# Patient Record
Sex: MALE | Race: WHITE | NOT HISPANIC OR LATINO | Employment: OTHER | URBAN - METROPOLITAN AREA
[De-identification: names, ages, dates, MRNs, and addresses within clinical notes are randomized per-mention and may not be internally consistent; named-entity substitution may affect disease eponyms.]

---

## 2017-02-02 ENCOUNTER — ALLSCRIPTS OFFICE VISIT (OUTPATIENT)
Dept: OTHER | Facility: OTHER | Age: 77
End: 2017-02-02

## 2018-01-11 NOTE — RESULT NOTES
Verified Results  (1) TISSUE EXAM [id number] 03:52PM Latricia Mendez     Test Name Result Flag Reference   LAB AP CASE REPORT (Report)     Surgical Pathology Report             Case: [id number]                   Authorizing Provider: Deepti Farah MD      Collected:      05/23/2016 1552        Ordering Location:   [facility]    Received:      05/23/2016 7 [facility]                              Pathologist:      Phoebe Frias MD                             Specimen:  Stomach, gastric bx r/o h pylori   LAB AP FINAL DIAGNOSIS (Report)     A  Gastric biopsy:    - Chronic inactive gastritis and mild reactive epithelial changes       - Negative for Helicobacter pylori organisms (immunohistochemical stain   with appropriate positive control)  - Negative for intestinal metaplasia, dysplasia and malignancy  LAB AP NOTE      Interpretation performed at [facility] [address]  [facility] [address],   [address] [facility]   LAB AP SURGICAL ADDITIONAL INFORMATION (Report)     These tests were developed and their performance characteristics   determined by [facility] or [address] [place] [address]  They may not be cleared or approved by the [place]  Food and   Drug Administration  The FDA has determined that such clearance or   approval is not necessary  These tests are used for clinical purposes  They should not be regarded as investigational or for research  This   laboratory has been approved by CLIA 88, designated as a high-complexity   laboratory and is qualified to perform these tests  LAB AP GROSS DESCRIPTION (Report)     A  The specimen is received in formalin, labeled with the patient's name   and hospital number, and is designated Gastric biopsy, rule out H    Pylori  The specimen consists of multiple tan to white to clear soft   tissue fragments measuring in aggregate 0 6 x 0 5 x 0 2 cm in greatest   dimension  Entirely submitted  One cassette      Note: The estimated total formalin fixation time based upon information   provided by the submitting clinician and the standard processing schedule   is 22 25 hours        ACL

## 2018-01-12 NOTE — MISCELLANEOUS
Message  Return to work or school: This patient was admitted to the [facility] 2/2-2/11/16  He required surgery 2/10/16  He was discharged 2/11/16  Daughter was present during hospitalization          Signatures   Electronically signed by : Mary Alfaro; Feb 17 2016  9:26AM EST                       (Author)

## 2018-01-14 VITALS
TEMPERATURE: 97.9 F | WEIGHT: 223 LBS | DIASTOLIC BLOOD PRESSURE: 64 MMHG | BODY MASS INDEX: 32 KG/M2 | OXYGEN SATURATION: 99 % | HEART RATE: 61 BPM | SYSTOLIC BLOOD PRESSURE: 138 MMHG

## 2019-01-01 ENCOUNTER — OFFICE VISIT (OUTPATIENT)
Dept: CARDIOLOGY CLINIC | Facility: CLINIC | Age: 79
End: 2019-01-01
Payer: COMMERCIAL

## 2019-01-01 ENCOUNTER — ANESTHESIA EVENT (INPATIENT)
Dept: PERIOP | Facility: HOSPITAL | Age: 79
DRG: 445 | End: 2019-01-01
Payer: COMMERCIAL

## 2019-01-01 ENCOUNTER — APPOINTMENT (EMERGENCY)
Dept: CT IMAGING | Facility: HOSPITAL | Age: 79
DRG: 445 | End: 2019-01-01
Payer: COMMERCIAL

## 2019-01-01 ENCOUNTER — TELEPHONE (OUTPATIENT)
Dept: GASTROENTEROLOGY | Facility: AMBULARY SURGERY CENTER | Age: 79
End: 2019-01-01

## 2019-01-01 ENCOUNTER — APPOINTMENT (INPATIENT)
Dept: PERIOP | Facility: HOSPITAL | Age: 79
DRG: 445 | End: 2019-01-01
Payer: COMMERCIAL

## 2019-01-01 ENCOUNTER — TRANSCRIBE ORDERS (OUTPATIENT)
Dept: GASTROENTEROLOGY | Facility: CLINIC | Age: 79
End: 2019-01-01

## 2019-01-01 ENCOUNTER — APPOINTMENT (INPATIENT)
Dept: RADIOLOGY | Facility: HOSPITAL | Age: 79
DRG: 445 | End: 2019-01-01
Payer: COMMERCIAL

## 2019-01-01 ENCOUNTER — HOSPITAL ENCOUNTER (INPATIENT)
Facility: HOSPITAL | Age: 79
LOS: 3 days | Discharge: HOME | DRG: 445 | End: 2019-12-08
Attending: EMERGENCY MEDICINE | Admitting: INTERNAL MEDICINE
Payer: COMMERCIAL

## 2019-01-01 ENCOUNTER — ANESTHESIA (INPATIENT)
Dept: PERIOP | Facility: HOSPITAL | Age: 79
DRG: 445 | End: 2019-01-01
Payer: COMMERCIAL

## 2019-01-01 ENCOUNTER — APPOINTMENT (INPATIENT)
Dept: NON INVASIVE DIAGNOSTICS | Facility: HOSPITAL | Age: 79
DRG: 445 | End: 2019-01-01
Payer: COMMERCIAL

## 2019-01-01 ENCOUNTER — TELEPHONE (OUTPATIENT)
Dept: CARDIOLOGY CLINIC | Facility: CLINIC | Age: 79
End: 2019-01-01

## 2019-01-01 VITALS
TEMPERATURE: 98.3 F | RESPIRATION RATE: 18 BRPM | DIASTOLIC BLOOD PRESSURE: 71 MMHG | HEART RATE: 90 BPM | OXYGEN SATURATION: 93 % | SYSTOLIC BLOOD PRESSURE: 141 MMHG | WEIGHT: 258 LBS | HEIGHT: 71 IN | BODY MASS INDEX: 36.12 KG/M2

## 2019-01-01 VITALS
OXYGEN SATURATION: 97 % | WEIGHT: 244.9 LBS | SYSTOLIC BLOOD PRESSURE: 138 MMHG | HEIGHT: 71 IN | HEART RATE: 72 BPM | BODY MASS INDEX: 34.28 KG/M2 | DIASTOLIC BLOOD PRESSURE: 62 MMHG

## 2019-01-01 DIAGNOSIS — K81.0 ACUTE CHOLECYSTITIS: ICD-10-CM

## 2019-01-01 DIAGNOSIS — E78.2 MIXED HYPERLIPIDEMIA: ICD-10-CM

## 2019-01-01 DIAGNOSIS — I31.3 PERICARDIAL EFFUSION: ICD-10-CM

## 2019-01-01 DIAGNOSIS — I10 ESSENTIAL HYPERTENSION: ICD-10-CM

## 2019-01-01 DIAGNOSIS — Z01.810 PREOPERATIVE CARDIOVASCULAR EXAMINATION: Primary | ICD-10-CM

## 2019-01-01 DIAGNOSIS — R74.01 TRANSAMINITIS: ICD-10-CM

## 2019-01-01 DIAGNOSIS — Z86.73 HISTORY OF CVA (CEREBROVASCULAR ACCIDENT): ICD-10-CM

## 2019-01-01 DIAGNOSIS — I48.0 PAF (PAROXYSMAL ATRIAL FIBRILLATION) (HCC): ICD-10-CM

## 2019-01-01 DIAGNOSIS — K80.42 CHOLEDOCHOLITHIASIS WITH ACUTE CHOLECYSTITIS: Primary | ICD-10-CM

## 2019-01-01 DIAGNOSIS — G45.1 TIA INVOLVING CAROTID ARTERY: ICD-10-CM

## 2019-01-01 LAB
ALBUMIN SERPL BCP-MCNC: 2.5 G/DL (ref 3.5–5)
ALBUMIN SERPL BCP-MCNC: 2.7 G/DL (ref 3.5–5)
ALBUMIN SERPL BCP-MCNC: 3.1 G/DL (ref 3.5–5)
ALBUMIN SERPL BCP-MCNC: 3.8 G/DL (ref 3.5–5)
ALP SERPL-CCNC: 204 U/L (ref 46–116)
ALP SERPL-CCNC: 208 U/L (ref 46–116)
ALP SERPL-CCNC: 223 U/L (ref 46–116)
ALP SERPL-CCNC: 252 U/L (ref 46–116)
ALT SERPL W P-5'-P-CCNC: 171 U/L (ref 12–78)
ALT SERPL W P-5'-P-CCNC: 218 U/L (ref 12–78)
ALT SERPL W P-5'-P-CCNC: 50 U/L (ref 12–78)
ALT SERPL W P-5'-P-CCNC: 92 U/L (ref 12–78)
ANION GAP SERPL CALCULATED.3IONS-SCNC: 10 MMOL/L (ref 4–13)
ANION GAP SERPL CALCULATED.3IONS-SCNC: 11 MMOL/L (ref 4–13)
ANION GAP SERPL CALCULATED.3IONS-SCNC: 8 MMOL/L (ref 4–13)
ANION GAP SERPL CALCULATED.3IONS-SCNC: 9 MMOL/L (ref 4–13)
APTT PPP: 31 SECONDS (ref 23–37)
AST SERPL W P-5'-P-CCNC: 108 U/L (ref 5–45)
AST SERPL W P-5'-P-CCNC: 54 U/L (ref 5–45)
AST SERPL W P-5'-P-CCNC: 66 U/L (ref 5–45)
AST SERPL W P-5'-P-CCNC: 69 U/L (ref 5–45)
ATRIAL RATE: 92 BPM
BACTERIA UR QL AUTO: ABNORMAL /HPF
BASOPHILS # BLD AUTO: 0.02 THOUSANDS/ΜL (ref 0–0.1)
BASOPHILS NFR BLD AUTO: 0 % (ref 0–1)
BILIRUB SERPL-MCNC: 2.21 MG/DL (ref 0.2–1)
BILIRUB SERPL-MCNC: 2.79 MG/DL (ref 0.2–1)
BILIRUB SERPL-MCNC: 3.7 MG/DL (ref 0.2–1)
BILIRUB SERPL-MCNC: 6.23 MG/DL (ref 0.2–1)
BILIRUB UR QL STRIP: NEGATIVE
BUN SERPL-MCNC: 18 MG/DL (ref 5–25)
BUN SERPL-MCNC: 21 MG/DL (ref 5–25)
BUN SERPL-MCNC: 24 MG/DL (ref 5–25)
BUN SERPL-MCNC: 28 MG/DL (ref 5–25)
CALCIUM SERPL-MCNC: 8 MG/DL (ref 8.3–10.1)
CALCIUM SERPL-MCNC: 8 MG/DL (ref 8.3–10.1)
CALCIUM SERPL-MCNC: 8.4 MG/DL (ref 8.3–10.1)
CALCIUM SERPL-MCNC: 9.2 MG/DL (ref 8.3–10.1)
CHLORIDE SERPL-SCNC: 102 MMOL/L (ref 100–108)
CHLORIDE SERPL-SCNC: 104 MMOL/L (ref 100–108)
CHLORIDE SERPL-SCNC: 104 MMOL/L (ref 100–108)
CHLORIDE SERPL-SCNC: 97 MMOL/L (ref 100–108)
CLARITY UR: CLEAR
CO2 SERPL-SCNC: 25 MMOL/L (ref 21–32)
CO2 SERPL-SCNC: 25 MMOL/L (ref 21–32)
CO2 SERPL-SCNC: 26 MMOL/L (ref 21–32)
CO2 SERPL-SCNC: 26 MMOL/L (ref 21–32)
COLOR UR: YELLOW
CREAT SERPL-MCNC: 0.9 MG/DL (ref 0.6–1.3)
CREAT SERPL-MCNC: 0.92 MG/DL (ref 0.6–1.3)
CREAT SERPL-MCNC: 1.02 MG/DL (ref 0.6–1.3)
CREAT SERPL-MCNC: 1.13 MG/DL (ref 0.6–1.3)
EOSINOPHIL # BLD AUTO: 0 THOUSAND/ΜL (ref 0–0.61)
EOSINOPHIL NFR BLD AUTO: 0 % (ref 0–6)
ERYTHROCYTE [DISTWIDTH] IN BLOOD BY AUTOMATED COUNT: 13.2 % (ref 11.6–15.1)
ERYTHROCYTE [DISTWIDTH] IN BLOOD BY AUTOMATED COUNT: 13.3 % (ref 11.6–15.1)
ERYTHROCYTE [DISTWIDTH] IN BLOOD BY AUTOMATED COUNT: 13.3 % (ref 11.6–15.1)
GFR SERPL CREATININE-BSD FRML MDRD: 61 ML/MIN/1.73SQ M
GFR SERPL CREATININE-BSD FRML MDRD: 69 ML/MIN/1.73SQ M
GFR SERPL CREATININE-BSD FRML MDRD: 78 ML/MIN/1.73SQ M
GFR SERPL CREATININE-BSD FRML MDRD: 80 ML/MIN/1.73SQ M
GLUCOSE SERPL-MCNC: 117 MG/DL (ref 65–140)
GLUCOSE SERPL-MCNC: 176 MG/DL (ref 65–140)
GLUCOSE SERPL-MCNC: 80 MG/DL (ref 65–140)
GLUCOSE SERPL-MCNC: 89 MG/DL (ref 65–140)
GLUCOSE UR STRIP-MCNC: NEGATIVE MG/DL
HCT VFR BLD AUTO: 37.9 % (ref 36.5–49.3)
HCT VFR BLD AUTO: 40.2 % (ref 36.5–49.3)
HCT VFR BLD AUTO: 43 % (ref 36.5–49.3)
HGB BLD-MCNC: 12.5 G/DL (ref 12–17)
HGB BLD-MCNC: 13.2 G/DL (ref 12–17)
HGB BLD-MCNC: 14.3 G/DL (ref 12–17)
HGB UR QL STRIP.AUTO: ABNORMAL
IMM GRANULOCYTES # BLD AUTO: 0.04 THOUSAND/UL (ref 0–0.2)
IMM GRANULOCYTES NFR BLD AUTO: 0 % (ref 0–2)
INR PPP: 1.06 (ref 0.84–1.19)
KETONES UR STRIP-MCNC: ABNORMAL MG/DL
LACTATE SERPL-SCNC: 1.8 MMOL/L (ref 0.5–2)
LEUKOCYTE ESTERASE UR QL STRIP: NEGATIVE
LIPASE SERPL-CCNC: 236 U/L (ref 73–393)
LYMPHOCYTES # BLD AUTO: 0.46 THOUSANDS/ΜL (ref 0.6–4.47)
LYMPHOCYTES NFR BLD AUTO: 4 % (ref 14–44)
MAGNESIUM SERPL-MCNC: 1.6 MG/DL (ref 1.6–2.6)
MCH RBC QN AUTO: 30.2 PG (ref 26.8–34.3)
MCH RBC QN AUTO: 30.4 PG (ref 26.8–34.3)
MCH RBC QN AUTO: 30.7 PG (ref 26.8–34.3)
MCHC RBC AUTO-ENTMCNC: 32.8 G/DL (ref 31.4–37.4)
MCHC RBC AUTO-ENTMCNC: 33 G/DL (ref 31.4–37.4)
MCHC RBC AUTO-ENTMCNC: 33.3 G/DL (ref 31.4–37.4)
MCV RBC AUTO: 91 FL (ref 82–98)
MCV RBC AUTO: 93 FL (ref 82–98)
MCV RBC AUTO: 93 FL (ref 82–98)
MONOCYTES # BLD AUTO: 0.59 THOUSAND/ΜL (ref 0.17–1.22)
MONOCYTES NFR BLD AUTO: 5 % (ref 4–12)
NEUTROPHILS # BLD AUTO: 10.07 THOUSANDS/ΜL (ref 1.85–7.62)
NEUTS SEG NFR BLD AUTO: 91 % (ref 43–75)
NITRITE UR QL STRIP: NEGATIVE
NON-SQ EPI CELLS URNS QL MICRO: ABNORMAL /HPF
NRBC BLD AUTO-RTO: 0 /100 WBCS
P AXIS: 84 DEGREES
PH UR STRIP.AUTO: 6 [PH] (ref 4.5–8)
PLATELET # BLD AUTO: 113 THOUSANDS/UL (ref 149–390)
PLATELET # BLD AUTO: 95 THOUSANDS/UL (ref 149–390)
PLATELET # BLD AUTO: 98 THOUSANDS/UL (ref 149–390)
PMV BLD AUTO: 10.2 FL (ref 8.9–12.7)
PMV BLD AUTO: 10.3 FL (ref 8.9–12.7)
PMV BLD AUTO: 10.4 FL (ref 8.9–12.7)
POTASSIUM SERPL-SCNC: 3.2 MMOL/L (ref 3.5–5.3)
POTASSIUM SERPL-SCNC: 3.6 MMOL/L (ref 3.5–5.3)
POTASSIUM SERPL-SCNC: 3.9 MMOL/L (ref 3.5–5.3)
POTASSIUM SERPL-SCNC: 4.2 MMOL/L (ref 3.5–5.3)
PR INTERVAL: 220 MS
PROT SERPL-MCNC: 6 G/DL (ref 6.4–8.2)
PROT SERPL-MCNC: 6 G/DL (ref 6.4–8.2)
PROT SERPL-MCNC: 6.6 G/DL (ref 6.4–8.2)
PROT SERPL-MCNC: 7.7 G/DL (ref 6.4–8.2)
PROT UR STRIP-MCNC: ABNORMAL MG/DL
PROTHROMBIN TIME: 13.2 SECONDS (ref 11.6–14.5)
QRS AXIS: 41 DEGREES
QRSD INTERVAL: 114 MS
QT INTERVAL: 412 MS
QTC INTERVAL: 504 MS
RBC # BLD AUTO: 4.07 MILLION/UL (ref 3.88–5.62)
RBC # BLD AUTO: 4.34 MILLION/UL (ref 3.88–5.62)
RBC # BLD AUTO: 4.73 MILLION/UL (ref 3.88–5.62)
RBC #/AREA URNS AUTO: ABNORMAL /HPF
SODIUM SERPL-SCNC: 133 MMOL/L (ref 136–145)
SODIUM SERPL-SCNC: 136 MMOL/L (ref 136–145)
SODIUM SERPL-SCNC: 138 MMOL/L (ref 136–145)
SODIUM SERPL-SCNC: 140 MMOL/L (ref 136–145)
SP GR UR STRIP.AUTO: 1.01 (ref 1–1.03)
T WAVE AXIS: 29 DEGREES
TROPONIN I SERPL-MCNC: 0.05 NG/ML
UROBILINOGEN UR QL STRIP.AUTO: 1 E.U./DL
VENTRICULAR RATE: 90 BPM
WBC # BLD AUTO: 11.05 THOUSAND/UL (ref 4.31–10.16)
WBC # BLD AUTO: 11.18 THOUSAND/UL (ref 4.31–10.16)
WBC # BLD AUTO: 8.87 THOUSAND/UL (ref 4.31–10.16)
WBC #/AREA URNS AUTO: ABNORMAL /HPF

## 2019-01-01 PROCEDURE — 99232 SBSQ HOSP IP/OBS MODERATE 35: CPT | Performed by: INTERNAL MEDICINE

## 2019-01-01 PROCEDURE — 80053 COMPREHEN METABOLIC PANEL: CPT | Performed by: PHYSICIAN ASSISTANT

## 2019-01-01 PROCEDURE — 93005 ELECTROCARDIOGRAM TRACING: CPT

## 2019-01-01 PROCEDURE — 96375 TX/PRO/DX INJ NEW DRUG ADDON: CPT

## 2019-01-01 PROCEDURE — C1769 GUIDE WIRE: HCPCS

## 2019-01-01 PROCEDURE — C2617 STENT, NON-COR, TEM W/O DEL: HCPCS

## 2019-01-01 PROCEDURE — 0F798DZ DILATION OF COMMON BILE DUCT WITH INTRALUMINAL DEVICE, VIA NATURAL OR ARTIFICIAL OPENING ENDOSCOPIC: ICD-10-PCS | Performed by: INTERNAL MEDICINE

## 2019-01-01 PROCEDURE — 85027 COMPLETE CBC AUTOMATED: CPT | Performed by: PHYSICIAN ASSISTANT

## 2019-01-01 PROCEDURE — 80053 COMPREHEN METABOLIC PANEL: CPT | Performed by: SURGERY

## 2019-01-01 PROCEDURE — 99239 HOSP IP/OBS DSCHRG MGMT >30: CPT | Performed by: PHYSICIAN ASSISTANT

## 2019-01-01 PROCEDURE — 93325 DOPPLER ECHO COLOR FLOW MAPG: CPT | Performed by: INTERNAL MEDICINE

## 2019-01-01 PROCEDURE — 36415 COLL VENOUS BLD VENIPUNCTURE: CPT | Performed by: PHYSICIAN ASSISTANT

## 2019-01-01 PROCEDURE — 85025 COMPLETE CBC W/AUTO DIFF WBC: CPT | Performed by: PHYSICIAN ASSISTANT

## 2019-01-01 PROCEDURE — 81001 URINALYSIS AUTO W/SCOPE: CPT

## 2019-01-01 PROCEDURE — 93010 ELECTROCARDIOGRAM REPORT: CPT | Performed by: INTERNAL MEDICINE

## 2019-01-01 PROCEDURE — 99223 1ST HOSP IP/OBS HIGH 75: CPT | Performed by: INTERNAL MEDICINE

## 2019-01-01 PROCEDURE — 83690 ASSAY OF LIPASE: CPT | Performed by: PHYSICIAN ASSISTANT

## 2019-01-01 PROCEDURE — 99285 EMERGENCY DEPT VISIT HI MDM: CPT | Performed by: PHYSICIAN ASSISTANT

## 2019-01-01 PROCEDURE — 96361 HYDRATE IV INFUSION ADD-ON: CPT

## 2019-01-01 PROCEDURE — 99231 SBSQ HOSP IP/OBS SF/LOW 25: CPT | Performed by: SURGERY

## 2019-01-01 PROCEDURE — 85730 THROMBOPLASTIN TIME PARTIAL: CPT | Performed by: PHYSICIAN ASSISTANT

## 2019-01-01 PROCEDURE — 99232 SBSQ HOSP IP/OBS MODERATE 35: CPT | Performed by: PHYSICIAN ASSISTANT

## 2019-01-01 PROCEDURE — 93321 DOPPLER ECHO F-UP/LMTD STD: CPT | Performed by: INTERNAL MEDICINE

## 2019-01-01 PROCEDURE — 99214 OFFICE O/P EST MOD 30 MIN: CPT | Performed by: NURSE PRACTITIONER

## 2019-01-01 PROCEDURE — BF101ZZ FLUOROSCOPY OF BILE DUCTS USING LOW OSMOLAR CONTRAST: ICD-10-PCS | Performed by: RADIOLOGY

## 2019-01-01 PROCEDURE — 99285 EMERGENCY DEPT VISIT HI MDM: CPT

## 2019-01-01 PROCEDURE — 74177 CT ABD & PELVIS W/CONTRAST: CPT

## 2019-01-01 PROCEDURE — 3075F SYST BP GE 130 - 139MM HG: CPT | Performed by: NURSE PRACTITIONER

## 2019-01-01 PROCEDURE — 83735 ASSAY OF MAGNESIUM: CPT | Performed by: PHYSICIAN ASSISTANT

## 2019-01-01 PROCEDURE — 96365 THER/PROPH/DIAG IV INF INIT: CPT

## 2019-01-01 PROCEDURE — 83605 ASSAY OF LACTIC ACID: CPT | Performed by: PHYSICIAN ASSISTANT

## 2019-01-01 PROCEDURE — 93000 ELECTROCARDIOGRAM COMPLETE: CPT | Performed by: NURSE PRACTITIONER

## 2019-01-01 PROCEDURE — 93308 TTE F-UP OR LMTD: CPT

## 2019-01-01 PROCEDURE — 43273 ENDOSCOPIC PANCREATOSCOPY: CPT | Performed by: INTERNAL MEDICINE

## 2019-01-01 PROCEDURE — 93308 TTE F-UP OR LMTD: CPT | Performed by: INTERNAL MEDICINE

## 2019-01-01 PROCEDURE — 99221 1ST HOSP IP/OBS SF/LOW 40: CPT | Performed by: SURGERY

## 2019-01-01 PROCEDURE — 85610 PROTHROMBIN TIME: CPT | Performed by: PHYSICIAN ASSISTANT

## 2019-01-01 PROCEDURE — 74328 X-RAY BILE DUCT ENDOSCOPY: CPT

## 2019-01-01 PROCEDURE — 43274 ERCP DUCT STENT PLACEMENT: CPT | Performed by: INTERNAL MEDICINE

## 2019-01-01 PROCEDURE — 3078F DIAST BP <80 MM HG: CPT | Performed by: NURSE PRACTITIONER

## 2019-01-01 PROCEDURE — 84484 ASSAY OF TROPONIN QUANT: CPT | Performed by: PHYSICIAN ASSISTANT

## 2019-01-01 RX ORDER — CEPHALEXIN 500 MG/1
500 CAPSULE ORAL EVERY 6 HOURS SCHEDULED
Qty: 16 CAPSULE | Refills: 0 | Status: SHIPPED | OUTPATIENT
Start: 2019-01-01 | End: 2019-01-01

## 2019-01-01 RX ORDER — PROPOFOL 10 MG/ML
INJECTION, EMULSION INTRAVENOUS AS NEEDED
Status: DISCONTINUED | OUTPATIENT
Start: 2019-01-01 | End: 2019-01-01 | Stop reason: SURG

## 2019-01-01 RX ORDER — ATORVASTATIN CALCIUM 40 MG/1
40 TABLET, FILM COATED ORAL
Status: DISCONTINUED | OUTPATIENT
Start: 2019-01-01 | End: 2019-01-01 | Stop reason: HOSPADM

## 2019-01-01 RX ORDER — CEFAZOLIN SODIUM 2 G/50ML
2000 SOLUTION INTRAVENOUS ONCE
Status: COMPLETED | OUTPATIENT
Start: 2019-01-01 | End: 2019-01-01

## 2019-01-01 RX ORDER — THIAMINE MONONITRATE (VIT B1) 100 MG
100 TABLET ORAL 2 TIMES DAILY
Status: DISCONTINUED | OUTPATIENT
Start: 2019-01-01 | End: 2019-01-01 | Stop reason: HOSPADM

## 2019-01-01 RX ORDER — TRAMADOL HYDROCHLORIDE 50 MG/1
50 TABLET ORAL EVERY 6 HOURS PRN
Qty: 20 TABLET | Refills: 0 | Status: SHIPPED | OUTPATIENT
Start: 2019-01-01 | End: 2019-01-01

## 2019-01-01 RX ORDER — LIDOCAINE HYDROCHLORIDE 10 MG/ML
INJECTION, SOLUTION INFILTRATION; PERINEURAL
Status: COMPLETED | OUTPATIENT
Start: 2019-01-01 | End: 2019-01-01

## 2019-01-01 RX ORDER — MORPHINE SULFATE 4 MG/ML
4 INJECTION, SOLUTION INTRAMUSCULAR; INTRAVENOUS ONCE
Status: COMPLETED | OUTPATIENT
Start: 2019-01-01 | End: 2019-01-01

## 2019-01-01 RX ORDER — POLYETHYLENE GLYCOL 3350 17 G/17G
17 POWDER, FOR SOLUTION ORAL DAILY
Status: DISCONTINUED | OUTPATIENT
Start: 2019-01-01 | End: 2019-01-01 | Stop reason: HOSPADM

## 2019-01-01 RX ORDER — FERROUS SULFATE 325(65) MG
325 TABLET ORAL
Status: DISCONTINUED | OUTPATIENT
Start: 2019-01-01 | End: 2019-01-01 | Stop reason: HOSPADM

## 2019-01-01 RX ORDER — SODIUM CHLORIDE 9 MG/ML
100 INJECTION, SOLUTION INTRAVENOUS CONTINUOUS
Status: DISCONTINUED | OUTPATIENT
Start: 2019-01-01 | End: 2019-01-01

## 2019-01-01 RX ORDER — ASPIRIN 81 MG/1
81 TABLET, CHEWABLE ORAL DAILY
Status: DISCONTINUED | OUTPATIENT
Start: 2019-01-01 | End: 2019-01-01 | Stop reason: HOSPADM

## 2019-01-01 RX ORDER — METRONIDAZOLE 500 MG/1
500 TABLET ORAL EVERY 8 HOURS SCHEDULED
Qty: 12 TABLET | Refills: 0 | Status: SHIPPED | OUTPATIENT
Start: 2019-01-01 | End: 2019-01-01

## 2019-01-01 RX ORDER — ONDANSETRON 2 MG/ML
4 INJECTION INTRAMUSCULAR; INTRAVENOUS EVERY 4 HOURS PRN
Status: DISCONTINUED | OUTPATIENT
Start: 2019-01-01 | End: 2019-01-01 | Stop reason: HOSPADM

## 2019-01-01 RX ORDER — SODIUM CHLORIDE 9 MG/ML
75 INJECTION, SOLUTION INTRAVENOUS CONTINUOUS
Status: DISCONTINUED | OUTPATIENT
Start: 2019-01-01 | End: 2019-01-01 | Stop reason: HOSPADM

## 2019-01-01 RX ORDER — DOCUSATE SODIUM 100 MG/1
100 CAPSULE, LIQUID FILLED ORAL 2 TIMES DAILY
Status: DISCONTINUED | OUTPATIENT
Start: 2019-01-01 | End: 2019-01-01 | Stop reason: HOSPADM

## 2019-01-01 RX ORDER — ONDANSETRON 2 MG/ML
4 INJECTION INTRAMUSCULAR; INTRAVENOUS ONCE
Status: COMPLETED | OUTPATIENT
Start: 2019-01-01 | End: 2019-01-01

## 2019-01-01 RX ORDER — CHLORAL HYDRATE 500 MG
1000 CAPSULE ORAL DAILY
Status: DISCONTINUED | OUTPATIENT
Start: 2019-01-01 | End: 2019-01-01 | Stop reason: HOSPADM

## 2019-01-01 RX ORDER — LIDOCAINE HYDROCHLORIDE 10 MG/ML
INJECTION, SOLUTION INFILTRATION; PERINEURAL AS NEEDED
Status: DISCONTINUED | OUTPATIENT
Start: 2019-01-01 | End: 2019-01-01 | Stop reason: SURG

## 2019-01-01 RX ORDER — LANOLIN ALCOHOL/MO/W.PET/CERES
400 CREAM (GRAM) TOPICAL DAILY
Status: DISCONTINUED | OUTPATIENT
Start: 2019-01-01 | End: 2019-01-01 | Stop reason: HOSPADM

## 2019-01-01 RX ORDER — FENTANYL CITRATE 50 UG/ML
INJECTION, SOLUTION INTRAMUSCULAR; INTRAVENOUS AS NEEDED
Status: DISCONTINUED | OUTPATIENT
Start: 2019-01-01 | End: 2019-01-01 | Stop reason: SURG

## 2019-01-01 RX ORDER — ONDANSETRON 2 MG/ML
INJECTION INTRAMUSCULAR; INTRAVENOUS AS NEEDED
Status: DISCONTINUED | OUTPATIENT
Start: 2019-01-01 | End: 2019-01-01 | Stop reason: SURG

## 2019-01-01 RX ORDER — HYDRALAZINE HYDROCHLORIDE 20 MG/ML
5 INJECTION INTRAMUSCULAR; INTRAVENOUS EVERY 6 HOURS PRN
Status: DISCONTINUED | OUTPATIENT
Start: 2019-01-01 | End: 2019-01-01 | Stop reason: HOSPADM

## 2019-01-01 RX ORDER — LISINOPRIL 5 MG/1
5 TABLET ORAL DAILY
Status: DISCONTINUED | OUTPATIENT
Start: 2019-01-01 | End: 2019-01-01 | Stop reason: HOSPADM

## 2019-01-01 RX ORDER — CEFAZOLIN SODIUM 2 G/50ML
2000 SOLUTION INTRAVENOUS EVERY 8 HOURS
Status: DISCONTINUED | OUTPATIENT
Start: 2019-01-01 | End: 2019-01-01 | Stop reason: HOSPADM

## 2019-01-01 RX ORDER — TRAMADOL HYDROCHLORIDE 50 MG/1
50 TABLET ORAL EVERY 6 HOURS PRN
Qty: 20 TABLET | Refills: 0 | Status: SHIPPED | OUTPATIENT
Start: 2019-01-01 | End: 2019-01-01 | Stop reason: SDUPTHER

## 2019-01-01 RX ORDER — CHOLECALCIFEROL (VITAMIN D3) 125 MCG
100 CAPSULE ORAL DAILY
Status: DISCONTINUED | OUTPATIENT
Start: 2019-01-01 | End: 2019-01-01 | Stop reason: HOSPADM

## 2019-01-01 RX ORDER — SUCCINYLCHOLINE/SOD CL,ISO/PF 100 MG/5ML
SYRINGE (ML) INTRAVENOUS AS NEEDED
Status: DISCONTINUED | OUTPATIENT
Start: 2019-01-01 | End: 2019-01-01 | Stop reason: SURG

## 2019-01-01 RX ORDER — ONDANSETRON 2 MG/ML
4 INJECTION INTRAMUSCULAR; INTRAVENOUS EVERY 6 HOURS PRN
Status: DISCONTINUED | OUTPATIENT
Start: 2019-01-01 | End: 2019-01-01 | Stop reason: HOSPADM

## 2019-01-01 RX ORDER — CALCIUM CARBONATE 200(500)MG
1000 TABLET,CHEWABLE ORAL DAILY PRN
Status: DISCONTINUED | OUTPATIENT
Start: 2019-01-01 | End: 2019-01-01 | Stop reason: HOSPADM

## 2019-01-01 RX ORDER — POTASSIUM CHLORIDE 20 MEQ/1
40 TABLET, EXTENDED RELEASE ORAL ONCE
Status: COMPLETED | OUTPATIENT
Start: 2019-01-01 | End: 2019-01-01

## 2019-01-01 RX ORDER — RIBOFLAVIN (VITAMIN B2) 100 MG
100 TABLET ORAL DAILY
Status: DISCONTINUED | OUTPATIENT
Start: 2019-01-01 | End: 2019-01-01 | Stop reason: RX

## 2019-01-01 RX ORDER — SENNOSIDES 8.6 MG
1 TABLET ORAL DAILY
Status: DISCONTINUED | OUTPATIENT
Start: 2019-01-01 | End: 2019-01-01 | Stop reason: HOSPADM

## 2019-01-01 RX ADMIN — Medication 1000 MG: at 08:38

## 2019-01-01 RX ADMIN — VITAM B12 50 MCG: 100 TAB at 08:38

## 2019-01-01 RX ADMIN — DOCUSATE SODIUM 100 MG: 100 CAPSULE, LIQUID FILLED ORAL at 08:38

## 2019-01-01 RX ADMIN — METRONIDAZOLE 500 MG: 500 INJECTION, SOLUTION INTRAVENOUS at 23:49

## 2019-01-01 RX ADMIN — Medication 100 MG: at 08:11

## 2019-01-01 RX ADMIN — LIDOCAINE HYDROCHLORIDE 0.5 ML: 10 INJECTION, SOLUTION INFILTRATION; PERINEURAL at 11:10

## 2019-01-01 RX ADMIN — MAGNESIUM GLUCONATE 500 MG ORAL TABLET 400 MG: 500 TABLET ORAL at 17:04

## 2019-01-01 RX ADMIN — CEFAZOLIN SODIUM 2000 MG: 2 SOLUTION INTRAVENOUS at 06:19

## 2019-01-01 RX ADMIN — ENOXAPARIN SODIUM 40 MG: 40 INJECTION SUBCUTANEOUS at 08:41

## 2019-01-01 RX ADMIN — IOHEXOL 100 ML: 350 INJECTION, SOLUTION INTRAVENOUS at 13:34

## 2019-01-01 RX ADMIN — SODIUM CHLORIDE 125 ML/HR: 0.9 INJECTION, SOLUTION INTRAVENOUS at 18:56

## 2019-01-01 RX ADMIN — LIDOCAINE HYDROCHLORIDE 50 MG: 10 INJECTION, SOLUTION INFILTRATION; PERINEURAL at 11:35

## 2019-01-01 RX ADMIN — FENTANYL CITRATE 50 MCG: 50 INJECTION INTRAMUSCULAR; INTRAVENOUS at 11:35

## 2019-01-01 RX ADMIN — Medication 400 MCG: at 08:40

## 2019-01-01 RX ADMIN — Medication 1 TABLET: at 08:38

## 2019-01-01 RX ADMIN — FERROUS SULFATE TAB 325 MG (65 MG ELEMENTAL FE) 325 MG: 325 (65 FE) TAB at 08:10

## 2019-01-01 RX ADMIN — ASPIRIN 81 MG 81 MG: 81 TABLET ORAL at 08:38

## 2019-01-01 RX ADMIN — IOHEXOL 16 ML: 240 INJECTION, SOLUTION INTRATHECAL; INTRAVASCULAR; INTRAVENOUS; ORAL at 12:07

## 2019-01-01 RX ADMIN — METRONIDAZOLE 500 MG: 500 INJECTION, SOLUTION INTRAVENOUS at 01:14

## 2019-01-01 RX ADMIN — METRONIDAZOLE 500 MG: 500 INJECTION, SOLUTION INTRAVENOUS at 23:12

## 2019-01-01 RX ADMIN — ASPIRIN 81 MG 81 MG: 81 TABLET ORAL at 14:35

## 2019-01-01 RX ADMIN — MORPHINE SULFATE 2 MG: 2 INJECTION, SOLUTION INTRAMUSCULAR; INTRAVENOUS at 06:43

## 2019-01-01 RX ADMIN — METRONIDAZOLE 500 MG: 500 INJECTION, SOLUTION INTRAVENOUS at 09:18

## 2019-01-01 RX ADMIN — SODIUM CHLORIDE 1000 ML: 0.9 INJECTION, SOLUTION INTRAVENOUS at 12:46

## 2019-01-01 RX ADMIN — Medication 1000 MG: at 14:34

## 2019-01-01 RX ADMIN — MORPHINE SULFATE 1 MG: 2 INJECTION, SOLUTION INTRAMUSCULAR; INTRAVENOUS at 20:10

## 2019-01-01 RX ADMIN — METRONIDAZOLE 500 MG: 500 INJECTION, SOLUTION INTRAVENOUS at 16:30

## 2019-01-01 RX ADMIN — FERROUS SULFATE TAB 325 MG (65 MG ELEMENTAL FE) 325 MG: 325 (65 FE) TAB at 14:34

## 2019-01-01 RX ADMIN — SODIUM CHLORIDE 100 ML/HR: 0.9 INJECTION, SOLUTION INTRAVENOUS at 17:02

## 2019-01-01 RX ADMIN — MAGNESIUM GLUCONATE 500 MG ORAL TABLET 400 MG: 500 TABLET ORAL at 08:38

## 2019-01-01 RX ADMIN — CEFAZOLIN SODIUM 2000 MG: 2 SOLUTION INTRAVENOUS at 06:42

## 2019-01-01 RX ADMIN — LISINOPRIL 5 MG: 5 TABLET ORAL at 08:11

## 2019-01-01 RX ADMIN — POTASSIUM CHLORIDE 40 MEQ: 1500 TABLET, EXTENDED RELEASE ORAL at 11:24

## 2019-01-01 RX ADMIN — FERROUS SULFATE TAB 325 MG (65 MG ELEMENTAL FE) 325 MG: 325 (65 FE) TAB at 08:38

## 2019-01-01 RX ADMIN — MAGNESIUM GLUCONATE 500 MG ORAL TABLET 400 MG: 500 TABLET ORAL at 17:11

## 2019-01-01 RX ADMIN — CEFAZOLIN SODIUM 2000 MG: 2 SOLUTION INTRAVENOUS at 22:21

## 2019-01-01 RX ADMIN — Medication 100 MG: at 14:34

## 2019-01-01 RX ADMIN — ONDANSETRON 4 MG: 2 INJECTION INTRAMUSCULAR; INTRAVENOUS at 11:42

## 2019-01-01 RX ADMIN — ATORVASTATIN CALCIUM 40 MG: 40 TABLET, FILM COATED ORAL at 15:39

## 2019-01-01 RX ADMIN — METRONIDAZOLE 500 MG: 500 INJECTION, SOLUTION INTRAVENOUS at 08:12

## 2019-01-01 RX ADMIN — MAGNESIUM GLUCONATE 500 MG ORAL TABLET 400 MG: 500 TABLET ORAL at 08:10

## 2019-01-01 RX ADMIN — CEFAZOLIN SODIUM 2000 MG: 2 SOLUTION INTRAVENOUS at 00:43

## 2019-01-01 RX ADMIN — FENTANYL CITRATE 50 MCG: 50 INJECTION INTRAMUSCULAR; INTRAVENOUS at 11:51

## 2019-01-01 RX ADMIN — Medication 1 TABLET: at 14:34

## 2019-01-01 RX ADMIN — ENOXAPARIN SODIUM 40 MG: 40 INJECTION SUBCUTANEOUS at 08:12

## 2019-01-01 RX ADMIN — CEFAZOLIN SODIUM 2000 MG: 2 SOLUTION INTRAVENOUS at 14:29

## 2019-01-01 RX ADMIN — MAGNESIUM GLUCONATE 500 MG ORAL TABLET 400 MG: 500 TABLET ORAL at 14:33

## 2019-01-01 RX ADMIN — SENNOSIDES 8.6 MG: 8.6 TABLET, FILM COATED ORAL at 14:34

## 2019-01-01 RX ADMIN — SODIUM CHLORIDE 75 ML/HR: 0.9 INJECTION, SOLUTION INTRAVENOUS at 16:25

## 2019-01-01 RX ADMIN — CEFAZOLIN SODIUM 2000 MG: 2 SOLUTION INTRAVENOUS at 16:26

## 2019-01-01 RX ADMIN — PROPOFOL 150 MG: 10 INJECTION, EMULSION INTRAVENOUS at 11:35

## 2019-01-01 RX ADMIN — METRONIDAZOLE 500 MG: 500 INJECTION, SOLUTION INTRAVENOUS at 17:04

## 2019-01-01 RX ADMIN — CEFAZOLIN SODIUM 2000 MG: 2 SOLUTION INTRAVENOUS at 14:47

## 2019-01-01 RX ADMIN — METRONIDAZOLE 500 MG: 500 INJECTION, SOLUTION INTRAVENOUS at 08:39

## 2019-01-01 RX ADMIN — MORPHINE SULFATE 2 MG: 2 INJECTION, SOLUTION INTRAMUSCULAR; INTRAVENOUS at 17:50

## 2019-01-01 RX ADMIN — Medication 100 MG: at 08:38

## 2019-01-01 RX ADMIN — Medication 1000 MG: at 08:10

## 2019-01-01 RX ADMIN — SODIUM CHLORIDE 75 ML/HR: 0.9 INJECTION, SOLUTION INTRAVENOUS at 06:51

## 2019-01-01 RX ADMIN — METRONIDAZOLE 500 MG: 500 INJECTION, SOLUTION INTRAVENOUS at 15:39

## 2019-01-01 RX ADMIN — SODIUM CHLORIDE 125 ML/HR: 0.9 INJECTION, SOLUTION INTRAVENOUS at 01:20

## 2019-01-01 RX ADMIN — MORPHINE SULFATE 2 MG: 2 INJECTION, SOLUTION INTRAMUSCULAR; INTRAVENOUS at 08:24

## 2019-01-01 RX ADMIN — CEFAZOLIN SODIUM 2000 MG: 2 SOLUTION INTRAVENOUS at 22:14

## 2019-01-01 RX ADMIN — ONDANSETRON 4 MG: 2 INJECTION INTRAMUSCULAR; INTRAVENOUS at 12:46

## 2019-01-01 RX ADMIN — SENNOSIDES 8.6 MG: 8.6 TABLET, FILM COATED ORAL at 08:41

## 2019-01-01 RX ADMIN — SODIUM CHLORIDE 125 ML/HR: 0.9 INJECTION, SOLUTION INTRAVENOUS at 09:13

## 2019-01-01 RX ADMIN — VITAM B12 50 MCG: 100 TAB at 14:33

## 2019-01-01 RX ADMIN — MORPHINE SULFATE 2 MG: 2 INJECTION, SOLUTION INTRAMUSCULAR; INTRAVENOUS at 01:15

## 2019-01-01 RX ADMIN — ASPIRIN 81 MG 81 MG: 81 TABLET ORAL at 08:10

## 2019-01-01 RX ADMIN — MORPHINE SULFATE 4 MG: 4 INJECTION INTRAVENOUS at 12:46

## 2019-01-01 RX ADMIN — Medication 1 TABLET: at 08:10

## 2019-01-01 RX ADMIN — SODIUM CHLORIDE 75 ML/HR: 0.9 INJECTION, SOLUTION INTRAVENOUS at 01:14

## 2019-01-01 RX ADMIN — Medication 100 MG: at 08:39

## 2019-01-01 RX ADMIN — VITAM B12 50 MCG: 100 TAB at 08:11

## 2019-01-01 RX ADMIN — Medication 100 MG: at 14:35

## 2019-01-01 RX ADMIN — Medication 100 MG: at 08:10

## 2019-01-01 RX ADMIN — POLYETHYLENE GLYCOL 3350 17 G: 17 POWDER, FOR SOLUTION ORAL at 11:23

## 2019-01-01 RX ADMIN — CEFAZOLIN SODIUM 2000 MG: 2 SOLUTION INTRAVENOUS at 06:13

## 2019-01-01 RX ADMIN — ATORVASTATIN CALCIUM 40 MG: 40 TABLET, FILM COATED ORAL at 17:03

## 2019-01-01 RX ADMIN — INDOMETHACIN 100 MG: 50 SUPPOSITORY RECTAL at 11:49

## 2019-01-01 RX ADMIN — Medication 100 MG: at 17:11

## 2019-01-01 RX ADMIN — Medication 400 MCG: at 08:12

## 2019-01-01 RX ADMIN — LISINOPRIL 5 MG: 5 TABLET ORAL at 08:38

## 2019-01-01 RX ADMIN — DOCUSATE SODIUM 100 MG: 100 CAPSULE, LIQUID FILLED ORAL at 17:56

## 2019-01-01 RX ADMIN — SENNOSIDES 8.6 MG: 8.6 TABLET, FILM COATED ORAL at 08:11

## 2019-01-01 RX ADMIN — Medication 100 MG: at 17:04

## 2019-01-01 RX ADMIN — Medication 100 MG: at 11:35

## 2019-06-25 ENCOUNTER — HOSPITAL ENCOUNTER (OUTPATIENT)
Dept: RADIOLOGY | Facility: IMAGING CENTER | Age: 79
Discharge: HOME | End: 2019-06-25
Payer: COMMERCIAL

## 2019-06-25 ENCOUNTER — TRANSCRIBE ORDERS (OUTPATIENT)
Dept: ADMINISTRATIVE | Facility: HOSPITAL | Age: 79
End: 2019-06-25

## 2019-06-25 DIAGNOSIS — E78.49 OTHER HYPERLIPIDEMIA: ICD-10-CM

## 2019-06-25 DIAGNOSIS — S22.32XS: Primary | ICD-10-CM

## 2019-06-25 DIAGNOSIS — D64.9 ANEMIA, UNSPECIFIED TYPE: ICD-10-CM

## 2019-06-25 DIAGNOSIS — I10 ESSENTIAL HYPERTENSION, MALIGNANT: ICD-10-CM

## 2019-06-25 DIAGNOSIS — S22.32XS: ICD-10-CM

## 2019-06-25 PROCEDURE — 71101 X-RAY EXAM UNILAT RIBS/CHEST: CPT

## 2019-06-26 ENCOUNTER — APPOINTMENT (OUTPATIENT)
Dept: LAB | Facility: IMAGING CENTER | Age: 79
End: 2019-06-26
Payer: COMMERCIAL

## 2019-06-26 DIAGNOSIS — E78.49 OTHER HYPERLIPIDEMIA: ICD-10-CM

## 2019-06-26 DIAGNOSIS — D64.9 ANEMIA, UNSPECIFIED TYPE: ICD-10-CM

## 2019-06-26 DIAGNOSIS — I10 ESSENTIAL HYPERTENSION, MALIGNANT: ICD-10-CM

## 2019-06-26 LAB
ALBUMIN SERPL BCP-MCNC: 3.9 G/DL (ref 3.5–5)
ALP SERPL-CCNC: 84 U/L (ref 46–116)
ALT SERPL W P-5'-P-CCNC: 27 U/L (ref 12–78)
ANION GAP SERPL CALCULATED.3IONS-SCNC: 5 MMOL/L (ref 4–13)
AST SERPL W P-5'-P-CCNC: 22 U/L (ref 5–45)
BASOPHILS # BLD AUTO: 0.05 THOUSANDS/ΜL (ref 0–0.1)
BASOPHILS NFR BLD AUTO: 1 % (ref 0–1)
BILIRUB SERPL-MCNC: 2.09 MG/DL (ref 0.2–1)
BUN SERPL-MCNC: 25 MG/DL (ref 5–25)
CALCIUM SERPL-MCNC: 8.6 MG/DL (ref 8.3–10.1)
CHLORIDE SERPL-SCNC: 105 MMOL/L (ref 100–108)
CHOLEST SERPL-MCNC: 212 MG/DL (ref 50–200)
CO2 SERPL-SCNC: 28 MMOL/L (ref 21–32)
CREAT SERPL-MCNC: 0.85 MG/DL (ref 0.6–1.3)
EOSINOPHIL # BLD AUTO: 0.12 THOUSAND/ΜL (ref 0–0.61)
EOSINOPHIL NFR BLD AUTO: 2 % (ref 0–6)
ERYTHROCYTE [DISTWIDTH] IN BLOOD BY AUTOMATED COUNT: 13.9 % (ref 11.6–15.1)
GFR SERPL CREATININE-BSD FRML MDRD: 82 ML/MIN/1.73SQ M
GLUCOSE P FAST SERPL-MCNC: 120 MG/DL (ref 65–99)
HCT VFR BLD AUTO: 46.3 % (ref 36.5–49.3)
HDLC SERPL-MCNC: 66 MG/DL (ref 40–60)
HGB BLD-MCNC: 14.7 G/DL (ref 12–17)
IMM GRANULOCYTES # BLD AUTO: 0.04 THOUSAND/UL (ref 0–0.2)
IMM GRANULOCYTES NFR BLD AUTO: 1 % (ref 0–2)
LDLC SERPL CALC-MCNC: 130 MG/DL (ref 0–100)
LYMPHOCYTES # BLD AUTO: 1.64 THOUSANDS/ΜL (ref 0.6–4.47)
LYMPHOCYTES NFR BLD AUTO: 21 % (ref 14–44)
MCH RBC QN AUTO: 30.9 PG (ref 26.8–34.3)
MCHC RBC AUTO-ENTMCNC: 31.7 G/DL (ref 31.4–37.4)
MCV RBC AUTO: 97 FL (ref 82–98)
MONOCYTES # BLD AUTO: 0.64 THOUSAND/ΜL (ref 0.17–1.22)
MONOCYTES NFR BLD AUTO: 8 % (ref 4–12)
NEUTROPHILS # BLD AUTO: 5.46 THOUSANDS/ΜL (ref 1.85–7.62)
NEUTS SEG NFR BLD AUTO: 67 % (ref 43–75)
NRBC BLD AUTO-RTO: 0 /100 WBCS
PMV BLD AUTO: 12 FL (ref 8.9–12.7)
POTASSIUM SERPL-SCNC: 4 MMOL/L (ref 3.5–5.3)
PROT SERPL-MCNC: 7.1 G/DL (ref 6.4–8.2)
RBC # BLD AUTO: 4.76 MILLION/UL (ref 3.88–5.62)
SODIUM SERPL-SCNC: 138 MMOL/L (ref 136–145)
TRIGL SERPL-MCNC: 79 MG/DL
WBC # BLD AUTO: 7.95 THOUSAND/UL (ref 4.31–10.16)

## 2019-06-26 PROCEDURE — 36415 COLL VENOUS BLD VENIPUNCTURE: CPT

## 2019-06-26 PROCEDURE — 80061 LIPID PANEL: CPT

## 2019-06-26 PROCEDURE — 80053 COMPREHEN METABOLIC PANEL: CPT

## 2019-06-26 PROCEDURE — 85025 COMPLETE CBC W/AUTO DIFF WBC: CPT

## 2019-07-11 ENCOUNTER — APPOINTMENT (EMERGENCY)
Dept: NON INVASIVE DIAGNOSTICS | Facility: HOSPITAL | Age: 79
DRG: 244 | End: 2019-07-11
Payer: COMMERCIAL

## 2019-07-11 ENCOUNTER — HOSPITAL ENCOUNTER (INPATIENT)
Facility: HOSPITAL | Age: 79
LOS: 2 days | Discharge: HOME | DRG: 244 | End: 2019-07-13
Attending: EMERGENCY MEDICINE | Admitting: ANESTHESIOLOGY
Payer: COMMERCIAL

## 2019-07-11 ENCOUNTER — APPOINTMENT (EMERGENCY)
Dept: RADIOLOGY | Facility: HOSPITAL | Age: 79
DRG: 244 | End: 2019-07-11
Payer: COMMERCIAL

## 2019-07-11 DIAGNOSIS — I10 HYPERTENSION: Chronic | ICD-10-CM

## 2019-07-11 DIAGNOSIS — E78.5 HYPERLIPIDEMIA: Primary | Chronic | ICD-10-CM

## 2019-07-11 DIAGNOSIS — G45.1 TIA INVOLVING CAROTID ARTERY: Chronic | ICD-10-CM

## 2019-07-11 PROBLEM — I45.9 HEART BLOCK: Status: ACTIVE | Noted: 2019-07-11

## 2019-07-11 PROBLEM — Z91.81 HISTORY OF RECENT FALL: Status: ACTIVE | Noted: 2019-07-11

## 2019-07-11 PROBLEM — R42 DIZZINESS: Status: ACTIVE | Noted: 2019-07-11

## 2019-07-11 PROBLEM — S22.42XA FRACTURE OF MULTIPLE RIBS OF LEFT SIDE: Status: ACTIVE | Noted: 2019-07-11

## 2019-07-11 LAB
ALBUMIN SERPL BCP-MCNC: 3.5 G/DL (ref 3.5–5)
ALP SERPL-CCNC: 181 U/L (ref 46–116)
ALT SERPL W P-5'-P-CCNC: 29 U/L (ref 12–78)
ANION GAP SERPL CALCULATED.3IONS-SCNC: 6 MMOL/L (ref 4–13)
APTT PPP: 24 SECONDS (ref 23–37)
AST SERPL W P-5'-P-CCNC: 24 U/L (ref 5–45)
ATRIAL RATE: 136 BPM
ATRIAL RATE: 61 BPM
BASOPHILS # BLD AUTO: 0.04 THOUSANDS/ΜL (ref 0–0.1)
BASOPHILS NFR BLD AUTO: 1 % (ref 0–1)
BILIRUB SERPL-MCNC: 0.7 MG/DL (ref 0.2–1)
BUN SERPL-MCNC: 14 MG/DL (ref 5–25)
CALCIUM SERPL-MCNC: 8.5 MG/DL (ref 8.3–10.1)
CHLORIDE SERPL-SCNC: 105 MMOL/L (ref 100–108)
CO2 SERPL-SCNC: 26 MMOL/L (ref 21–32)
CREAT SERPL-MCNC: 0.79 MG/DL (ref 0.6–1.3)
EOSINOPHIL # BLD AUTO: 0.14 THOUSAND/ΜL (ref 0–0.61)
EOSINOPHIL NFR BLD AUTO: 2 % (ref 0–6)
ERYTHROCYTE [DISTWIDTH] IN BLOOD BY AUTOMATED COUNT: 13.4 % (ref 11.6–15.1)
GFR SERPL CREATININE-BSD FRML MDRD: 85 ML/MIN/1.73SQ M
GLUCOSE SERPL-MCNC: 111 MG/DL (ref 65–140)
HCT VFR BLD AUTO: 43.8 % (ref 36.5–49.3)
HGB BLD-MCNC: 14.3 G/DL (ref 12–17)
IMM GRANULOCYTES # BLD AUTO: 0.02 THOUSAND/UL (ref 0–0.2)
IMM GRANULOCYTES NFR BLD AUTO: 0 % (ref 0–2)
INR PPP: 1.05 (ref 0.84–1.19)
LYMPHOCYTES # BLD AUTO: 1.23 THOUSANDS/ΜL (ref 0.6–4.47)
LYMPHOCYTES NFR BLD AUTO: 21 % (ref 14–44)
MAGNESIUM SERPL-MCNC: 2 MG/DL (ref 1.6–2.6)
MCH RBC QN AUTO: 30.6 PG (ref 26.8–34.3)
MCHC RBC AUTO-ENTMCNC: 32.6 G/DL (ref 31.4–37.4)
MCV RBC AUTO: 94 FL (ref 82–98)
MONOCYTES # BLD AUTO: 0.32 THOUSAND/ΜL (ref 0.17–1.22)
MONOCYTES NFR BLD AUTO: 6 % (ref 4–12)
NEUTROPHILS # BLD AUTO: 4.03 THOUSANDS/ΜL (ref 1.85–7.62)
NEUTS SEG NFR BLD AUTO: 70 % (ref 43–75)
NRBC BLD AUTO-RTO: 0 /100 WBCS
NT-PROBNP SERPL-MCNC: 816 PG/ML
P AXIS: 140 DEGREES
P AXIS: 85 DEGREES
PMV BLD AUTO: 10.3 FL (ref 8.9–12.7)
POTASSIUM SERPL-SCNC: 3.8 MMOL/L (ref 3.5–5.3)
PR INTERVAL: 232 MS
PROT SERPL-MCNC: 6.8 G/DL (ref 6.4–8.2)
PROTHROMBIN TIME: 13.3 SECONDS (ref 11.6–14.5)
QRS AXIS: 44 DEGREES
QRS AXIS: 46 DEGREES
QRSD INTERVAL: 90 MS
QRSD INTERVAL: 94 MS
QT INTERVAL: 406 MS
QT INTERVAL: 428 MS
QTC INTERVAL: 395 MS
QTC INTERVAL: 430 MS
RBC # BLD AUTO: 4.67 MILLION/UL (ref 3.88–5.62)
SODIUM SERPL-SCNC: 137 MMOL/L (ref 136–145)
T WAVE AXIS: 75 DEGREES
T WAVE AXIS: 78 DEGREES
TROPONIN I SERPL-MCNC: <0.02 NG/ML
TSH SERPL DL<=0.05 MIU/L-ACNC: 2.21 UIU/ML (ref 0.36–3.74)
VENTRICULAR RATE: 57 BPM
VENTRICULAR RATE: 61 BPM
WBC # BLD AUTO: 5.78 THOUSAND/UL (ref 4.31–10.16)

## 2019-07-11 PROCEDURE — 84484 ASSAY OF TROPONIN QUANT: CPT | Performed by: EMERGENCY MEDICINE

## 2019-07-11 PROCEDURE — 85025 COMPLETE CBC W/AUTO DIFF WBC: CPT | Performed by: EMERGENCY MEDICINE

## 2019-07-11 PROCEDURE — 36415 COLL VENOUS BLD VENIPUNCTURE: CPT | Performed by: EMERGENCY MEDICINE

## 2019-07-11 PROCEDURE — 99223 1ST HOSP IP/OBS HIGH 75: CPT | Performed by: ANESTHESIOLOGY

## 2019-07-11 PROCEDURE — 85610 PROTHROMBIN TIME: CPT | Performed by: EMERGENCY MEDICINE

## 2019-07-11 PROCEDURE — 99291 CRITICAL CARE FIRST HOUR: CPT

## 2019-07-11 PROCEDURE — 99284 EMERGENCY DEPT VISIT MOD MDM: CPT | Performed by: EMERGENCY MEDICINE

## 2019-07-11 PROCEDURE — 99223 1ST HOSP IP/OBS HIGH 75: CPT | Performed by: INTERNAL MEDICINE

## 2019-07-11 PROCEDURE — 83735 ASSAY OF MAGNESIUM: CPT | Performed by: EMERGENCY MEDICINE

## 2019-07-11 PROCEDURE — 96361 HYDRATE IV INFUSION ADD-ON: CPT

## 2019-07-11 PROCEDURE — 83880 ASSAY OF NATRIURETIC PEPTIDE: CPT | Performed by: EMERGENCY MEDICINE

## 2019-07-11 PROCEDURE — 71045 X-RAY EXAM CHEST 1 VIEW: CPT

## 2019-07-11 PROCEDURE — 96366 THER/PROPH/DIAG IV INF ADDON: CPT

## 2019-07-11 PROCEDURE — 93308 TTE F-UP OR LMTD: CPT

## 2019-07-11 PROCEDURE — 80053 COMPREHEN METABOLIC PANEL: CPT | Performed by: EMERGENCY MEDICINE

## 2019-07-11 PROCEDURE — 84443 ASSAY THYROID STIM HORMONE: CPT | Performed by: EMERGENCY MEDICINE

## 2019-07-11 PROCEDURE — 93010 ELECTROCARDIOGRAM REPORT: CPT | Performed by: INTERNAL MEDICINE

## 2019-07-11 PROCEDURE — 96365 THER/PROPH/DIAG IV INF INIT: CPT

## 2019-07-11 PROCEDURE — 85730 THROMBOPLASTIN TIME PARTIAL: CPT | Performed by: EMERGENCY MEDICINE

## 2019-07-11 PROCEDURE — 93005 ELECTROCARDIOGRAM TRACING: CPT

## 2019-07-11 RX ORDER — CLOPIDOGREL BISULFATE 75 MG/1
75 TABLET ORAL DAILY
Status: DISCONTINUED | OUTPATIENT
Start: 2019-07-11 | End: 2019-07-11

## 2019-07-11 RX ORDER — DOPAMINE HYDROCHLORIDE 160 MG/100ML
1-20 INJECTION, SOLUTION INTRAVENOUS
Status: DISCONTINUED | OUTPATIENT
Start: 2019-07-11 | End: 2019-07-11

## 2019-07-11 RX ORDER — ATORVASTATIN CALCIUM 40 MG/1
40 TABLET, FILM COATED ORAL DAILY
Status: DISCONTINUED | OUTPATIENT
Start: 2019-07-11 | End: 2019-07-11

## 2019-07-11 RX ORDER — CEFAZOLIN SODIUM 2 G/50ML
2000 SOLUTION INTRAVENOUS ONCE
Status: DISCONTINUED | OUTPATIENT
Start: 2019-07-12 | End: 2019-07-13 | Stop reason: HOSPADM

## 2019-07-11 RX ORDER — DOPAMINE HYDROCHLORIDE 160 MG/100ML
1-20 INJECTION, SOLUTION INTRAVENOUS
Status: DISCONTINUED | OUTPATIENT
Start: 2019-07-11 | End: 2019-07-12

## 2019-07-11 RX ORDER — PANTOPRAZOLE SODIUM 40 MG/1
40 TABLET, DELAYED RELEASE ORAL
Status: DISCONTINUED | OUTPATIENT
Start: 2019-07-11 | End: 2019-07-11

## 2019-07-11 RX ORDER — ACETAMINOPHEN 325 MG/1
650 TABLET ORAL EVERY 6 HOURS PRN
Status: DISCONTINUED | OUTPATIENT
Start: 2019-07-11 | End: 2019-07-13 | Stop reason: HOSPADM

## 2019-07-11 RX ORDER — THIAMINE MONONITRATE (VIT B1) 100 MG
100 TABLET ORAL 2 TIMES DAILY
Status: DISCONTINUED | OUTPATIENT
Start: 2019-07-11 | End: 2019-07-13 | Stop reason: HOSPADM

## 2019-07-11 RX ORDER — ATROPINE SULFATE 0.1 MG/ML
INJECTION INTRAVENOUS
Status: DISPENSED
Start: 2019-07-11 | End: 2019-07-11

## 2019-07-11 RX ORDER — CHLORAL HYDRATE 500 MG
1000 CAPSULE ORAL DAILY
Status: DISCONTINUED | OUTPATIENT
Start: 2019-07-11 | End: 2019-07-13 | Stop reason: HOSPADM

## 2019-07-11 RX ORDER — ASPIRIN 81 MG/1
81 TABLET, CHEWABLE ORAL DAILY
Status: DISCONTINUED | OUTPATIENT
Start: 2019-07-11 | End: 2019-07-13 | Stop reason: HOSPADM

## 2019-07-11 RX ORDER — LANOLIN ALCOHOL/MO/W.PET/CERES
400 CREAM (GRAM) TOPICAL DAILY
Status: DISCONTINUED | OUTPATIENT
Start: 2019-07-11 | End: 2019-07-13 | Stop reason: HOSPADM

## 2019-07-11 RX ADMIN — FOLIC ACID TAB 400 MCG 400 MCG: 400 TAB at 17:41

## 2019-07-11 RX ADMIN — DOPAMINE HYDROCHLORIDE IN DEXTROSE 10 MCG/KG/MIN: 1.6 INJECTION, SOLUTION INTRAVENOUS at 09:23

## 2019-07-11 RX ADMIN — ENOXAPARIN SODIUM 40 MG: 40 INJECTION SUBCUTANEOUS at 12:54

## 2019-07-11 RX ADMIN — THIAMINE HCL TAB 100 MG 100 MG: 100 TAB at 12:54

## 2019-07-11 RX ADMIN — Medication 1000 MG: at 12:54

## 2019-07-11 RX ADMIN — ASPIRIN 81 MG 81 MG: 81 TABLET ORAL at 12:54

## 2019-07-11 RX ADMIN — DOPAMINE HYDROCHLORIDE IN DEXTROSE 5 MCG/KG/MIN: 1.6 INJECTION, SOLUTION INTRAVENOUS at 19:06

## 2019-07-11 RX ADMIN — SODIUM CHLORIDE 1000 ML: 0.9 INJECTION, SOLUTION INTRAVENOUS at 08:03

## 2019-07-11 RX ADMIN — THIAMINE HCL TAB 100 MG 100 MG: 100 TAB at 17:41

## 2019-07-11 NOTE — ED PROVIDER NOTES
History  Chief Complaint   Patient presents with    Dizziness     pt recent diagnosis of vertigo approx 10 days ago  pt states that "he gets these bad head rushes" during an episode his HR drops to 30 per EMS  pt had a fall 10 days ago with fx ribs d/t dizziness       Emergency Department Note- Loida Moore [de-identified] y o  male MRN: [id number]    Unit/Bed#: ED 01 Encounter: [id number]        History of Present Illness  HPI:  Loida Moore is a [de-identified] y o  male who presents with intermittent episodes of dizziness x 2 weeks  Patient states that the 1st episode occurred approximately 2-3 weeks ago while walking steps at his daughter's home  He states that he did not pass out but he did lose balance causing him to fall into the railing on steps and ultimately fractured 2 ribs that were diagnosed at urgent care  Patient states that since that time he has had continued intermittent episodes where he gets a "whoosh"sensation in his head  He denies any associated chest pain shortness of breath or dyspnea  He denies any visual changes  He denies any focal numbness weakness or tingling  He denies any headache  He does not actually pass out and the episodes last only seconds  He was going to his PCP today to review recent routine blood work and his current symptoms, when he noted his symptoms had increased and decided instead to call 911  En route, the patient was noted to have a heart rate in the 30s  This continued upon arrival here in the emergency department  He was immediately placed on pacing pads  Patient has not loss consciousness during these episodes and they last for approximately 1-2 seconds  Rhythm strips reveal PVCs followed by a pause  Patient denies any similar experiences  He does have a history of CAD status post CVA and carotid endarterectomy approximately 3 years ago  He states that he does not currently take any medicines besides natural supplements      REVIEW OF SYSTEMS  Constitutional:  Denies fever or chills   Eyes:  Denies change in visual acuity   HENT:  Denies nasal congestion or sore throat   Respiratory:  Denies cough or shortness of breath   Cardiovascular:  Denies chest pain or edema, + lightheadedness/dizziness  GI:  Denies abdominal pain, nausea, vomiting, bloody stools or diarrhea   :  Denies dysuria   Musculoskeletal:  Denies back pain or joint pain   Integument:  Denies rash   Neurologic:  Denies headache, focal weakness or sensory changes   Endocrine:  Denies polyuria or polydipsia   Lymphatic:  Denies swollen glands   Psychiatric:  Denies depression or anxiety     Historical Information  Past Medical History:  2016: Acute ischemic left MCA stroke (HCC)  No date: Hyperlipidemia  No date: TIA (transient ischemic attack)  Past Surgical History:  2/10/2016: CAROTID ENDARTARECTOMY; Left      Comment:  Procedure: ENDARTERECTOMY ARTERY CAROTID  with patch                graft  introp duplex scan;  Surgeon: Asaf Marroquin MD;                 Location: [facility];  Service:   2016: ESOPHAGOGASTRODUODENOSCOPY; N/A      Comment:  Procedure: ESOPHAGOGASTRODUODENOSCOPY (EGD); Surgeon:                Otilia Atkins MD;  Location: [facility]; Service:   2016: ESOPHAGOGASTRODUODENOSCOPY; N/A      Comment:  Procedure: ESOPHAGOGASTRODUODENOSCOPY (EGD); Surgeon:                Otilia Atkins MD;  Location: [facility]; Service:   No date: NO PAST SURGERIES  Social History  Social History    Substance and Sexual Activity      Alcohol use:  Yes        Alcohol/week: 6 0 standard drinks        Types: 6 Cans of beer per week        Comment: reduced ~1 week ago from 6-10/day (42+ /wk)    Social History    Substance and Sexual Activity      Drug use: No    Social History    Tobacco Use      Smoking status: Former Smoker        Packs/day: 1 00        Years: 15 00        Pack years: 15        Types: Cigarettes        Start date:         Quit date:         Years since quittin 9    Family History: Review of patient's family history indicates:  Problem: Leukemia      Relation: Brother          Age of Onset: (Not Specified)  Problem: Leukemia      Relation: Mother          Age of Onset: (Not Specified)      Meds/Allergies  (Not in a hospital admission)    No Known Allergies    Objective  Vitals: Blood pressure (!) 172/85, pulse 71, temperature 97 7 °F (36 5 °C), temperature source Oral, resp  rate 18, weight 109 kg (240 lb), SpO2 96 %  PHYSICAL EXAM  Constitutional:  Well developed, well nourished, no acute distress, non-toxic appearance, anxious  Eyes:  PERRL, conjunctiva normal, MMM  HENT:  Atraumatic, external ears normal, nose normal, oropharynx moist, no pharyngeal exudates  Neck- normal range of motion, no tenderness, supple   Respiratory:  No respiratory distress, normal breath sounds, no rales, no wheezing   Cardiovascular:  Normal rate however with episodes of  Bradycardia and pause, + AS murmur, no gallops, no rubs   GI:  Soft, nondistended, normal bowel sounds, nontender, no organomegaly, no mass, no rebound, no guarding   :  No costovertebral angle tenderness   Musculoskeletal:  Trace b/l LE edema, no tenderness, no deformities  Back- no tenderness  Integument:  Well hydrated, no rash   Lymphatic:  No lymphadenopathy noted   Neurologic:  Alert & oriented x 3, CN 2-12 normal, normal motor function, normal sensory function, no focal deficits noted   Psychiatric:  Speech and behavior appropriate     Lab Results: Lab Results: I have personally reviewed pertinent lab results  Imaging: I have personally reviewed pertinent reports  EKG, Pathology, and Other Studies: I have personally reviewed pertinent films in PACS      Assessment/Plan    ED Medical Decision Makin-year-old male with significant cardiovascular history who presents with dizziness and found to be bradycardic with prolonged pauses on his EKG and on monitor    Pacing pads immediately placed on patient with atropine at bedside  Supplemental O2 support applied as well as IV fluid hydration up at bedside  Cardiology stat paged who ordered an echocardiogram and will come to see patient in the emergency department  Lab sent  Will monitor patient closely  Portions of the record may have been created with voice recognition software  Occasional wrong word or "sound-a-like" substitutions may have occurred due to the inherent limitations of voice recognition software  Review chart carefully and recognize, using context, where substitutions have occurred  Prior to Admission Medications   Prescriptions Last Dose Informant Patient Reported? Taking? CO-ENZYME Q-10 PO   Yes No   Sig: Take 1 tablet by mouth daily  Omega-3 Fatty Acids (FISH OIL) 1,000 mg   Yes No   Sig: Take 1,000 mg by mouth daily  aspirin 81 MG tablet   Yes No   Sig: Take 81 mg by mouth daily  atorvastatin (LIPITOR) 40 mg tablet   Yes No   Sig: Take 40 mg by mouth daily  bisoprolol (ZEBETA) 5 mg tablet   Yes No   Sig: Take 2 5 mg by mouth daily  clopidogrel (PLAVIX) 75 mg tablet   Yes No   Sig: Take 75 mg by mouth daily  folic acid (FOLVITE) 618 MCG tablet   Yes No   Sig: Take 400 mcg by mouth daily  pantoprazole (PROTONIX) 40 mg tablet   Yes No   Sig: Take 40 mg by mouth 2 (two) times a day  thiamine 100 MG tablet   Yes No   Sig: Take 100 mg by mouth 2 (two) times a day  Facility-Administered Medications: None       Past Medical History:   Diagnosis Date    Acute ischemic left MCA stroke ([facility] [address] ) 2/4/2016    Hyperlipidemia     TIA (transient ischemic attack)        Past Surgical History:   Procedure Laterality Date    CAROTID ENDARTARECTOMY Left 2/10/2016    Procedure: ENDARTERECTOMY ARTERY CAROTID  with patch graft  introp duplex scan;  Surgeon: Jhoana Fay MD;  Location: [facility];  Service:     ESOPHAGOGASTRODUODENOSCOPY N/A 5/23/2016    Procedure: ESOPHAGOGASTRODUODENOSCOPY (EGD);   Surgeon: Arlene Red MD;  Location: [facility];  Service:     ESOPHAGOGASTRODUODENOSCOPY N/A 2016    Procedure: ESOPHAGOGASTRODUODENOSCOPY (EGD); Surgeon: Eric Marquez MD;  Location: [facility]; Service:     NO PAST SURGERIES         Family History   Problem Relation Age of Onset    Leukemia Brother     Leukemia Mother      I have reviewed and agree with the history as documented  Social History     Tobacco Use    Smoking status: Former Smoker     Packs/day: 1 00     Years: 15 00     Pack years: 15 00     Types: Cigarettes     Start date:      Last attempt to quit:      Years since quittin 5   Substance Use Topics    Alcohol use:  Yes     Alcohol/week: 6 0 standard drinks     Types: 6 Cans of beer per week     Comment: reduced ~1 week ago from 6-10/day (42+ /wk)    Drug use: No        Review of Systems    Physical Exam  Physical Exam    Vital Signs  ED Triage Vitals   Temperature Pulse Respirations Blood Pressure SpO2   19 0759 19 0732 19 0732 19 0732 19 0732   97 7 °F (36 5 °C) (!) 37 18 (!) 172/85 96 %      Temp Source Heart Rate Source Patient Position - Orthostatic VS BP Location FiO2 (%)   19 0759 19 0732 19 0732 19 0732 --   Oral Monitor Lying Left arm       Pain Score       --                  Vitals:    19 0732 19 0736   BP: (!) 172/85    Pulse: (!) 37 71   Patient Position - Orthostatic VS: Lying          Visual Acuity      ED Medications  Medications   sodium chloride 0 9 % bolus 1,000 mL (1,000 mL Intravenous New Bag 19 0803)   atropine 1 mg/10 mL injection **ADS Override Pull** (0 mg  Hold 19 0804)       Diagnostic Studies  Results Reviewed     Procedure Component Value Units Date/Time    Comprehensive metabolic panel [[id number]]  (Abnormal) Collected:  19 0748    Lab Status:  Final result Specimen:  Blood from Arm, Left Updated:  19 4495     Sodium 137 mmol/L      Potassium 3 8 mmol/L      Chloride 105 mmol/L      CO2 26 mmol/L ANION GAP 6 mmol/L      BUN 14 mg/dL      Creatinine 0 79 mg/dL      Glucose 111 mg/dL      Calcium 8 5 mg/dL      AST 24 U/L      ALT 29 U/L      Alkaline Phosphatase 181 U/L      Total Protein 6 8 g/dL      Albumin 3 5 g/dL      Total Bilirubin 0 70 mg/dL      eGFR 85 ml/min/1 73sq m     Narrative:       [place] guidelines for Chronic Kidney Disease (CKD):     Stage 1 with normal or high GFR (GFR > 90 mL/min/1 73 square meters)    Stage 2 Mild CKD (GFR = 60-89 mL/min/1 73 square meters)    Stage 3A Moderate CKD (GFR = 45-59 mL/min/1 73 square meters)    Stage 3B Moderate CKD (GFR = 30-44 mL/min/1 73 square meters)    Stage 4 Severe CKD (GFR = 15-29 mL/min/1 73 square meters)    Stage 5 End Stage CKD (GFR <15 mL/min/1 73 square meters)  Note: GFR calculation is accurate only with a steady state creatinine    Magnesium [[id number]]  (Normal) Collected:  07/11/19 0748    Lab Status:  Final result Specimen:  Blood from Arm, Left Updated:  07/11/19 0822     Magnesium 2 0 mg/dL     Protime-INR [[id number]]  (Normal) Collected:  07/11/19 0748    Lab Status:  Final result Specimen:  Blood from Arm, Left Updated:  07/11/19 0817     Protime 13 3 seconds      INR 1 05    APTT [[id number]]  (Normal) Collected:  07/11/19 0748    Lab Status:  Final result Specimen:  Blood from Arm, Left Updated:  07/11/19 0817     PTT 24 seconds     Troponin I [[id number]] Collected:  07/11/19 0748    Lab Status: In process Specimen:  Blood from Arm, Left Updated:  07/11/19 0759    B-type natriuretic peptide [[id number]] Collected:  07/11/19 0748    Lab Status: In process Specimen:  Blood from Arm, Left Updated:  07/11/19 0758    TSH, 3rd generation with Free T4 reflex [[id number]] Collected:  07/11/19 0748    Lab Status: In process Specimen:  Blood from Arm, Left Updated:  07/11/19 0758    CBC and differential [[id number]] Collected:  07/11/19 0748    Lab Status:   In process Specimen:  Blood from Arm, Left Updated: 07/11/19 0758                 XR chest 1 view portable    (Results Pending)              Procedures  ECG 12 Lead Documentation Only  Date/Time: 7/13/2019 5:30 PM  Performed by: Isai Rudd MD  Authorized by: Isai Rudd MD     Patient location:  ED  Rhythm:     Rhythm: sinus bradycardia and A-V block    Ectopy:     Ectopy: PVCs             ED Course  ED Course as of Jul 13 1727   Thu Jul 11, 2019   0751 SPoke with Dr Amanda Anand from cardiology  She will send someone to see the patient  7625 ECHO at bedside  Pt is in NAD, VSS  however still with intermittent episodes of pauses and bradycardia      0904 EP Dr Aiden Bonilla at bedside, will start dopamine drip, continue to monitor and admit      1044 Pt improved on 5 mcg of dopamine  1044 Discussed with CC who will come to eval pt              Identification of Seniors at Risk      Most Recent Value   (ISAR) Identification of Seniors at Risk   Before the illness or injury that brought you to the Emergency, did you need someone to help you on a regular basis? 0 Filed at: 07/11/2019 0736   In the last 24 hours, have you needed more help than usual?  0 Filed at: 07/11/2019 4743   Have you been hospitalized for one or more nights during the past 6 months? 0 Filed at: 07/11/2019 0736   In general, do you see well?  0 Filed at: 07/11/2019 0736   In general, do you have serious problems with your memory? 0 Filed at: 07/11/2019 0736   Do you take more than three different medications every day? 1 Filed at: 07/11/2019 0736   ISAR Score  1 Filed at: 07/11/2019 7196                          MDM    Disposition  Final diagnoses:   None     ED Disposition     None      Follow-up Information    None         Patient's Medications   Discharge Prescriptions    No medications on file     No discharge procedures on file      ED Provider  Electronically Signed by           Isai Rudd MD  07/13/19 0792

## 2019-07-11 NOTE — CONSULTS
Consultation - Electrophysiology-Cardiology (EP)   Esther Maurer [de-identified] y o  male MRN: [id number]  Unit/Bed#: ED 01 Encounter: [id number]      Consults    Assessment/Plan   1  Paroxysmal high-grade AV block   -New diagnosis with patient having presyncopal episodes    2  Essential hypertension    -Stopped taking Bystolic over a year ago    3  Preserved LVEF   -TTE obtained, pending read    4  Hyperlipidemia   -currently on niacin which was exchanged from Lipitor a year ago    5  Previous CVA, left MCA stroke 2/2016   -Carotid artery stenosis status post left endarterectomy 2/2016   -no residual sequelae    6  Obesity   -witnessed snoring and apneic episodes by wife, day time tiredness   -high suspicion for SABIHA    7  Alcohol use disorder    -as per wife and daughter, patient has at least 1 case of "tall boys" beer a week   -last drink a few days ago    Assessment and Plan:  1  Placed on dopamine drip at a rate of 5, plan for permanent pacemaker tomorrow    2  Procedure likely to be performed by Dr Carmen Becker    3  Consider placing on CIWA protocol to avoid withdrawal symptoms    4  Continue aspirin, fish oil, folic acid and thiamine; consider resuming daily multivitamin    5  Consider outpatient polysomnography    6  If paroxysmal AV block recurs contact Cardiology for temporary pacemaker placement      Case discussed and reviewed with Dr Miles Valles  Plain is not final, pending attending addendum  Thank you for involving us in the care of your patient        History of Present Illness   Physician Requesting Consult: Louis Conteh MD  Reason for Consult / Principal Problem:  Paroxysmal AV block versus pauses    HPI: Esther Maurer is a [de-identified]y o  year old male with a history of left-sided MCA stroke the setting of left carotid artery stenosis 02/2016, requiring left carotid endarterectomy 02/2016, no residual weakness/aphasia, hypertension, hyperlipidemia and alcohol use disorder who presented to [facility] with a three-week history of lightheadedness  Patient reports repeated episodes of lightheadedness with associated prodromal symptoms such as head rush/warmth but no headache, visual disturbances, chest pain, dizziness, tinnitus, and Downs,      Review of Systems  ROS as noted above, otherwise 12 point review of systems was performed and is negative  Historical Information   Past Medical History:   Diagnosis Date    Acute ischemic left MCA stroke ([facility] [address] ) 2016    Hyperlipidemia     TIA (transient ischemic attack)      Past Surgical History:   Procedure Laterality Date    CAROTID ENDARTARECTOMY Left 2/10/2016    Procedure: ENDARTERECTOMY ARTERY CAROTID  with patch graft  introp duplex scan;  Surgeon: Grace Ganser, MD;  Location: [facility];  Service:     ESOPHAGOGASTRODUODENOSCOPY N/A 2016    Procedure: ESOPHAGOGASTRODUODENOSCOPY (EGD); Surgeon: Gerardo Welsh MD;  Location: [facility]; Service:     ESOPHAGOGASTRODUODENOSCOPY N/A 2016    Procedure: ESOPHAGOGASTRODUODENOSCOPY (EGD); Surgeon: Gerardo Welsh MD;  Location: [facility];   Service:     NO PAST SURGERIES       Social History     Substance and Sexual Activity   Alcohol Use Yes    Alcohol/week: 6 0 standard drinks    Types: 6 Cans of beer per week    Comment: reduced ~1 week ago from 6-10/day (42+ /wk)     Social History     Substance and Sexual Activity   Drug Use No     Social History     Tobacco Use   Smoking Status Former Smoker    Packs/day: 1 00    Years: 15 00    Pack years: 15 00    Types: Cigarettes    Start date:     Last attempt to quit: Ilsa Olson Years since quittin 9     Family History:   Family History   Problem Relation Age of Onset    Leukemia Brother     Leukemia Mother        Meds/Allergies   Hospital Medications: Current Facility-Administered Medications   Medication Dose Route Frequency    atropine 1 mg/10 mL injection **ADS Override Pull**        DOPamine (INTROPIN) 400 mg in 250 mL infusion (premix)  1-20 mcg/kg/min Intravenous Titrated     Home Medications:   (Not in a hospital admission)    No Known Allergies    Objective   Vitals: Blood pressure (!) 196/88, pulse 64, temperature 97 7 °F (36 5 °C), temperature source Oral, resp  rate 20, weight 117 kg (257 lb 8 oz), SpO2 97 %  Orthostatic Blood Pressures      Most Recent Value   Blood Pressure  (!) 196/88 filed at 2019 0900   Patient Position - Orthostatic VS  Lying filed at 2019 0732          No intake or output data in the 24 hours ending 19 0948    Invasive Devices     Peripheral Intravenous Line            Peripheral IV 19 Left Wrist less than 1 day                Physical Exam    Lab Results: I have personally reviewed pertinent lab results  Results from last 7 days   Lab Units 19  0748   POTASSIUM mmol/L 3 8   CHLORIDE mmol/L 105   CO2 mmol/L 26   BUN mg/dL 14   CREATININE mg/dL 0 79   CALCIUM mg/dL 8 5     Results from last 7 days   Lab Units 19  0748   INR  1 05   PTT seconds 24     Results from last 7 days   Lab Units 19  0748   MAGNESIUM mg/dL 2 0       Imaging: I have personally reviewed pertinent reports  ECHO:   Results for orders placed during the hospital encounter of 16   Echo complete with contrast if indicated    Narrative Jose [address] [place] [address] 35  [place], [address]  [phone]    Transthoracic Echocardiogram  2D, M-mode, Doppler, and Color Doppler    Study date:  2016    Patient: Abad Velazquez  MR number: [id number]  Account number: [de-identified]  : 1940  Age: 66 years  Gender: Male  Status: Inpatient  Location: Bedside  Height: 70 in  Weight: 218 5 lb  BP: 122/ 59 mmHg    Indications: Evaluate for suspected cardiac source of embolism      Diagnoses: G45 9 - Transient cerebral ischemic attack, unspecified    Sonographer:  TAL Harper  Primary Physician:  Irving Neil MD  Referring Physician:  Kaykay Briceno Garima Palacios MD  Group:  [id number]  Interpreting Physician:  Elkin Mccloud MD    IMPRESSIONS:  There was no diagnostic echocardiographic evidence for a cardiac source of  embolism  SUMMARY    PROCEDURE INFORMATION:  Echocardiographic views were limited due to restricted patient mobility and  poor patient compliance  LEFT VENTRICLE:  Systolic function was normal by visual assessment  Ejection fraction was  estimated to be 60 %  There were no regional wall motion abnormalities  Wall thickness was moderately to markedly increased  There was moderate concentric hypertrophy  Doppler parameters were consistent with abnormal left ventricular relaxation  (grade 1 diastolic dysfunction)  MITRAL VALVE:  There was moderate annular calcification  There was mild regurgitation  AORTIC VALVE:  Transaortic velocity was increased due to valvular stenosis  There was mild stenosis  There was mild to moderate regurgitation  Valve mean gradient was 18 95 mmHg  TRICUSPID VALVE:  There was mild to moderate regurgitation  Pulmonary artery systolic pressure was within the normal range  Estimated peak PA pressure was 35 mmHg  PULMONIC VALVE:  Not well visualized  AORTA:  The root exhibited mild dilatation  HISTORY: Consistent with transient ischemic attack or stroke  PRIOR HISTORY:  Risk factors: alcohol abuse  PROCEDURE: The procedure was performed at the bedside  This was a routine  study  The transthoracic approach was used  The study included complete 2D  imaging, M-mode, complete spectral Doppler, and color Doppler  Echocardiographic views were limited due to restricted patient mobility and  poor patient compliance  This was a technically difficult study  LEFT VENTRICLE: Size was normal  Systolic function was normal by visual  assessment  Ejection fraction was estimated to be 60 %  There were no regional  wall motion abnormalities   Wall thickness was moderately to markedly increased  There was moderate concentric hypertrophy  No evidence of apical thrombus  DOPPLER: Doppler parameters were consistent with abnormal left ventricular  relaxation (grade 1 diastolic dysfunction)  RIGHT VENTRICLE: The size was normal  Systolic function was normal  Wall  thickness was normal     LEFT ATRIUM: Size was normal     RIGHT ATRIUM: Size was normal     MITRAL VALVE: There was moderate annular calcification  Valve structure was  normal  There was normal leaflet separation  DOPPLER: The transmitral velocity  was within the normal range  There was no evidence for stenosis  There was mild  regurgitation  AORTIC VALVE: DOPPLER: Transaortic velocity was increased due to valvular  stenosis  There was mild stenosis  There was mild to moderate regurgitation  TRICUSPID VALVE: The valve structure was normal  There was normal leaflet  separation  DOPPLER: The transtricuspid velocity was within the normal range  There was no evidence for stenosis  There was mild to moderate regurgitation  Pulmonary artery systolic pressure was within the normal range  Estimated peak  PA pressure was 35 mmHg  PULMONIC VALVE: Not well visualized  DOPPLER: The transpulmonic velocity was  within the normal range  There was no significant regurgitation  PERICARDIUM: There was no pericardial effusion  The pericardium was normal in  appearance  AORTA: The root exhibited mild dilatation  SYSTEMIC VEINS: IVC: The inferior vena cava was normal in size      MEASUREMENT TABLES    DOPPLER MEASUREMENTS  Aortic valve   (Reference normals)  Peak itzel   2 93 cm/s   (--)  Mean itzel   2 05 cm/s   (--)  Peak gradient   34 27 mmHg   (--)  Mean gradient   18 95 mmHg   (--)  Valve area   1 5 cm squared   (--)  Valve area, cont   1 5 cm squared   (--)    SYSTEM MEASUREMENT TABLES    2D  %FS: 32 1 %  AV Diam: 3 9 cm  EDV(Teich): 84 9 ml  EF(Teich): 60 6 %  ESV(Teich): 33 5 ml  IVSd: 1 8 cm  LA Area: 22 4 cm2  LA Diam: 3 6 cm  LVEDV MOD A4C: 79 2 ml  LVEF MOD A4C: 66 8 %  LVESV MOD A4C: 26 3 ml  LVIDd: 4 3 cm  LVIDs: 2 9 cm  LVLd A4C: 7 2 cm  LVLs A4C: 6 2 cm  LVOT Diam: 2 2 cm  LVPWd: 1 6 cm  RA Area: 23 7 cm2  RV Diam : 2 9 cm  SI(Teich): 23 7 ml/m2  SV MOD A4C: 52 9 ml  SV(Cube): 56 2 ml  SV(Teich): 51 4 ml    CW  AV Env  Ti: 310 5 ms  AV VTI: 63 6 cm  AV Vmax: 2 9 m/s  AV Vmean: 2 m/s  AV maxP 3 mmHg  AV meanP mmHg  TR Vmax: 2 5 m/s  TR maxP 1 mmHg    PW  HELEN (VTI): 1 7 cm2  HELEN Vmax: 1 7 cm2  E': 0 1 m/s  E/E': 16 4  LVOT Env  Ti: 354 9 ms  LVOT VTI: 27 9 cm  LVOT Vmax: 1 2 m/s  LVOT Vmean: 0 8 m/s  LVOT maxP 2 mmHg  LVOT meanPG: 3 mmHg  MV A Jules: 1 1 m/s  MV Dec [place]: 3 8 m/s2  MV DecT: 259 1 ms  MV E Jules: 1 m/s  MV E/A Ratio: 0 9    [employer] Accredited Echocardiography Laboratory    Prepared and electronically signed by    Corinna Fernandes MD  Signed [id number] 15:58:12         CATH/STRESS TEST: None    EK/11 @ 7:45AM       @ 9:47AM        VTE Prophylaxis: Enoxaparin (Lovenox)      Counseling / Coordination of Care  Total floor / unit time spent today 20 minutes minutes  Greater than 50% of total time was spent with the patient and / or family counseling and / or coordination of care  A description of the counseling / coordination of care: education

## 2019-07-11 NOTE — SOCIAL WORK
Cm met with pt at [facility] accompanied with wife and daughter to complete assessment and discuss cm role and d/c needs  Pt live in a 1 SH with 1 SALVADOR to get in  Prior to admission, Pt was independent with all ADL's and used no DME's  Pt is retired and drive self, but daughter Jose Francisco Cao [phone] will transport at d/c  Pt denies MH or D&A treatments  Pt has no POA or living will  Wife Gladys Pineda [phone] is pt emergency contact  PCP is Jaden Reagan with [facility]  Pt has prescription coverage and uses [facility] pharmacy on  [address] in [place], but pt will use [facility] for meds at D/C  CM reviewed d/c planning process including the following: identifying help at home, patient preference for d/c planning needs, Discharge Lounge, [facility] Meds to Bed program, availability of treatment team to discuss questions or concerns patient and/or family may have regarding understanding medications and recognizing signs and symptoms once discharged  CM also encouraged patient to follow up with all recommended appointments after discharge  Patient advised of importance for patient and family to participate in managing patients medical well being

## 2019-07-11 NOTE — RESPIRATORY THERAPY NOTE
RT Protocol Note  Beverely Buerger [de-identified] y o  male MRN: [id number]  Unit/Bed#: [facility] 513-01 Encounter: [id number]    Assessment    Principal Problem:    Heart block  Active Problems:    Hypertension    CVA (cerebral vascular accident) ([facility] )    Carotid artery stenosis with cerebral infarction ([facility] )    Hyperlipidemia    Fracture of multiple ribs of left side    History of recent fall      Home Pulmonary Medications:  none       Past Medical History:   Diagnosis Date    Acute ischemic left MCA stroke ([facility] ) 2016    Hyperlipidemia     TIA (transient ischemic attack)      Social History     Socioeconomic History    Marital status: /Civil Union     Spouse name: None    Number of children: None    Years of education: None    Highest education level: None   Occupational History    None   Social Needs    Financial resource strain: None    Food insecurity:     Worry: None     Inability: None    Transportation needs:     Medical: None     Non-medical: None   Tobacco Use    Smoking status: Former Smoker     Packs/day: 1 00     Years: 15 00     Pack years: 15 00     Types: Cigarettes     Start date:      Last attempt to quit:      Years since quittin 5   Substance and Sexual Activity    Alcohol use:  Yes     Alcohol/week: 6 0 standard drinks     Types: 6 Cans of beer per week     Comment: reduced ~1 week ago from 6-10/day (42+ /wk)    Drug use: No    Sexual activity: None   Lifestyle    Physical activity:     Days per week: None     Minutes per session: None    Stress: None   Relationships    Social connections:     Talks on phone: None     Gets together: None     Attends [employer] service: None     Active member of club or organization: None     Attends meetings of clubs or organizations: None     Relationship status: None    Intimate partner violence:     Fear of current or ex partner: None     Emotionally abused: None     Physically abused: None     Forced sexual activity: None   Other Topics Concern    None   Social History Narrative    Ret   Lives in [place]  High school education  Wife manages finances  Subjective         Objective    Physical Exam:   Assessment Type: Assess only  General Appearance: Alert, Awake  Respiratory Pattern: Normal  Chest Assessment: Chest expansion symmetrical  Bilateral Breath Sounds: Clear, Diminished  Cough: None    Vitals:  Blood pressure 163/67, pulse 82, temperature 97 7 °F (36 5 °C), temperature source Oral, resp  rate (!) 27, height 5' 10" (1 778 m), weight 117 kg (257 lb 8 oz), SpO2 98 %  Imaging and other studies: I have personally reviewed pertinent reports  Plan    Respiratory Plan: No distress/Pulmonary history, Discontinue Protocol        Resp Comments: Pt  admitted for heart block  Pt  is a past smoker of almost 50 years ago  No pulm hx  No meds at home  Pt  did fall  a few days ago with 2 fx ribs  Pt  states he has no pain from this  CXR shows vascular congestion  No resp  distress noted  No resp  therapy indicated at this time  Will d/c resp  / protocol

## 2019-07-11 NOTE — H&P
History and Physical - Critical Care   Billy Thompson [de-identified] y o  male MRN: [id number]  Unit/Bed#: [facility] 853-85 Encounter: [id number]      Reason for Admission/Chief Complaint   Dizziness secondary to complete heart block      History of Present Illness   Billy Thompson is a [de-identified] y o  male who presents with complaints of dizziness and a rushing feeling to his head  The symptoms have occurred for approximately 10 days and caused him to fall twice  On 06/25/2019 a chest x-ray was completed after he fell and showed left-sided 5th and 6th rib fractures  He has no pain from his fractures at this time  Today, the patient felt that the symptoms were a bit worse and he could not mow his lawn yesterday  It prompted him to call EMS and he was transported to [facility] Emergency Department  While in route, the his heart rate was noted to be in the 30s  Upon arrival to emergency department an EKG was completed and showed paroxysmal atrioventricular block  The patient was placed on dopamine at that time  Electrophysiology examined the patient in the emergency department and determined that the patient should be admitted to intensive care unit and will need pacemaker placement  The patient's chart shows he has a significant past medical history of a left MCA stroke, 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, 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  He also has a history of hyperlipidemia, essential tremor, hypertension, healed gastric ulcer status post EGD, anemia and hypertension  A review of the patient's chart from a previous admission listed that the patient was taking Plavix, aspirin, Lipitor with Co Q10, Protonix, thiamine, folic acid, and fish oil  However, the patient states that he only takes aspirin, thiamine, folic acid, fish oil and Sol palmetto at this time  History obtained from his wife was obtained and she stated that he has seasonal allergies and takes Claritin    In a previous no from the patient's chart listed hypothyroidism in his past medical history  The patient and his daughter provides history that he has never been diagnosed with hypothyroidism  A TSH 3rd generation was drawn in the emergency department and resulted at 2 210  He does not take any blood pressure medications at this time and follows with his family physician  History obtained from the patient  Past Medical History     Past Medical History:   Diagnosis Date    Acute ischemic left MCA stroke ([address] ) 2016    Hyperlipidemia     TIA (transient ischemic attack)        Past Surgical History     Past Surgical History:   Procedure Laterality Date    CAROTID ENDARTARECTOMY Left 2/10/2016    Procedure: ENDARTERECTOMY ARTERY CAROTID  with patch graft  introp duplex scan;  Surgeon: Cinthya Steinberg MD;  Location: [facility];  Service:     ESOPHAGOGASTRODUODENOSCOPY N/A 2016    Procedure: ESOPHAGOGASTRODUODENOSCOPY (EGD); Surgeon: Luisa Bonilla MD;  Location: [facility]; Service:     ESOPHAGOGASTRODUODENOSCOPY N/A 2016    Procedure: ESOPHAGOGASTRODUODENOSCOPY (EGD); Surgeon: Luisa Bonilla MD;  Location: [facility]; Service:     NO PAST SURGERIES           Family History     Family History   Problem Relation Age of Onset    Leukemia Brother     Leukemia Mother        Social History     Social History     Tobacco Use   Smoking Status Former Smoker    Packs/day: 1 00    Years: 15 00    Pack years: 15 00    Types: Cigarettes    Start date:     Last attempt to quit: Jorge Abdi Years since quittin 5   ·  He does not use tobacco at this time  Social History     Substance and Sexual Activity   Alcohol Use Yes    Alcohol/week: 6 0 standard drinks    Types: 6 Cans of beer per week    Comment: reduced ~1 week ago from 6-10/day (42+ /wk)   ·  He has recently cut back on his alcohol use  He states that he has approximately 2-4 beers per day and mixes alcoholic beverages with nonalcoholic    Social History Substance and Sexual Activity   Drug Use No     Marital Status: /Civil Union    Medications:  Current Facility-Administered Medications   Medication Dose Route Frequency    atropine 1 mg/10 mL injection **ADS Override Pull**        DOPamine (INTROPIN) 400 mg in 250 mL infusion (premix)  1-20 mcg/kg/min Intravenous Titrated       Medications Prior to Admission     Prior to Admission medications    Medication Sig Start Date End Date Taking? Authorizing Provider   aspirin 81 MG tablet Take 81 mg by mouth daily  Historical Provider, MD   atorvastatin (LIPITOR) 40 mg tablet Take 40 mg by mouth daily  Historical Provider, MD   bisoprolol (ZEBETA) 5 mg tablet Take 2 5 mg by mouth daily  Historical Provider, MD   clopidogrel (PLAVIX) 75 mg tablet Take 75 mg by mouth daily  Historical Provider, MD   CO-ENZYME Q-10 PO Take 1 tablet by mouth daily  Historical Provider, MD   folic acid (FOLVITE) 466 MCG tablet Take 400 mcg by mouth daily  Historical Provider, MD   Omega-3 Fatty Acids (FISH OIL) 1,000 mg Take 1,000 mg by mouth daily  Historical Provider, MD   pantoprazole (PROTONIX) 40 mg tablet Take 40 mg by mouth 2 (two) times a day  Historical Provider, MD   thiamine 100 MG tablet Take 100 mg by mouth 2 (two) times a day  Historical Provider, MD       Allergies   No Known Allergies    Review of Systems   Review of Systems   Constitutional: Positive for activity change  Negative for appetite change, chills, diaphoresis, fatigue, fever and unexpected weight change  HENT: Negative  Eyes: Negative  Respiratory: Negative  Cardiovascular: Negative  Gastrointestinal: Negative  Endocrine: Negative  Genitourinary: Negative  Musculoskeletal: Positive for gait problem  Negative for back pain, joint swelling, myalgias and neck pain  Skin: Negative  Allergic/Immunologic: Positive for environmental allergies  Negative for food allergies and immunocompromised state  Neurological: Positive for dizziness  Hematological: Negative  Psychiatric/Behavioral: Negative  Temperature:   Temp (24hrs), Av 7 °F (36 5 °C), Min:97 7 °F (36 5 °C), Max:97 7 °F (36 5 °C)    Current Temperature: 97 7 °F (36 5 °C)  Vitals:  Vitals:    19 0930 19 1000 19 1030 19 1100   BP: 135/60 159/73 159/67 (!) 187/90   BP Location:       Pulse: 74 91 90 92   Resp: 20 (!) 29 (!) 29 20   Temp:       TempSrc:       SpO2: 97% 96% 95% 92%   Weight:             Weights:   IBW: -88 kg  Body mass index is 36 95 kg/m²  Physical Exam   Physical Exam   Constitutional: He is oriented to person, place, and time  He appears well-developed and well-nourished  No distress  HENT:   Head: Normocephalic and atraumatic  Right Ear: External ear normal    Left Ear: External ear normal    Nose: Nose normal    Mouth/Throat: Oropharynx is clear and moist    Eyes: Pupils are equal, round, and reactive to light  Conjunctivae and EOM are normal  Right eye exhibits no discharge  Left eye exhibits no discharge  No scleral icterus  Neck: Normal range of motion  Neck supple  No JVD present  No tracheal deviation present  No thyromegaly present  Cardiovascular: Normal rate, regular rhythm and intact distal pulses  Exam reveals no gallop and no friction rub  Pulmonary/Chest: Effort normal and breath sounds normal  No stridor  No respiratory distress  He has no wheezes  He has no rales  He exhibits no tenderness  Abdominal: Soft  Bowel sounds are normal  He exhibits no distension and no mass  There is no tenderness  There is no guarding  Musculoskeletal: Normal range of motion  He exhibits no edema, tenderness or deformity  Neurological: He is alert and oriented to person, place, and time  No cranial nerve deficit  Skin: Skin is warm and dry  Capillary refill takes less than 2 seconds  No rash noted  He is not diaphoretic  No erythema  No pallor     Psychiatric: He has a normal mood and affect  His behavior is normal  Judgment and thought content normal        Labs:  Results from last 7 days   Lab Units 07/11/19  0748   WBC Thousand/uL 5 78   HEMOGLOBIN g/dL 14 3   HEMATOCRIT % 43 8   NEUTROS PCT % 70   MONOS PCT % 6      Results from last 7 days   Lab Units 07/11/19  0748   SODIUM mmol/L 137   POTASSIUM mmol/L 3 8   CHLORIDE mmol/L 105   CO2 mmol/L 26   BUN mg/dL 14   CREATININE mg/dL 0 79   CALCIUM mg/dL 8 5   ALK PHOS U/L 181*   ALT U/L 29   AST U/L 24   ALBUMIN g/dL 3 5     Results from last 7 days   Lab Units 07/11/19  0748   MAGNESIUM mg/dL 2 0          Results from last 7 days   Lab Units 07/11/19  0748   INR  1 05   PTT seconds 24         0   Lab Value Date/Time    TROPONINI <0 02 07/11/2019 6637         Imaging:  Chest x-ray reviewed and shows pulmonary congestion  Previous chest x-ray reviewed  I have personally reviewed pertinent films in PACS  EKG:  Episodes of complete heart block noted  This was personally reviewed by myself      ______________________________________________________________________    Assessment and Plan     Principal Problem:    Heart block  Active Problems:    History of recent fall    Fracture of multiple ribs of left side    [address]A (cerebral vascular [address]) ([facility] [address] )    [facility]otid artery stenosis with [facility] )    Hypertension    Hyperlipidemia  Resolved Problems:    * No resolved hospital problems  *        Plan:  Neuro:   · Serial Neuro Exams  · Management of PAD  · Analgesia:  No pain at this time  · Delirium monitoring/management  · Regulate Sleep-wake cycle/CAM-ICU daily  · Daily SAT  CV:   · Cardiac infusions:  Dopamine 5 micrograms/kilogram per hour  · MAP goal > 65  · Rhythm: NSR at the time of examination  · Follow rhythm on telemetry  · Electrophysiology following  Lung:   · Chest x-ray completed emergency department shows pulmonary congestion    GI:   · Stress ulcer prophylaxis: Protonix IV and No prophylaxis needed  · Bowel regimen: None needed at this time  FEN:   · Nutrition/diet plan:  NPO  · Replete electrolytes with goals: K >4 0, Mag >2 0, and Phos >3 0  :   · Indwelling Isabel present: no   · Trend UOP and BUN/creat  · Strict I and O  ID:   · Trend temps and WBC count  Heme:   · Trend hgb and plts  · Transfuse as needed for goal hgb > 7  · On admission INR is 1 05, protime 13 3 and PTT 24  Endo:   · Glycemic control plan: Blood glucose controlled on current regimen  · Goal -180mg/dL  MSK/Skin:  · Early Mobility/Exercise  · PT consult: no  · OT consult: no  · Turn and position patient Q2 hours  · Off load pressure points  · Allevyn preventative per protocol  Family:  · Family updated within 24 hours: yes   VTE Prophylaxis:  · Pharmacologic Prophylaxis: Enoxaparin (Lovenox)  · Mechanical Prophylaxis: sequential compression device    Disposition: Continue ICU care      Counseling / Coordination of Care  Total Critical Care time spent 0 minutes excluding procedures, teaching and family updates  ______________________________________________________________________    Invasive lines and devices: Invasive Devices     Peripheral Intravenous Line            Peripheral IV 07/11/19 Left Wrist less than 1 day    Peripheral IV 07/11/19 Right Forearm less than 1 day                Code Status:  Level 1    Given critical illness, patient length of stay will require greater than two midnights  Portions of the record may have been created with voice recognition software  Occasional wrong word or "sound a like" substitutions may have occurred due to the inherent limitations of voice recognition software  Read the chart carefully and recognize, using context, where substitutions have occurred        Chon Mendenhall PA-C

## 2019-07-12 ENCOUNTER — ANESTHESIA (INPATIENT)
Dept: NON INVASIVE DIAGNOSTICS | Facility: HOSPITAL | Age: 79
DRG: 244 | End: 2019-07-12
Payer: COMMERCIAL

## 2019-07-12 ENCOUNTER — APPOINTMENT (INPATIENT)
Dept: RADIOLOGY | Facility: HOSPITAL | Age: 79
DRG: 244 | End: 2019-07-12
Payer: COMMERCIAL

## 2019-07-12 LAB
ANION GAP SERPL CALCULATED.3IONS-SCNC: 5 MMOL/L (ref 4–13)
ANION GAP SERPL CALCULATED.3IONS-SCNC: 5 MMOL/L (ref 4–13)
BASOPHILS # BLD AUTO: 0.04 THOUSANDS/ΜL (ref 0–0.1)
BASOPHILS NFR BLD AUTO: 1 % (ref 0–1)
BUN SERPL-MCNC: 12 MG/DL (ref 5–25)
BUN SERPL-MCNC: 16 MG/DL (ref 5–25)
CALCIUM SERPL-MCNC: 8.7 MG/DL (ref 8.3–10.1)
CALCIUM SERPL-MCNC: 9.1 MG/DL (ref 8.3–10.1)
CHLORIDE SERPL-SCNC: 101 MMOL/L (ref 100–108)
CHLORIDE SERPL-SCNC: 101 MMOL/L (ref 100–108)
CO2 SERPL-SCNC: 27 MMOL/L (ref 21–32)
CO2 SERPL-SCNC: 29 MMOL/L (ref 21–32)
CREAT SERPL-MCNC: 0.6 MG/DL (ref 0.6–1.3)
CREAT SERPL-MCNC: 0.92 MG/DL (ref 0.6–1.3)
EOSINOPHIL # BLD AUTO: 0.03 THOUSAND/ΜL (ref 0–0.61)
EOSINOPHIL NFR BLD AUTO: 0 % (ref 0–6)
ERYTHROCYTE [DISTWIDTH] IN BLOOD BY AUTOMATED COUNT: 13.2 % (ref 11.6–15.1)
ERYTHROCYTE [DISTWIDTH] IN BLOOD BY AUTOMATED COUNT: 13.3 % (ref 11.6–15.1)
GFR SERPL CREATININE-BSD FRML MDRD: 78 ML/MIN/1.73SQ M
GFR SERPL CREATININE-BSD FRML MDRD: 95 ML/MIN/1.73SQ M
GLUCOSE SERPL-MCNC: 119 MG/DL (ref 65–140)
GLUCOSE SERPL-MCNC: 125 MG/DL (ref 65–140)
HCT VFR BLD AUTO: 45.6 % (ref 36.5–49.3)
HCT VFR BLD AUTO: 46.1 % (ref 36.5–49.3)
HGB BLD-MCNC: 14.9 G/DL (ref 12–17)
HGB BLD-MCNC: 15.4 G/DL (ref 12–17)
IMM GRANULOCYTES # BLD AUTO: 0.02 THOUSAND/UL (ref 0–0.2)
IMM GRANULOCYTES NFR BLD AUTO: 0 % (ref 0–2)
LYMPHOCYTES # BLD AUTO: 1.17 THOUSANDS/ΜL (ref 0.6–4.47)
LYMPHOCYTES NFR BLD AUTO: 15 % (ref 14–44)
MAGNESIUM SERPL-MCNC: 2.1 MG/DL (ref 1.6–2.6)
MCH RBC QN AUTO: 30.6 PG (ref 26.8–34.3)
MCH RBC QN AUTO: 30.6 PG (ref 26.8–34.3)
MCHC RBC AUTO-ENTMCNC: 32.7 G/DL (ref 31.4–37.4)
MCHC RBC AUTO-ENTMCNC: 33.4 G/DL (ref 31.4–37.4)
MCV RBC AUTO: 92 FL (ref 82–98)
MCV RBC AUTO: 94 FL (ref 82–98)
MONOCYTES # BLD AUTO: 0.41 THOUSAND/ΜL (ref 0.17–1.22)
MONOCYTES NFR BLD AUTO: 5 % (ref 4–12)
NEUTROPHILS # BLD AUTO: 6.12 THOUSANDS/ΜL (ref 1.85–7.62)
NEUTS SEG NFR BLD AUTO: 79 % (ref 43–75)
NRBC BLD AUTO-RTO: 0 /100 WBCS
PLATELET # BLD AUTO: 143 THOUSANDS/UL (ref 149–390)
PLATELET # BLD AUTO: 148 THOUSANDS/UL (ref 149–390)
PMV BLD AUTO: 9.7 FL (ref 8.9–12.7)
PMV BLD AUTO: 9.8 FL (ref 8.9–12.7)
POTASSIUM SERPL-SCNC: 3.8 MMOL/L (ref 3.5–5.3)
POTASSIUM SERPL-SCNC: 4.1 MMOL/L (ref 3.5–5.3)
RBC # BLD AUTO: 4.87 MILLION/UL (ref 3.88–5.62)
RBC # BLD AUTO: 5.04 MILLION/UL (ref 3.88–5.62)
SODIUM SERPL-SCNC: 133 MMOL/L (ref 136–145)
SODIUM SERPL-SCNC: 135 MMOL/L (ref 136–145)
WBC # BLD AUTO: 6.85 THOUSAND/UL (ref 4.31–10.16)
WBC # BLD AUTO: 7.79 THOUSAND/UL (ref 4.31–10.16)

## 2019-07-12 PROCEDURE — 33208 INSRT HEART PM ATRIAL & VENT: CPT

## 2019-07-12 PROCEDURE — 85027 COMPLETE CBC AUTOMATED: CPT | Performed by: PHYSICIAN ASSISTANT

## 2019-07-12 PROCEDURE — NC001 PR NO CHARGE: Performed by: INTERNAL MEDICINE

## 2019-07-12 PROCEDURE — C1892 INTRO/SHEATH,FIXED,PEEL-AWAY: HCPCS

## 2019-07-12 PROCEDURE — 02H63JZ INSERTION OF PACEMAKER LEAD INTO RIGHT ATRIUM, PERCUTANEOUS APPROACH: ICD-10-PCS | Performed by: INTERNAL MEDICINE

## 2019-07-12 PROCEDURE — 02HK3JZ INSERTION OF PACEMAKER LEAD INTO RIGHT VENTRICLE, PERCUTANEOUS APPROACH: ICD-10-PCS | Performed by: INTERNAL MEDICINE

## 2019-07-12 PROCEDURE — 02K83ZZ MAP CONDUCTION MECHANISM, PERCUTANEOUS APPROACH: ICD-10-PCS | Performed by: INTERNAL MEDICINE

## 2019-07-12 PROCEDURE — 83735 ASSAY OF MAGNESIUM: CPT | Performed by: PHYSICIAN ASSISTANT

## 2019-07-12 PROCEDURE — C1785 PMKR, DUAL, RATE-RESP: HCPCS

## 2019-07-12 PROCEDURE — 93321 DOPPLER ECHO F-UP/LMTD STD: CPT | Performed by: INTERNAL MEDICINE

## 2019-07-12 PROCEDURE — C1769 GUIDE WIRE: HCPCS

## 2019-07-12 PROCEDURE — 93308 TTE F-UP OR LMTD: CPT | Performed by: INTERNAL MEDICINE

## 2019-07-12 PROCEDURE — 93325 DOPPLER ECHO COLOR FLOW MAPG: CPT | Performed by: INTERNAL MEDICINE

## 2019-07-12 PROCEDURE — 80048 BASIC METABOLIC PNL TOTAL CA: CPT | Performed by: PHYSICIAN ASSISTANT

## 2019-07-12 PROCEDURE — 99233 SBSQ HOSP IP/OBS HIGH 50: CPT | Performed by: ANESTHESIOLOGY

## 2019-07-12 PROCEDURE — NC001 PR NO CHARGE: Performed by: ANESTHESIOLOGY

## 2019-07-12 PROCEDURE — C1898 LEAD, PMKR, OTHER THAN TRANS: HCPCS

## 2019-07-12 PROCEDURE — 85025 COMPLETE CBC W/AUTO DIFF WBC: CPT | Performed by: PHYSICIAN ASSISTANT

## 2019-07-12 PROCEDURE — 0JH636Z INSERTION OF PACEMAKER, DUAL CHAMBER INTO CHEST SUBCUTANEOUS TISSUE AND FASCIA, PERCUTANEOUS APPROACH: ICD-10-PCS | Performed by: INTERNAL MEDICINE

## 2019-07-12 PROCEDURE — 71045 X-RAY EXAM CHEST 1 VIEW: CPT

## 2019-07-12 PROCEDURE — 33208 INSRT HEART PM ATRIAL & VENT: CPT | Performed by: INTERNAL MEDICINE

## 2019-07-12 RX ORDER — CLOPIDOGREL BISULFATE 75 MG/1
75 TABLET ORAL DAILY
Status: DISCONTINUED | OUTPATIENT
Start: 2019-07-13 | End: 2019-07-13 | Stop reason: HOSPADM

## 2019-07-12 RX ORDER — SODIUM CHLORIDE 9 MG/ML
INJECTION, SOLUTION INTRAVENOUS CONTINUOUS PRN
Status: DISCONTINUED | OUTPATIENT
Start: 2019-07-12 | End: 2019-07-12 | Stop reason: SURG

## 2019-07-12 RX ORDER — CEFAZOLIN SODIUM 2 G/50ML
SOLUTION INTRAVENOUS AS NEEDED
Status: DISCONTINUED | OUTPATIENT
Start: 2019-07-12 | End: 2019-07-12 | Stop reason: SURG

## 2019-07-12 RX ORDER — ATORVASTATIN CALCIUM 40 MG/1
40 TABLET, FILM COATED ORAL
Status: DISCONTINUED | OUTPATIENT
Start: 2019-07-12 | End: 2019-07-13 | Stop reason: HOSPADM

## 2019-07-12 RX ORDER — PROPOFOL 10 MG/ML
INJECTION, EMULSION INTRAVENOUS CONTINUOUS PRN
Status: DISCONTINUED | OUTPATIENT
Start: 2019-07-12 | End: 2019-07-12 | Stop reason: SURG

## 2019-07-12 RX ORDER — GENTAMICIN SULFATE 40 MG/ML
INJECTION, SOLUTION INTRAMUSCULAR; INTRAVENOUS CODE/TRAUMA/SEDATION MEDICATION
Status: COMPLETED | OUTPATIENT
Start: 2019-07-12 | End: 2019-07-12

## 2019-07-12 RX ORDER — LIDOCAINE HYDROCHLORIDE 10 MG/ML
INJECTION, SOLUTION INFILTRATION; PERINEURAL CODE/TRAUMA/SEDATION MEDICATION
Status: COMPLETED | OUTPATIENT
Start: 2019-07-12 | End: 2019-07-12

## 2019-07-12 RX ORDER — ATORVASTATIN CALCIUM 10 MG/1
10 TABLET, FILM COATED ORAL
Status: DISCONTINUED | OUTPATIENT
Start: 2019-07-12 | End: 2019-07-12

## 2019-07-12 RX ORDER — FENTANYL CITRATE 50 UG/ML
INJECTION, SOLUTION INTRAMUSCULAR; INTRAVENOUS AS NEEDED
Status: DISCONTINUED | OUTPATIENT
Start: 2019-07-12 | End: 2019-07-12 | Stop reason: SURG

## 2019-07-12 RX ADMIN — THIAMINE HCL TAB 100 MG 100 MG: 100 TAB at 08:02

## 2019-07-12 RX ADMIN — SODIUM CHLORIDE: 0.9 INJECTION, SOLUTION INTRAVENOUS at 09:37

## 2019-07-12 RX ADMIN — GENTAMICIN SULFATE 80 MG: 40 INJECTION, SOLUTION INTRAMUSCULAR; INTRAVENOUS at 11:17

## 2019-07-12 RX ADMIN — ACETAMINOPHEN 650 MG: 325 TABLET ORAL at 00:20

## 2019-07-12 RX ADMIN — DOPAMINE HYDROCHLORIDE IN DEXTROSE 5 MCG/KG/MIN: 1.6 INJECTION, SOLUTION INTRAVENOUS at 07:11

## 2019-07-12 RX ADMIN — IOHEXOL 20 ML: 350 INJECTION, SOLUTION INTRAVENOUS at 10:26

## 2019-07-12 RX ADMIN — CEFAZOLIN SODIUM 2000 MG: 2 SOLUTION INTRAVENOUS at 09:45

## 2019-07-12 RX ADMIN — THIAMINE HCL TAB 100 MG 100 MG: 100 TAB at 17:17

## 2019-07-12 RX ADMIN — FENTANYL CITRATE 25 MCG: 50 INJECTION, SOLUTION INTRAMUSCULAR; INTRAVENOUS at 09:55

## 2019-07-12 RX ADMIN — Medication 1000 MG: at 08:01

## 2019-07-12 RX ADMIN — ASPIRIN 81 MG 81 MG: 81 TABLET ORAL at 08:01

## 2019-07-12 RX ADMIN — LIDOCAINE HYDROCHLORIDE 20 ML: 10 INJECTION, SOLUTION INFILTRATION; PERINEURAL at 10:23

## 2019-07-12 RX ADMIN — FOLIC ACID TAB 400 MCG 400 MCG: 400 TAB at 08:02

## 2019-07-12 RX ADMIN — PROPOFOL 80 MCG/KG/MIN: 10 INJECTION, EMULSION INTRAVENOUS at 09:55

## 2019-07-12 RX ADMIN — ATORVASTATIN CALCIUM 40 MG: 40 TABLET, FILM COATED ORAL at 17:17

## 2019-07-12 NOTE — ASSESSMENT & PLAN NOTE
History of left MCA stroke 2016 secondary to left carotid thrombus status post endarterectomy in 2016  Continue aspirin

## 2019-07-12 NOTE — ANESTHESIA POSTPROCEDURE EVALUATION
Post-Op Assessment Note    CV Status:  Stable    Pain management: adequate     Mental Status:  Alert and awake   Hydration Status:  Euvolemic   PONV Controlled:  Controlled   Airway Patency:  Patent   Post Op Vitals Reviewed: Yes      Staff: CRNA           BP   120/78   Temp     Pulse  84   Resp   18   SpO2   99

## 2019-07-12 NOTE — ASSESSMENT & PLAN NOTE
Left lateral 5th and 6th rib fractures secondary to fall from recent dizziness episodes    Continue PRN tylenol

## 2019-07-12 NOTE — ASSESSMENT & PLAN NOTE
Paroxysmal high-grade AV block with syncopal episodes  Evaluated by EP with recommendation for dual-chamber pacemaker placed today  Post-ppm chest x-ray to be performed tomorrow a m    Will continue to monitor on telemetry

## 2019-07-12 NOTE — ANESTHESIA PREPROCEDURE EVALUATION
Review of Systems/Medical History  Patient summary reviewed  Chart reviewed      Cardiovascular  EKG reviewed, Exercise tolerance (METS): <4,  Hyperlipidemia, Hypertension controlled, Valvular heart disease , mitral regurgitation, No CAD , Dysrhythmias , 2nd degree block,   Comment: LEFT VENTRICLE:  Systolic function was normal  Ejection fraction was estimated to be 56 %  There were no regional wall motion abnormalities  Wall thickness was moderately increased  There was moderate concentric hypertrophy  Features were consistent with a pseudonormal left ventricular filling pattern, with concomitant abnormal relaxation and increased filling pressure (grade 2 diastolic dysfunction)  Sinus rhythm with 2nd degree A-V block (Mobitz II)  Septal infarct , age undetermined,  Pulmonary  Negative pulmonary ROS        GI/Hepatic  Negative GI/hepatic ROS          Negative  ROS        Endo/Other  Negative endo/other ROS      GYN  Negative gynecology ROS          Hematology  Negative hematology ROS Anemia chronic blood loss anemia,    Comment: Gastric ulcer resolved Musculoskeletal  Negative musculoskeletal ROS        Neurology    TIA, No CVA ,   Comment: Carotid endarterectomy 2017 Psychology   Negative psychology ROS              Physical Exam    Airway    Mallampati score: II  TM Distance: >3 FB  Neck ROM: full     Dental       Cardiovascular      Pulmonary  Breath sounds clear to auscultation,     Other Findings        Anesthesia Plan  ASA Score- 3     Anesthesia Type- IV sedation with anesthesia with ASA Monitors  Additional Monitors:   Airway Plan:         Plan Factors-    Induction- intravenous  Postoperative Plan- Plan for postoperative opioid use  Informed Consent- Anesthetic plan and risks discussed with patient

## 2019-07-12 NOTE — PROGRESS NOTES
Progress Note - Critical Care   Ben Santana [de-identified] y o  male MRN: [id number]  Unit/Bed#: [facility] 513-01 Encounter: [id number]    Assessment:   Principal Problem:    Heart block  Active Problems:    History of recent fall    Fracture of multiple ribs of left side    CVA (cerebral vascular accident) ([facility] [address] )    Carotid artery stenosis with cerebral infarction ([facility] )    Hypertension    Hyperlipidemia  Resolved Problems:    * No resolved hospital problems  *      Plan  Neuro:   · PAD  · Pain controlled with:  Tylenol as needed  · Delirium Precautions  · CAM ICU per protocol  · Regulate sleep/wake cycle+  · Trend neuro exam  · CIWA protocol  CV:   · Hemodynamic infusions:  Dopamine per Cardiology  · MAP goal > 65  · Rhythm: NSR  · Follow rhythm on telemetry  · Pacemaker placement today, 07/12/2019  Lung:   · Pulmonary hygiene    GI:   · Stress ulcer prophylaxis: Not Indicated  · Bowel regimen: None currently    FEN:   · Goal 24 hour fluid balance: Net negative  Currently -3 L net  Fluid/Diuretic plan: Hold diuretics, auto diuresing  · Nutrition/diet plan: May resume diet after pacemaker placement  · Replete electrolytes with goals: K >4 0, Mag >2 0, and Phos >3 0  :   · Indwelling Isabel present: no   · Trend UOP and BUN/creat  · Strict I and O    ID:   · Trend temps and WBC count    Heme:   · Trend hgb and plts  · Transfuse as needed for goal hgb >7 0      Endo:   · Glycemic control plan: Blood glucose controlled on current regimen    MSK/Skin:  · Mobility goal: No OOB until after PPM  Resume activity per cardiology after PPM  · PT consult: not applicable  · OT consult: not applicable  · Frequent turning and pressure off-loading     VTE Prophylaxis:  · Pharmacologic Prophylaxis:Enoxaparin (Lovenox)  · Mechanical Prophylaxis: sequential compression device    Disposition: Transfer to telemetry after pacemaker placement       ______________________________________________________________________  Chief Complaint:  William Hardy could not sleep      HPI/24hr events:  No significant events overnight  Patient stated that he has slight headache last night which Tylenol is effective in treating  He was having trouble sleeping because of being to cold and to hot  He is scheduled for pacemaker placement today  Review of Systems   Constitutional: Negative  HENT: Negative  Eyes: Negative  Respiratory: Negative  Cardiovascular: Negative for chest pain, palpitations and leg swelling  Musculoskeletal: Negative  Allergic/Immunologic: Positive for environmental allergies  Negative for food allergies and immunocompromised state  Neurological: Positive for dizziness and headaches  Negative for speech difficulty, weakness and numbness  Hematological: Negative  Psychiatric/Behavioral: Negative       ______________________________________________________________________  Temperature:   Temp (24hrs), Av 1 °F (36 7 °C), Min:97 7 °F (36 5 °C), Max:98 6 °F (37 °C)    Current Temperature: 98 °F (36 7 °C)    Vitals:    19 0400 19 0500 19 0531 19 0600   BP: 149/75 163/80  150/65   Pulse: 72 80  66   Resp: 21 (!) 23  19   Temp:       TempSrc:       SpO2: 95% 94%  92%   Weight:   110 kg (241 lb 13 5 oz)    Height:                  Weights:   IBW: 73 kg    Body mass index is 34 7 kg/m²  Weight (last 2 days)     Date/Time   Weight    19 0531   110 (241 84)    19 0914   117 (257 5)    19 0732   109 (240)            Height: 5' 10" (177 8 cm)  Hemodynamic Monitoring:  N/A                     No results found for: PHART, BGB4JIH, PO2ART, MQJ6VLI, S2WNQNYL, BEART, SOURCE  SpO2: SpO2: 92 %  ______________________________________________________________________  Physical Exam:     Physical Exam   Constitutional: He is oriented to person, place, and time  He appears well-developed and well-nourished  No distress  HENT:   Head: Normocephalic and atraumatic     Right Ear: External ear normal    Left Ear: External ear normal    Nose: Nose normal    Eyes: Pupils are equal, round, and reactive to light  Conjunctivae and EOM are normal  Right eye exhibits no discharge  Left eye exhibits no discharge  No scleral icterus  Neck: Normal range of motion  No JVD present  No tracheal deviation present  No thyromegaly present  Cardiovascular: Normal rate, regular rhythm and intact distal pulses  Exam reveals no gallop and no friction rub  Pulmonary/Chest: Effort normal and breath sounds normal  No stridor  No respiratory distress  He has no wheezes  He exhibits no tenderness  Abdominal: Soft  Bowel sounds are normal  He exhibits no distension and no mass  There is no tenderness  There is no guarding  Musculoskeletal: Normal range of motion  He exhibits no edema, tenderness or deformity  Neurological: He is alert and oriented to person, place, and time  No cranial nerve deficit or sensory deficit  Skin: Skin is warm and dry  Capillary refill takes less than 2 seconds  No rash noted  He is not diaphoretic  No erythema  No pallor  Psychiatric: He has a normal mood and affect  His behavior is normal  Judgment and thought content normal      ______________________________________________________________________  Intake and Outputs:  I/O       07/10 0701 - 07/11 0700 07/11 0701 - 07/12 0700 07/12 0701 - 07/13 0700    P  O   240     I V  (mL/kg)  420 6 (3 8)     Total Intake(mL/kg)  660 6 (6)     Urine (mL/kg/hr)  3500     Emesis/NG output  0     Stool  0     Total Output  3500     Net  -2839 4            Unmeasured Stool Occurrence  0 x         UOP: 146/hour   Nutrition:        Diet Orders   (From admission, onward)            Start     Ordered    07/12/19 0001  Diet NPO  Diet effective midnight     Question Answer Comment   Diet Type NPO    RD to adjust diet per protocol?  Yes        07/11/19 3885          Labs:   Results from last 7 days   Lab Units 07/12/19  0518 07/11/19  0748   WBC Thousand/uL 7 79 5 78 HEMOGLOBIN g/dL 15 4 14 3   HEMATOCRIT % 46 1 43 8   PLATELETS Thousands/uL 148*  --    NEUTROS PCT % 79* 70   MONOS PCT % 5 6    Results from last 7 days   Lab Units 07/11/19  0748   SODIUM mmol/L 137   POTASSIUM mmol/L 3 8   CHLORIDE mmol/L 105   CO2 mmol/L 26   BUN mg/dL 14   CREATININE mg/dL 0 79   CALCIUM mg/dL 8 5   ALK PHOS U/L 181*   ALT U/L 29   AST U/L 24   GLUCOSE RANDOM mg/dL 111     Results from last 7 days   Lab Units 07/11/19  0748   MAGNESIUM mg/dL 2 0          Results from last 7 days   Lab Units 07/11/19  0748   INR  1 05   PTT seconds 24         0   Lab Value Date/Time    TROPONINI <0 02 07/11/2019 0748     Imaging:  Pulmonary vascular congestion on chest x-ray completed 07/11/2019  I have personally reviewed pertinent films in PACS  EKG: I personally viewed the EKG completed on 07/11/2019  Allergies: No Known Allergies  Medications:   Scheduled Meds:  Current Facility-Administered Medications:  acetaminophen 650 mg Oral Q6H PRN Francisca Noe PA-C    aspirin 81 mg Oral Daily Annamarie Lr PA-C    cefazolin 2,000 mg Intravenous Once Kathleen Patterson PA-C    DOPamine in dextrose 5% 1-20 mcg/kg/min Intravenous Titrated Francisca Noe PA-C Last Rate: 5 mcg/kg/min (07/11/19 1906)   fish oil 1,000 mg Oral Daily Annamarie Lr PA-C    folic acid 076 mcg Oral Daily Annamarie Lr PA-C    thiamine 100 mg Oral BID Francisca Noe PA-C      Continuous Infusions:  DOPamine in dextrose 5% 1-20 mcg/kg/min Last Rate: 5 mcg/kg/min (07/11/19 1906)     PRN Meds:    acetaminophen 650 mg Q6H PRN       Invasive lines and devices: Invasive Devices     Peripheral Intravenous Line            Peripheral IV 07/11/19 Right Forearm less than 1 day    Peripheral IV 07/12/19 Dorsal (posterior); Right Hand less than 1 day                   Counseling / Coordination of Care  Total Critical Care time spent 0 minutes excluding procedures, teaching and family updates        Code Status: Level 1 - Full Code    Portions of the record may have been created with voice recognition software  Occasional wrong word or "sound a like" substitutions may have occurred due to the inherent limitations of voice recognition software  Read the chart carefully and recognize, using context, where substitutions have occurred      Kelly Leblanc PA-C

## 2019-07-12 NOTE — PROCEDURES
PPM - His lead in LPF region    History and physical were reviewed  Patient was examined and history was reviewed  No change in patient's condition Since history and physical has been completed        The pre- operative diagnosis:  Paroxysmal AV block  Intermittent complete heart block    Essential hypertension  Hyperlipidemia  History of stroke  Obesity  Alcohol use disorder            Postoperative diagnosis:  Paroxysmal AV block  Intermittent complete heart block    Essential  hypertension  Hyperlipidemia  History of stroke  Obesity  Alcohol use disorder          EKG              Procedure:  Dual chamber PPM  RA lead   RV lead - His lead - with LPF capture         Surgeon: Dangelo [facility]    Assistants -none    Specimens - none    Estimated blood loss- 10 ml    Findings-none    Complications none    Anesthesia-  Anesthesia by anaesthesiologist , local lidocaine by myself      Details of the device                    Description of procedure: The patient was seen before the procedure  The details of the procedure was explained and patient agreed to the same  Appropriate consent was signed  The patient was brought to the electrophysiologic laboratory  Proper time out was done  Sterile dressing and draping was done  Local lidocaine was infiltrated, In the infraclavicular region at the site of device implant  Initial incision was made by a sharp number 10 blade  Thereafter electrocautery and blunt dissection was used to form a prepectoral pocket  Appropriate hemostasis was taken care of      20 mL of radiocontrast, followed by 20 mL of saline, was injected in a peripheral vein on the side of the implant  Under direct visualization, the axillary vein was  Punctured,extra-thoracic  A single guidewire was inserted, and taking all the way to the inferior vena cava to confirm that it is on the right side  We also confirmed by the left anterior oblique view that the wire is in the right side   Thereafter a second access was obtained using the fluoroscopic image of the venogram as a roadmap  Wire was once again taken to the inferior vena cava, and position checked in [place] views To confirm the appropriate position  A 7F sheath passed over first wire  The atrial lead was placed in the ventricle for back up pacing if needed  A 7F sheath was passed over the second wire  A His sheath and lead was passed through the sheath  Mapping of His bundle done  Then went distally and inferiorly and left posterior fascicle (LPF) was mapped   Position of the lead was confirmed in both [place] and [place] views Appropriate sensing and thresholds were noted  Lead was screwed in LPF position with non selective capture  The sheath was slit and removed  Once again the lead was tested for appropriate sensing, impedance and threshold  The lead was tied down to the prepectoral fascia and muscle  The patient was paced from the LPF  for second part of procedure       The atrial lead in the ventricle was pulled back into the atrium  Wth the curved stylet in it, was maneuvered into the right atrial appendage  Appropriate sensing and thresholds were noted  The lead was screwed in  The sheath was removed  The lead was sutured to the pectoral muscle and fascia      The pocket was washed with large amounts of antibiotic saline  Appropriate hemostasis was taken care of  The lead was connected to the generator  The generator and leads were placed inside the pocket  The generator was tied down  Thereafter the device was interrogated and proper sensing and thresholds through the device were confirmed  The pocket was closed in 3 separate layers with intermittent sutures  Dressing was done with Steri-Strips, Telfa and Tegaderm  A modified pressure bandage was applied         Summary of the procedure: The patient came in to the laboratory for dual -chamber device placement   The device has been placed and patient tolerated the procedure well

## 2019-07-12 NOTE — DISCHARGE INSTRUCTIONS
Please refer to post pacemaker implantation discharge instructions and restrictions and your pacemaker booklet/temporary card  Keep incision dry for one week  Do not use lotions/powders/creams on incision  Remove outer bandage 48 hours after implantation  Leave underlying steri-strips in place, they will either fall off on their own or will be removed at 2 week follow up appointment  No overhead reaching/pushing/pulling/lifting greater than 5-10lbs with left arm for one month  Please call the office if you notice redness, swelling, bleeding, or drainage from incision or if you develop fevers  AFTER PACEMAKER CARE:    If you have any questions, please call [phone] to speak with a nurse (8:30am-4pm, or [phone] after hours)  For appointments, please call [phone]  WHAT YOU SHOULD KNOW:   A pacemaker is a small, battery-powered device that is placed under your skin in your upper chest area with wires placed through a vein that lead directly into the heart  It helps regulate your heart rate and prevent your heart from beating too slowly                  AFTER YOU LEAVE:     Medicines:     · Pain medicine: You may need medicine to take away or decrease pain  ¨ Learn how to take your medicine  Ask what medicine and how much you should take  Be sure you know how, when, and how often to take it  Usually Over the counter pain medicine is sufficient to control pain (Acetominophen or Ibuprofen) Ask your doctor if you may take these  If this does not control your pain, narcotic pain killers may be prescribed, please call if you need prescription  ¨ Do not wait until the pain is severe before you take your medicine  Tell caregivers if your pain does not decrease  ¨ Pain medicine can make you dizzy or sleepy  Prevent falls by calling someone when you get out of bed or if you need help  Take your medicine as directed    Call your healthcare provider if you think your medicine is not helping or if you have side effects  Tell him if you are allergic to any medicine  Follow up with your cardiologist after your procedure: You will need a follow-up visit approximately 2 weeks after you leave the hospital  Your cardiologist will check your wound and make sure that your pacemaker is working correctly  Follow the instructions to check your pacemaker: Your cardiologist or primary healthcare provider will check your pacemaker and the battery regularly  He will use a computer to check your pacemaker over the telephone or wireless device which will be given to you  Pacemaker batteries usually last 5 to 10 years  The pacemaker unit will be replaced when the battery gets low  This is a simpler procedure than the original one to implant your pacemaker  Wound care:  Keep your incision dry for one week  Sponge/tub baths are preferred, try to avoid a shower for 7 days  Do not use lotions/powders/creams on incision  Remove outer bandage 48 hours after implantation  Leave underlying steri-strips in place, they will either fall off on their own or will be removed at 2 week follow up appointment  Please call the office if you notice redness, swelling, bleeding, or drainage from incision or if you develop fevers  Activity:   · Arm movement and lifting:  Be careful using the arm on the side of your pacemaker  Do not move your arm for the first 24 hours after your procedure  Do not  lift your arm above your shoulder or lift more than 10 pounds for one month after your procedure  Avoid pushing, pulling, or repetitive arm movements for one month  This helps the leads stay in place and helps your wound heal  Ask your caregiver when you can drive after your procedure  You may move your arm side to side without lifting above your shoulder, and do not need to wear a sling at home     · Typically driving is allowed after 1 week post pacemaker if you are stable, however in some cases it may be longer and you should discuss with your doctor before proceeding  · Sports:  Ask your caregiver when it is okay to play tennis, golf, basketball, or any sport that requires you to lift your arms  Do not play full contact sports, such as football, that could damage your pacemaker  Ask your cardiologist or primary healthcare provider how much and what kinds of physical activity are safe for you  Living with a pacemaker:   · Tell all caregivers you have a pacemaker: This includes surgeons, radiologists, and medical technicians  You may want to wear a medical alert ID bracelet or necklace that states that you have a pacemaker  · Carry your pacemaker ID card: Make sure you receive a pacemaker ID card  Carry it with you at all times  It lists important information about your pacemaker  Show it to airport security if you travel  · Avoid electrical interference:  Avoid welding equipment and other equipment with large magnets or electric fields  These things could interfere with how your pacemaker works  Use your cell phone on the ear opposite from your pacemaker  Do not carry your cell phone in your shirt pocket over your chest      · Some Pacemakers are MRI safe  Ask you doctor if it is safe to proceed with MRI and let the radiologist and staff know you have a pacemaker  · Do not touch the skin around your pacemaker: This can cause damage to the lead wires or move the pacemaker unit from where it should be  Contact your cardiologist or primary healthcare provider if:   · The area around your pacemaker has increasing amount of pain after surgery  The pain should improve over first few days after implantation  · The skin around your stitches has increasing redness, swelling, or has drainage  This may mean that you have an infection  · You have a fever  · You have chills, a cough, and feel weak or achy  These are also signs of infection  · Your feet or ankles are more swollen than your baseline  · Your Heart rate is less than 50 beats per minute     Seek care immediately if:   · Your bandage becomes soaked with blood  · Your pacemaker is swelling rapidly    · Your stitches open up  · You feel your heart suddenly beating very slowly or quickly  · You become too weak or dizzy to stand, or you pass out  · Your arm or leg feels warm, tender, and painful  It may look swollen and red  · You have chest pain that does not go away with rest or medicine  · You feel lightheaded, short of breath, and have chest pain  · You cough up blood  © 2014 [address] is for End User's use only and may not be sold, redistributed or otherwise used for commercial purposes  All illustrations and images included in CareNotes® are the copyrighted property of [employer]  or [employer]  The above information is an  only  It is not intended as medical advice for individual conditions or treatments  Talk to your doctor, nurse or pharmacist before following any medical regimen to see if it is safe and effective for you

## 2019-07-13 ENCOUNTER — APPOINTMENT (INPATIENT)
Dept: RADIOLOGY | Facility: HOSPITAL | Age: 79
DRG: 244 | End: 2019-07-13
Payer: COMMERCIAL

## 2019-07-13 VITALS
RESPIRATION RATE: 20 BRPM | HEIGHT: 70 IN | TEMPERATURE: 98.3 F | HEART RATE: 67 BPM | OXYGEN SATURATION: 97 % | BODY MASS INDEX: 35.13 KG/M2 | SYSTOLIC BLOOD PRESSURE: 153 MMHG | WEIGHT: 245.37 LBS | DIASTOLIC BLOOD PRESSURE: 72 MMHG

## 2019-07-13 PROBLEM — Z95.0 PACEMAKER: Status: ACTIVE | Noted: 2019-07-13

## 2019-07-13 LAB
ANION GAP SERPL CALCULATED.3IONS-SCNC: 8 MMOL/L (ref 4–13)
BASOPHILS # BLD AUTO: 0.05 THOUSANDS/ΜL (ref 0–0.1)
BASOPHILS NFR BLD AUTO: 1 % (ref 0–1)
BUN SERPL-MCNC: 18 MG/DL (ref 5–25)
CALCIUM SERPL-MCNC: 8.5 MG/DL (ref 8.3–10.1)
CHLORIDE SERPL-SCNC: 104 MMOL/L (ref 100–108)
CO2 SERPL-SCNC: 26 MMOL/L (ref 21–32)
CREAT SERPL-MCNC: 0.77 MG/DL (ref 0.6–1.3)
EOSINOPHIL # BLD AUTO: 0.14 THOUSAND/ΜL (ref 0–0.61)
EOSINOPHIL NFR BLD AUTO: 2 % (ref 0–6)
ERYTHROCYTE [DISTWIDTH] IN BLOOD BY AUTOMATED COUNT: 13.6 % (ref 11.6–15.1)
GFR SERPL CREATININE-BSD FRML MDRD: 86 ML/MIN/1.73SQ M
GLUCOSE SERPL-MCNC: 94 MG/DL (ref 65–140)
HCT VFR BLD AUTO: 44.9 % (ref 36.5–49.3)
HGB BLD-MCNC: 14.7 G/DL (ref 12–17)
IMM GRANULOCYTES # BLD AUTO: 0.04 THOUSAND/UL (ref 0–0.2)
IMM GRANULOCYTES NFR BLD AUTO: 1 % (ref 0–2)
LYMPHOCYTES # BLD AUTO: 1.47 THOUSANDS/ΜL (ref 0.6–4.47)
LYMPHOCYTES NFR BLD AUTO: 19 % (ref 14–44)
MAGNESIUM SERPL-MCNC: 2.5 MG/DL (ref 1.6–2.6)
MCH RBC QN AUTO: 30.8 PG (ref 26.8–34.3)
MCHC RBC AUTO-ENTMCNC: 32.7 G/DL (ref 31.4–37.4)
MCV RBC AUTO: 94 FL (ref 82–98)
MONOCYTES # BLD AUTO: 0.64 THOUSAND/ΜL (ref 0.17–1.22)
MONOCYTES NFR BLD AUTO: 8 % (ref 4–12)
NEUTROPHILS # BLD AUTO: 5.37 THOUSANDS/ΜL (ref 1.85–7.62)
NEUTS SEG NFR BLD AUTO: 69 % (ref 43–75)
NRBC BLD AUTO-RTO: 0 /100 WBCS
PLATELET # BLD AUTO: 141 THOUSANDS/UL (ref 149–390)
PMV BLD AUTO: 10 FL (ref 8.9–12.7)
POTASSIUM SERPL-SCNC: 3.6 MMOL/L (ref 3.5–5.3)
RBC # BLD AUTO: 4.78 MILLION/UL (ref 3.88–5.62)
SODIUM SERPL-SCNC: 138 MMOL/L (ref 136–145)
WBC # BLD AUTO: 7.71 THOUSAND/UL (ref 4.31–10.16)

## 2019-07-13 PROCEDURE — 71046 X-RAY EXAM CHEST 2 VIEWS: CPT

## 2019-07-13 PROCEDURE — 85025 COMPLETE CBC W/AUTO DIFF WBC: CPT | Performed by: PHYSICIAN ASSISTANT

## 2019-07-13 PROCEDURE — 83735 ASSAY OF MAGNESIUM: CPT | Performed by: PHYSICIAN ASSISTANT

## 2019-07-13 PROCEDURE — 80048 BASIC METABOLIC PNL TOTAL CA: CPT | Performed by: PHYSICIAN ASSISTANT

## 2019-07-13 PROCEDURE — 99239 HOSP IP/OBS DSCHRG MGMT >30: CPT | Performed by: INTERNAL MEDICINE

## 2019-07-13 PROCEDURE — 99024 POSTOP FOLLOW-UP VISIT: CPT | Performed by: INTERNAL MEDICINE

## 2019-07-13 RX ORDER — LISINOPRIL 5 MG/1
5 TABLET ORAL DAILY
Status: DISCONTINUED | OUTPATIENT
Start: 2019-07-13 | End: 2019-07-13 | Stop reason: HOSPADM

## 2019-07-13 RX ORDER — ATORVASTATIN CALCIUM 40 MG/1
40 TABLET, FILM COATED ORAL DAILY
Qty: 30 TABLET | Refills: 0 | Status: SHIPPED | OUTPATIENT
Start: 2019-07-13

## 2019-07-13 RX ORDER — POTASSIUM CHLORIDE 20 MEQ/1
40 TABLET, EXTENDED RELEASE ORAL ONCE
Status: COMPLETED | OUTPATIENT
Start: 2019-07-13 | End: 2019-07-13

## 2019-07-13 RX ORDER — LISINOPRIL 5 MG/1
5 TABLET ORAL DAILY
Qty: 30 TABLET | Refills: 0 | Status: SHIPPED | OUTPATIENT
Start: 2019-07-14 | End: 2020-01-01 | Stop reason: HOSPADM

## 2019-07-13 RX ORDER — CLOPIDOGREL BISULFATE 75 MG/1
75 TABLET ORAL DAILY
Qty: 30 TABLET | Refills: 0 | Status: SHIPPED | OUTPATIENT
Start: 2019-07-13 | End: 2019-01-01 | Stop reason: ALTCHOICE

## 2019-07-13 RX ADMIN — LISINOPRIL 5 MG: 5 TABLET ORAL at 12:22

## 2019-07-13 RX ADMIN — ASPIRIN 81 MG 81 MG: 81 TABLET ORAL at 08:15

## 2019-07-13 RX ADMIN — THIAMINE HCL TAB 100 MG 100 MG: 100 TAB at 08:15

## 2019-07-13 RX ADMIN — POTASSIUM CHLORIDE 40 MEQ: 1500 TABLET, EXTENDED RELEASE ORAL at 12:22

## 2019-07-13 RX ADMIN — Medication 1000 MG: at 08:15

## 2019-07-13 RX ADMIN — CLOPIDOGREL BISULFATE 75 MG: 75 TABLET ORAL at 08:15

## 2019-07-13 NOTE — PROGRESS NOTES
General Cardiology   Progress Note -  [facility]   Vela Code [de-identified] y o  male MRN: [id number]    Unit/Bed#: [facility] 509-01 Encounter: [id number]    Assessment/ Plan  Paroxysmal high-grade AV block  -Status post MDT dual chamber ppm  -Postprocedure device interrogation with proper sensing and thresholds  -Chest x-ray 7/12/19:  Left chest wall ppm without pneumothorax  Chest x-ray 7/13/19-Postop day # 1: :  Left chest wall ppm, tip of the pacemaker lead lies within the right atrium and right ventricle no pneumothorax seen  -LCW  pacer site covered by CDI dressing, no visible hematoma or drainage on the dressing  -24 hour telemetry review:  NSR, occasional av paced, brief 7 beat run of nonsustained VT this afternoon---asymptomatic, potassium was repleted  -Tentative follow-up with the device clinic and EP provider as an outpatient has been arranged on discharge  Hypertension  -Average /73, last recorded at 153/72, HR 67  -Current BP regimen:  Can start lisinopril 5 mg daily  Hyperlipidemia  -Lipid profile 6/26/19:  Cholesterol 212, triglycerides 79, HDL 66, LDL direct 130  -On atorvastatin 40 mg daily  History of left MCA CVA in 2016  -Carotid artery stenosis status post left endarterectomy in 2016  -On aspirin, Plavix, and statin    Subjective  Review of Systems   Constitution: Negative for chills, fever and malaise/fatigue  HENT: Negative for congestion  Eyes: Negative for visual disturbance  Cardiovascular: Negative for chest pain, dyspnea on exertion, irregular heartbeat, leg swelling, near-syncope, orthopnea, palpitations, paroxysmal nocturnal dyspnea and syncope  Respiratory: Negative for cough and shortness of breath  Musculoskeletal: Negative for arthritis and myalgias  Gastrointestinal: Negative for bloating, abdominal pain, flatus, heartburn, nausea and vomiting  Genitourinary: Negative for dysuria     Neurological: Negative for dizziness, focal weakness, headaches, light-headedness and weakness  All other systems reviewed and are negative  Objective:   Vitals: Blood pressure 153/72, pulse 67, temperature 98 3 °F (36 8 °C), resp  rate 20, height 5' 10" (1 778 m), weight 111 kg (245 lb 6 oz), SpO2 97 %  ,       Body mass index is 35 21 kg/m²  ,     Systolic (99XIA), UYA:160 , Min:136 , JGX:236     Diastolic (32RHC), PN, Min:68, Max:79      Intake/Output Summary (Last 24 hours) at 2019 1151  Last data filed at 2019 0829  Gross per 24 hour   Intake 180 ml   Output 825 ml   Net -645 ml     Weight (last 2 days)     Date/Time   Weight    19 0547   111 (245 37)    19 0531   110 (241 84)    19 0914   117 (257 5)    19 0732   109 (240)            EKG personally reviewed by GERALDINE Ocampo    Physical Exam   Constitutional: He is oriented to person, place, and time  He appears well-developed and well-nourished  No distress  HENT:   Head: Normocephalic and atraumatic  Mouth/Throat: Oropharynx is clear and moist    Eyes: No scleral icterus  Neck: No JVD present  Cardiovascular: Normal rate, regular rhythm, normal heart sounds and intact distal pulses  No murmur heard  Pulmonary/Chest: Effort normal and breath sounds normal  He has no wheezes  He has no rales  Occasional av paced  Brief 7 beat run of nonsustained VT   Abdominal: Soft  Bowel sounds are normal  There is no tenderness  Musculoskeletal: Normal range of motion  He exhibits no edema  Neurological: He is alert and oriented to person, place, and time  Skin: Skin is warm and dry  Capillary refill takes less than 2 seconds  He is not diaphoretic  Left chest wall incision covered by clean dry and intact dressing   Psychiatric: He has a normal mood and affect  Nursing note and vitals reviewed        LABORATORY RESULTS  Results from last 7 days   Lab Units 19  0748   TROPONIN I ng/mL <0 02     CBC with diff:   Results from last 7 days   Lab Units 19  0450 19  1251 07/12/19  0518 07/11/19  0748   WBC Thousand/uL 7 71 6 85 7 79 5 78   HEMOGLOBIN g/dL 14 7 14 9 15 4 14 3   HEMATOCRIT % 44 9 45 6 46 1 43 8   MCV fL 94 94 92 94   PLATELETS Thousands/uL 141* 143* 148*  --    MCH pg 30 8 30 6 30 6 30 6   MCHC g/dL 32 7 32 7 33 4 32 6   RDW % 13 6 13 3 13 2 13 4   MPV fL 10 0 9 8 9 7 10 3   NRBC AUTO /100 WBCs 0  --  0 0       CMP:  Results from last 7 days   Lab Units 07/13/19  0450 07/12/19  1251 07/12/19  0633 07/11/19  0748   POTASSIUM mmol/L 3 6 4 1 3 8 3 8   CHLORIDE mmol/L 104 101 101 105   CO2 mmol/L 26 29 27 26   BUN mg/dL 18 16 12 14   CREATININE mg/dL 0 77 0 92 0 60 0 79   CALCIUM mg/dL 8 5 8 7 9 1 8 5   AST U/L  --   --   --  24   ALT U/L  --   --   --  29   ALK PHOS U/L  --   --   --  181*   EGFR ml/min/1 73sq m 86 78 95 85       BMP:  Results from last 7 days   Lab Units 07/13/19  0450 07/12/19  1251 07/12/19  0633 07/11/19  0748   POTASSIUM mmol/L 3 6 4 1 3 8 3 8   CHLORIDE mmol/L 104 101 101 105   CO2 mmol/L 26 29 27 26   BUN mg/dL 18 16 12 14   CREATININE mg/dL 0 77 0 92 0 60 0 79   CALCIUM mg/dL 8 5 8 7 9 1 8 5       Lab Results   Component Value Date    NTBNP 816 (H) 07/11/2019             Results from last 7 days   Lab Units 07/13/19  0450 07/12/19  0633 07/11/19  0748   MAGNESIUM mg/dL 2 5 2 1 2 0                 Results from last 7 days   Lab Units 07/11/19  0748   TSH 3RD GENERATON uIU/mL 2 210       Results from last 7 days   Lab Units 07/11/19  0748   INR  1 05       Lipid Profile:   No results found for: CHOL  Lab Results   Component Value Date    HDL 66 (H) 06/26/2019    HDL 36 (L) 02/03/2016    HDL 36 (L) 02/03/2016     Lab Results   Component Value Date    LDLCALC 130 (H) 06/26/2019    LDLCALC 82 02/03/2016    LDLCALC 83 02/03/2016     Lab Results   Component Value Date    TRIG 79 06/26/2019    TRIG 73 02/03/2016    TRIG 67 02/03/2016       Cardiac testing:   Results for orders placed during the hospital encounter of 02/02/16   Echo complete with contrast if indicated    Narrative [address]  [address]  [place], [address]  [phone]    Transthoracic Echocardiogram  2D, M-mode, Doppler, and Color Doppler    Study date:  2016    Patient: No Robledo  MR number: [id number]  Account number: [de-identified]  : 1940  Age: 66 years  Gender: Male  Status: Inpatient  Location: Bedside  Height: 70 in  Weight: 218 5 lb  BP: 122/ 59 mmHg    Indications: Evaluate for suspected cardiac source of embolism  Diagnoses: G45 9 - Transient cerebral ischemic attack, unspecified    Sonographer:  TAL Whitney  Primary Physician:  Chris Abel MD  Referring Physician:  Viviana Thorpe MD  Group:  [id number]  Interpreting Physician:  Alissa Thomas MD    IMPRESSIONS:  There was no diagnostic echocardiographic evidence for a cardiac source of  embolism  SUMMARY    PROCEDURE INFORMATION:  Echocardiographic views were limited due to restricted patient mobility and  poor patient compliance  LEFT VENTRICLE:  Systolic function was normal by visual assessment  Ejection fraction was  estimated to be 60 %  There were no regional wall motion abnormalities  Wall thickness was moderately to markedly increased  There was moderate concentric hypertrophy  Doppler parameters were consistent with abnormal left ventricular relaxation  (grade 1 diastolic dysfunction)  MITRAL VALVE:  There was moderate annular calcification  There was mild regurgitation  AORTIC VALVE:  Transaortic velocity was increased due to valvular stenosis  There was mild stenosis  There was mild to moderate regurgitation  Valve mean gradient was 18 95 mmHg  TRICUSPID VALVE:  There was mild to moderate regurgitation  Pulmonary artery systolic pressure was within the normal range  Estimated peak PA pressure was 35 mmHg  PULMONIC VALVE:  Not well visualized  AORTA:  The root exhibited mild dilatation      HISTORY: Consistent with transient ischemic attack or stroke  PRIOR HISTORY:  Risk factors: alcohol abuse  PROCEDURE: The procedure was performed at the bedside  This was a routine  study  The transthoracic approach was used  The study included complete 2D  imaging, M-mode, complete spectral Doppler, and color Doppler  Echocardiographic views were limited due to restricted patient mobility and  poor patient compliance  This was a technically difficult study  LEFT VENTRICLE: Size was normal  Systolic function was normal by visual  assessment  Ejection fraction was estimated to be 60 %  There were no regional  wall motion abnormalities  Wall thickness was moderately to markedly increased  There was moderate concentric hypertrophy  No evidence of apical thrombus  DOPPLER: Doppler parameters were consistent with abnormal left ventricular  relaxation (grade 1 diastolic dysfunction)  RIGHT VENTRICLE: The size was normal  Systolic function was normal  Wall  thickness was normal     LEFT ATRIUM: Size was normal     RIGHT ATRIUM: Size was normal     MITRAL VALVE: There was moderate annular calcification  Valve structure was  normal  There was normal leaflet separation  DOPPLER: The transmitral velocity  was within the normal range  There was no evidence for stenosis  There was mild  regurgitation  AORTIC VALVE: DOPPLER: Transaortic velocity was increased due to valvular  stenosis  There was mild stenosis  There was mild to moderate regurgitation  TRICUSPID VALVE: The valve structure was normal  There was normal leaflet  separation  DOPPLER: The transtricuspid velocity was within the normal range  There was no evidence for stenosis  There was mild to moderate regurgitation  Pulmonary artery systolic pressure was within the normal range  Estimated peak  PA pressure was 35 mmHg  PULMONIC VALVE: Not well visualized  DOPPLER: The transpulmonic velocity was  within the normal range   There was no significant regurgitation  PERICARDIUM: There was no pericardial effusion  The pericardium was normal in  appearance  AORTA: The root exhibited mild dilatation  SYSTEMIC VEINS: IVC: The inferior vena cava was normal in size  MEASUREMENT TABLES    DOPPLER MEASUREMENTS  Aortic valve   (Reference normals)  Peak jules   2 93 cm/s   (--)  Mean jules   2 05 cm/s   (--)  Peak gradient   34 27 mmHg   (--)  Mean gradient   18 95 mmHg   (--)  Valve area   1 5 cm squared   (--)  Valve area, cont   1 5 cm squared   (--)    SYSTEM MEASUREMENT TABLES    2D  %FS: 32 1 %  AV Diam: 3 9 cm  EDV(Teich): 84 9 ml  EF(Teich): 60 6 %  ESV(Teich): 33 5 ml  IVSd: 1 8 cm  LA Area: 22 4 cm2  LA Diam: 3 6 cm  LVEDV MOD A4C: 79 2 ml  LVEF MOD A4C: 66 8 %  LVESV MOD A4C: 26 3 ml  LVIDd: 4 3 cm  LVIDs: 2 9 cm  LVLd A4C: 7 2 cm  LVLs A4C: 6 2 cm  LVOT Diam: 2 2 cm  LVPWd: 1 6 cm  RA Area: 23 7 cm2  RV Diam : 2 9 cm  SI(Teich): 23 7 ml/m2  SV MOD A4C: 52 9 ml  SV(Cube): 56 2 ml  SV(Teich): 51 4 ml    CW  AV Env  Ti: 310 5 ms  AV VTI: 63 6 cm  AV Vmax: 2 9 m/s  AV Vmean: 2 m/s  AV maxP 3 mmHg  AV meanP mmHg  TR Vmax: 2 5 m/s  TR maxP 1 mmHg    PW  HELEN (VTI): 1 7 cm2  HELEN Vmax: 1 7 cm2  E': 0 1 m/s  E/E': 16 4  LVOT Env  Ti: 354 9 ms  LVOT VTI: 27 9 cm  LVOT Vmax: 1 2 m/s  LVOT Vmean: 0 8 m/s  LVOT maxP 2 mmHg  LVOT meanPG: 3 mmHg  MV A Jules: 1 1 m/s  MV Dec Luna: 3 8 m/s2  MV DecT: 259 1 ms  MV E Jules: 1 m/s  MV E/A Ratio: 0 9    [employer] Accredited Echocardiography Laboratory    Prepared and electronically signed by    Betzy Villatoro MD  Signed [id number] 15:58:12       No results found for this or any previous visit  No results found for this or any previous visit  No procedure found  No results found for this or any previous visit        Meds/Allergies   all current active meds have been reviewed, current meds:   Current Facility-Administered Medications   Medication Dose Route Frequency    acetaminophen (TYLENOL) tablet 650 mg  650 mg Oral Q6H PRN    aspirin chewable tablet 81 mg  81 mg Oral Daily    atorvastatin (LIPITOR) tablet 40 mg  40 mg Oral Daily With Dinner    ceFAZolin (ANCEF) IVPB (premix) 2,000 mg  2,000 mg Intravenous Once    clopidogrel (PLAVIX) tablet 75 mg  75 mg Oral Daily    fish oil capsule 1,000 mg  1,000 mg Oral Daily    folic acid (FOLVITE) tablet 400 mcg  400 mcg Oral Daily    thiamine (VITAMIN B1) tablet 100 mg  100 mg Oral BID    and PTA meds:   Prior to Admission Medications   Prescriptions Last Dose Informant Patient Reported? Taking? CO-ENZYME Q-10 PO   Yes No   Sig: Take 1 tablet by mouth daily  Omega-3 Fatty Acids (FISH OIL) 1,000 mg   Yes No   Sig: Take 1,000 mg by mouth daily  aspirin 81 MG tablet   Yes No   Sig: Take 81 mg by mouth daily  atorvastatin (LIPITOR) 40 mg tablet Not Taking at Unknown time  Yes No   Sig: Take 40 mg by mouth daily  bisoprolol (ZEBETA) 5 mg tablet Not Taking at Unknown time  Yes No   Sig: Take 2 5 mg by mouth daily  clopidogrel (PLAVIX) 75 mg tablet Not Taking at Unknown time  Yes No   Sig: Take 75 mg by mouth daily  folic acid (FOLVITE) 931 MCG tablet   Yes No   Sig: Take 400 mcg by mouth daily  pantoprazole (PROTONIX) 40 mg tablet Not Taking at Unknown time  Yes No   Sig: Take 40 mg by mouth 2 (two) times a day  thiamine 100 MG tablet   Yes No   Sig: Take 100 mg by mouth 2 (two) times a day        Facility-Administered Medications: None     Medications Prior to Admission   Medication    aspirin 81 MG tablet    atorvastatin (LIPITOR) 40 mg tablet    bisoprolol (ZEBETA) 5 mg tablet    clopidogrel (PLAVIX) 75 mg tablet    CO-ENZYME L-85 PO    folic acid (FOLVITE) 168 MCG tablet    Omega-3 Fatty Acids (FISH OIL) 1,000 mg    pantoprazole (PROTONIX) 40 mg tablet    thiamine 100 MG tablet        Assessment:  Principal Problem:    Heart block  Active Problems:    Hypertension    CVA (cerebral vascular accident) ([facility] [address] )    Carotid artery stenosis with cerebral infarction ([facility] [address] )    Hyperlipidemia    Fracture of multiple ribs of left side    History of recent fall    Counseling / Coordination of Care  Total floor / unit time spent today 20 minutes  Greater than 50% of total time was spent with the patient and / or family counseling and / or coordination of care  ** Please Note: Dragon 360 Dictation voice to text software may have been used in the creation of this document   **

## 2019-07-13 NOTE — NURSING NOTE
Pt discharged to home with all of belongings and IV's d/c'd  Medication and discharge instructions given and understood by pt and pt's wife at bedside  Prescriptions given to pt at bedside  Pt wheeled down to lobby in wheelchair by PCA

## 2019-07-13 NOTE — ED PROCEDURE NOTE
PROCEDURE  CriticalCare Time  Performed by: Belkys Blair MD  Authorized by: Belkys Blair MD     Critical care provider statement:     Critical care time (minutes):  40    Critical care time was exclusive of:  Separately billable procedures and treating other patients and teaching time    Critical care was necessary to treat or prevent imminent or life-threatening deterioration of the following conditions:  Circulatory failure and cardiac failure    Critical care was time spent personally by me on the following activities:  Blood draw for specimens, obtaining history from patient or surrogate, development of treatment plan with patient or surrogate, discussions with consultants, evaluation of patient's response to treatment, examination of patient, ordering and performing treatments and interventions, ordering and review of laboratory studies, ordering and review of radiographic studies, re-evaluation of patient's condition and review of old charts  Comments:      [de-identified] y/o m requiring telemetry monitoring with pacing pads and dopamine drip given high degree block with prolonged pause         Belkys Blair MD  07/13/19 [id number]

## 2019-07-13 NOTE — PROGRESS NOTES
RN notified Taylor Hartmann with cardiology that pt had an 8 run of [address]   Potassium and lisinopril will be administered per CRNP's orders

## 2019-07-13 NOTE — DISCHARGE SUMMARY
Discharge Summary - [address] Internal Medicine    Patient Information: Rolando Hernandez [de-identified] y o  male MRN: [id number]  Unit/Bed#: [facility] 509-01 Encounter: [id number]    Discharging Physician / Practitioner: Efren Tyler MD  PCP: Lo Quintanilla MD  Admission Date: 7/11/2019  Discharge Date: 07/13/19    Disposition:     Home    Reason for Admission:  Dizziness    Discharge Diagnoses:     Principal Problem:    Heart block  Active Problems:    Hypertension    CVA (cerebral vascular accident) [facility])    Carotid artery stenosis with cerebral infarction ([facility] [address] )    Hyperlipidemia    Fracture of multiple ribs of left side    History of recent fall    [employer] Goodview XT MRI safe dual chamber PM  Resolved Problems:    * No resolved hospital problems  *      Consultations During Hospital Stay:  · Electrophysiology    Procedures Performed:     · Dual chamber permanent pacemaker placement    Significant Findings / Test Results:     · EKG was 2nd degree AV block    Incidental Findings:   · None    Test Results Pending at Discharge (will require follow up): · None     Outpatient Tests Requested:  · None    Complications:  None    Hospital Course:     Rolando Hernandez is a [de-identified] y o  male patient who originally presented to the hospital on 7/11/2019 due to dizziness  Due to symptoms the patient had fallen  He presents to the ED aureus found to have paroxysmal atrial ventricular block  He was placed on dopamine and admitted to intensive care unit  During his hospital course the patient had a permanent pacemaker placement and was transferred out of the intensive care unit  He was observed over night without any complications and was discharged home the following day  Condition at Discharge: stable     Discharge Day Visit / Exam:     Subjective:  Denies any acute complaints    Vitals: Blood Pressure: 153/72 (07/13/19 1130)  Pulse: 67 (07/13/19 1130)  Temperature: 98 3 °F (36 8 °C) (07/13/19 1130)  Temp Source: Oral (07/12/19 2203)  Respirations: 20 (07/13/19 1130)  Height: 5' 10" (177 8 cm) (07/11/19 1200)  Weight - Scale: 111 kg (245 lb 6 oz) (07/13/19 0547)  SpO2: 97 % (07/13/19 1130)  Exam:   Physical Exam   Constitutional: He is oriented to person, place, and time  He appears well-developed and well-nourished  HENT:   Head: Normocephalic and atraumatic  Eyes: EOM are normal  No scleral icterus  Neck: Neck supple  Cardiovascular: Normal rate and regular rhythm  Pulmonary/Chest: Effort normal    Left upper chest dressings are clean and dry   Abdominal: Soft  Neurological: He is alert and oriented to person, place, and time  Skin: Skin is warm and dry  Discussion with Family: family at bedside, plan of discharge    Discharge instructions/Information to patient and family:   See after visit summary for information provided to patient and family  Provisions for Follow-Up Care:  See after visit summary for information related to follow-up care and any pertinent home health orders  Planned Readmission: None     Discharge Statement:  I spent 40 minutes discharging the patient  This time was spent on the day of discharge  I had direct contact with the patient on the day of discharge  Greater than 50% of the total time was spent examining patient, answering all patient questions, arranging and discussing plan of care with patient as well as directly providing post-discharge instructions  Additional time then spent on discharge activities  Discharge Medications:  See after visit summary for reconciled discharge medications provided to patient and family        ** Please Note: This note has been constructed using a voice recognition system **

## 2019-07-26 ENCOUNTER — IN-CLINIC DEVICE VISIT (OUTPATIENT)
Dept: CARDIOLOGY CLINIC | Facility: CLINIC | Age: 79
End: 2019-07-26

## 2019-07-26 DIAGNOSIS — Z95.0 CARDIAC PACEMAKER IN SITU: Primary | ICD-10-CM

## 2019-07-26 PROCEDURE — 99024 POSTOP FOLLOW-UP VISIT: CPT | Performed by: INTERNAL MEDICINE

## 2019-07-26 NOTE — PROGRESS NOTES
Results for orders placed or performed in visit on 07/26/19   Cardiac EP device report    Narrative    WOUND CHECK: INCISION CLEAN AND DRY WITH EDGES APPROXIMATED; WOUND CARE AND RESTRICTIONS REVIEWED WITH PATIENT  DEVICE INTERROGATED IN THE [facility] OFFICE: BATTERY VOLTAGE ADEQUATE  AP 53%  7%  ALL AVAILABLE LEAD PARAMETERS WITHIN NORMAL LIMITS  NO SIGNIFICANT HIGH RATE EPISODES  NO PROGRAMMING CHANGES MADE TO DEVICE PARAMETERS  NORMAL DEVICE FUNCTION   NC

## 2019-08-20 ENCOUNTER — OFFICE VISIT (OUTPATIENT)
Dept: CARDIOLOGY CLINIC | Facility: CLINIC | Age: 79
End: 2019-08-20
Payer: COMMERCIAL

## 2019-08-20 VITALS
BODY MASS INDEX: 35.14 KG/M2 | HEART RATE: 70 BPM | OXYGEN SATURATION: 97 % | HEIGHT: 71 IN | DIASTOLIC BLOOD PRESSURE: 88 MMHG | SYSTOLIC BLOOD PRESSURE: 156 MMHG | WEIGHT: 251 LBS

## 2019-08-20 DIAGNOSIS — I45.9 HEART BLOCK: ICD-10-CM

## 2019-08-20 DIAGNOSIS — Z95.0 PACEMAKER: ICD-10-CM

## 2019-08-20 DIAGNOSIS — E78.2 MIXED HYPERLIPIDEMIA: Chronic | ICD-10-CM

## 2019-08-20 DIAGNOSIS — I10 HYPERTENSION, UNSPECIFIED TYPE: Primary | Chronic | ICD-10-CM

## 2019-08-20 PROBLEM — I35.0 NONRHEUMATIC AORTIC VALVE STENOSIS: Status: ACTIVE | Noted: 2019-08-20

## 2019-08-20 PROCEDURE — 93000 ELECTROCARDIOGRAM COMPLETE: CPT | Performed by: INTERNAL MEDICINE

## 2019-08-20 PROCEDURE — 99214 OFFICE O/P EST MOD 30 MIN: CPT | Performed by: INTERNAL MEDICINE

## 2019-08-20 RX ORDER — UBIDECARENONE 75 MG
50 CAPSULE ORAL DAILY
COMMUNITY

## 2019-08-20 RX ORDER — LUTEIN 10 MG
10 TABLET ORAL DAILY
COMMUNITY

## 2019-08-20 RX ORDER — NICOTINE POLACRILEX 2 MG
1 GUM BUCCAL DAILY
COMMUNITY

## 2019-08-20 RX ORDER — MAGNESIUM 30 MG
30 TABLET ORAL 2 TIMES DAILY
COMMUNITY

## 2019-08-20 RX ORDER — FERROUS SULFATE 325(65) MG
325 TABLET ORAL
COMMUNITY

## 2019-08-20 RX ORDER — RIBOFLAVIN (VITAMIN B2) 100 MG
100 TABLET ORAL DAILY
COMMUNITY

## 2019-08-20 NOTE — PROGRESS NOTES
Cardiology Follow Up    Pilar Steele  1940  [id number]  [facility] [place]  [address]  [address]  [place] [address] [phone]    1  Hypertension, unspecified type  POCT ECG   2  Heart block     3  [employer] [place] XT MRI safe dual chamber PM     4  Mixed hyperlipidemia           Discussion/Summary: All of his assessed cardiac problems are stable  I have reviewed his medications and made no changes  He will need annually echos to follow the progression of his AS  Interval History: He has not had any further dizziness since his DDDR pacemaker was placed  ECHO showed a normal EF with moderate AS  His BP is elevated in the office today  He denies CP, chest pressure, SOB  ECG today shows NSR with prolonged AV conduction  There is atrial tracking with ventricular pacing  He has HTN, hyperlipidemia      Patient Active Problem List   Diagnosis    TIA involving carotid artery    Hypertension    [facility] [address] [facility]    Carotid artery stenosis with cerebral infarction ([facility] [address] )    Heart block    Hyperlipidemia    Fracture of multiple ribs of left side    History of recent fall    Medtronic [place] XT MRI safe dual chamber PM    Nonrheumatic aortic valve stenosis     Past Medical History:   Diagnosis Date    Acute ischemic left MCA stroke ([facility] [address] ) 2/4/2016    Hyperlipidemia     TIA (transient ischemic attack)      Social History     Socioeconomic History    Marital status: /Civil Union     Spouse name: Not on file    Number of children: Not on file    Years of education: Not on file    Highest education level: Not on file   Occupational History    Not on file   Social Needs    Financial resource strain: Not on file    Food insecurity:     Worry: Not on file     Inability: Not on file    Transportation needs:     Medical: Not on file     Non-medical: Not on file   Tobacco Use    Smoking status: Former Smoker     Packs/day: 1 00     Years: 15 00     Pack years: 15 00     Types: Cigarettes     Start date:      Last attempt to quit:      Years since quittin 6    Smokeless tobacco: Never Used   Substance and Sexual Activity    Alcohol use: Yes     Alcohol/week: 6 0 standard drinks     Types: 6 Cans of beer per week     Drinks per session: 1 or 2     Binge frequency: Less than monthly     Comment: reduced ~1 week ago from 6-10/day (42+ /wk)    Drug use: No    Sexual activity: Not on file   Lifestyle    Physical activity:     Days per week: Not on file     Minutes per session: Not on file    Stress: Not on file   Relationships    Social connections:     Talks on phone: Not on file     Gets together: Not on file     Attends Jain service: Not on file     Active member of club or organization: Not on file     Attends meetings of clubs or organizations: Not on file     Relationship status: Not on file    Intimate partner violence:     Fear of current or ex partner: Not on file     Emotionally abused: Not on file     Physically abused: Not on file     Forced sexual activity: Not on file   Other Topics Concern    Not on file   Social History Narrative    Ret   Lives in [address] [place] [address]  High school education  Wife manages finances  Family History   Problem Relation Age of Onset    Leukemia Brother     Leukemia Mother      Past Surgical History:   Procedure Laterality Date    CAROTID ENDARTARECTOMY Left 2/10/2016    Procedure: ENDARTERECTOMY ARTERY CAROTID  with patch graft  introp duplex scan;  Surgeon: Edward Del Cid MD;  Location: [facility];  Service:     ESOPHAGOGASTRODUODENOSCOPY N/A 2016    Procedure: ESOPHAGOGASTRODUODENOSCOPY (EGD); Surgeon: Zeny Lea MD;  Location: [facility]; Service:     ESOPHAGOGASTRODUODENOSCOPY N/A 2016    Procedure: ESOPHAGOGASTRODUODENOSCOPY (EGD);   Surgeon: Zeny Lea MD;  Location: [facility]; Service:        Current Outpatient Medications:     Ascorbic Acid (VITAMIN C) 100 MG tablet, Take 100 mg by mouth daily, Disp: , Rfl:     aspirin 81 MG tablet, Take 81 mg by mouth daily  , Disp: , Rfl:     atorvastatin (LIPITOR) 40 mg tablet, Take 1 tablet (40 mg total) by mouth daily, Disp: 30 tablet, Rfl: 0    Biotin 1 MG CAPS, Take 1 mg by mouth, Disp: , Rfl:     clopidogrel (PLAVIX) 75 mg tablet, Take 1 tablet (75 mg total) by mouth daily, Disp: 30 tablet, Rfl: 0    CO-ENZYME Q-10 PO, Take 1 tablet by mouth daily  , Disp: , Rfl:     cyanocobalamin (VITAMIN B-12) 100 mcg tablet, Take 50 mcg by mouth daily, Disp: , Rfl:     ferrous sulfate 325 (65 Fe) mg tablet, Take 325 mg by mouth daily with breakfast, Disp: , Rfl:     folic acid (FOLVITE) 679 MCG tablet, Take 400 mcg by mouth daily  , Disp: , Rfl:     Garlic 10 MG CAPS, Take 10 mg by mouth, Disp: , Rfl:     Grape Seed 100 MG CAPS, Take 100 mg by mouth, Disp: , Rfl:     levOCARNitine (L-CARNITINE) 250 MG CAPS, Take 250 mg by mouth, Disp: , Rfl:     lisinopril (ZESTRIL) 5 mg tablet, Take 1 tablet (5 mg total) by mouth daily, Disp: 30 tablet, Rfl: 0    Lutein 10 MG TABS, Take 10 mg by mouth, Disp: , Rfl:     magnesium 30 MG tablet, Take 30 mg by mouth 2 (two) times a day, Disp: , Rfl:     milk thistle 175 MG tablet, Take 175 mg by mouth daily, Disp: , Rfl:     Multiple Vitamins-Minerals (ONE-A-DAY MENS 50+ ADVANTAGE PO), Take by mouth, Disp: , Rfl:     Omega-3 Fatty Acids (FISH OIL) 1,000 mg, Take 1,000 mg by mouth daily  , Disp: , Rfl:     thiamine 100 MG tablet, Take 100 mg by mouth 2 (two) times a day , Disp: , Rfl:   No Known Allergies  Vitals:    08/20/19 1329   BP: 156/88   BP Location: Left arm   Patient Position: Sitting   Cuff Size: Large   Pulse: 70   SpO2: 97%   Weight: 114 kg (251 lb)   Height: 5' 11" (1 803 m)     Weight (last 2 days)     Date/Time   Weight    08/20/19 1329   114 (251)             Blood pressure 156/88, pulse 70, height 5' 11" (1 803 m), weight 114 kg (251 lb), SpO2 97 %  , Body mass index is 35 01 kg/m²      Labs:  Admission on 07/11/2019, Discharged on 07/13/2019   Component Date Value    WBC 07/11/2019 5 78     RBC 07/11/2019 4 67     Hemoglobin 07/11/2019 14 3     Hematocrit 07/11/2019 43 8     MCV 07/11/2019 94     MCH 07/11/2019 30 6     MCHC 07/11/2019 32 6     RDW 07/11/2019 13 4     MPV 07/11/2019 10 3     Platelets 89/43/2929      nRBC 07/11/2019 0     Neutrophils Relative 07/11/2019 70     Immat GRANS % 07/11/2019 0     Lymphocytes Relative 07/11/2019 21     Monocytes Relative 07/11/2019 6     Eosinophils Relative 07/11/2019 2     Basophils Relative 07/11/2019 1     Neutrophils Absolute 07/11/2019 4 03     Immature Grans Absolute 07/11/2019 0 02     Lymphocytes Absolute 07/11/2019 1 23     Monocytes Absolute 07/11/2019 0 32     Eosinophils Absolute 07/11/2019 0 14     Basophils Absolute 07/11/2019 0 04     Protime 07/11/2019 13 3     INR 07/11/2019 1 05     PTT 07/11/2019 24     Sodium 07/11/2019 137     Potassium 07/11/2019 3 8     Chloride 07/11/2019 105     CO2 07/11/2019 26     ANION GAP 07/11/2019 6     BUN 07/11/2019 14     Creatinine 07/11/2019 0 79     Glucose 07/11/2019 111     Calcium 07/11/2019 8 5     AST 07/11/2019 24     ALT 07/11/2019 29     Alkaline Phosphatase 07/11/2019 181*    Total Protein 07/11/2019 6 8     Albumin 07/11/2019 3 5     Total Bilirubin 07/11/2019 0 70     eGFR 07/11/2019 85     Magnesium 07/11/2019 2 0     Troponin I 07/11/2019 <0 02     NT-proBNP 07/11/2019 816*    TSH 3RD GENERATON 07/11/2019 2 210     Ventricular Rate 07/11/2019 61     Atrial Rate 07/11/2019 61     LA Interval 07/11/2019 232     QRSD Interval 07/11/2019 94     QT Interval 07/11/2019 428     QTC Interval 07/11/2019 430     P Axis 07/11/2019 140     QRS Axis 07/11/2019 46     T Wave Axis 07/11/2019 75     Ventricular Rate 07/11/2019 57     Atrial Rate 07/11/2019 136     QRSD Interval 07/11/2019 90     QT Interval 07/11/2019 406     QTC Interval 07/11/2019 395     P Axis 07/11/2019 85     QRS Axis 07/11/2019 44     T Wave Axis 07/11/2019 78     WBC 07/12/2019 7 79     RBC 07/12/2019 5 04     Hemoglobin 07/12/2019 15 4     Hematocrit 07/12/2019 46 1     MCV 07/12/2019 92     MCH 07/12/2019 30 6     MCHC 07/12/2019 33 4     RDW 07/12/2019 13 2     MPV 07/12/2019 9 7     Platelets 59/48/0393 148*    nRBC 07/12/2019 0     Neutrophils Relative 07/12/2019 79*    Immat GRANS % 07/12/2019 0     Lymphocytes Relative 07/12/2019 15     Monocytes Relative 07/12/2019 5     Eosinophils Relative 07/12/2019 0     Basophils Relative 07/12/2019 1     Neutrophils Absolute 07/12/2019 6 12     Immature Grans Absolute 07/12/2019 0 02     Lymphocytes Absolute 07/12/2019 1 17     Monocytes Absolute 07/12/2019 0 41     Eosinophils Absolute 07/12/2019 0 03     Basophils Absolute 07/12/2019 0 04     Magnesium 07/12/2019 2 1     Sodium 07/12/2019 133*    Potassium 07/12/2019 3 8     Chloride 07/12/2019 101     CO2 07/12/2019 27     ANION GAP 07/12/2019 5     BUN 07/12/2019 12     Creatinine 07/12/2019 0 60     Glucose 07/12/2019 119     Calcium 07/12/2019 9 1     eGFR 07/12/2019 95     WBC 07/12/2019 6 85     RBC 07/12/2019 4 87     Hemoglobin 07/12/2019 14 9     Hematocrit 07/12/2019 45 6     MCV 07/12/2019 94     MCH 07/12/2019 30 6     MCHC 07/12/2019 32 7     RDW 07/12/2019 13 3     Platelets 13/71/7264 143*    MPV 07/12/2019 9 8     Sodium 07/12/2019 135*    Potassium 07/12/2019 4 1     Chloride 07/12/2019 101     CO2 07/12/2019 29     ANION GAP 07/12/2019 5     BUN 07/12/2019 16     Creatinine 07/12/2019 0 92     Glucose 07/12/2019 125     Calcium 07/12/2019 8 7     eGFR 07/12/2019 78     WBC 07/13/2019 7 71     RBC 07/13/2019 4 78     Hemoglobin 07/13/2019 14 7     Hematocrit 07/13/2019 44 9     MCV 07/13/2019 94     MCH 07/13/2019 30 8     MCHC 07/13/2019 32 7     RDW 07/13/2019 13 6     MPV 07/13/2019 10 0     Platelets 05/57/1374 141*    nRBC 07/13/2019 0     Neutrophils Relative 07/13/2019 69     Immat GRANS % 07/13/2019 1     Lymphocytes Relative 07/13/2019 19     Monocytes Relative 07/13/2019 8     Eosinophils Relative 07/13/2019 2     Basophils Relative 07/13/2019 1     Neutrophils Absolute 07/13/2019 5 37     Immature Grans Absolute 07/13/2019 0 04     Lymphocytes Absolute 07/13/2019 1 47     Monocytes Absolute 07/13/2019 0 64     Eosinophils Absolute 07/13/2019 0 14     Basophils Absolute 07/13/2019 0 05     Sodium 07/13/2019 138     Potassium 07/13/2019 3 6     Chloride 07/13/2019 104     CO2 07/13/2019 26     ANION GAP 07/13/2019 8     BUN 07/13/2019 18     Creatinine 07/13/2019 0 77     Glucose 07/13/2019 94     Calcium 07/13/2019 8 5     eGFR 07/13/2019 86     Magnesium 07/13/2019 2 5    Appointment on 06/26/2019   Component Date Value    WBC 06/26/2019 7 95     RBC 06/26/2019 4 76     Hemoglobin 06/26/2019 14 7     Hematocrit 06/26/2019 46 3     MCV 06/26/2019 97     MCH 06/26/2019 30 9     MCHC 06/26/2019 31 7     RDW 06/26/2019 13 9     MPV 06/26/2019 12 0     Platelets 79/44/5559      nRBC 06/26/2019 0     Neutrophils Relative 06/26/2019 67     Immat GRANS % 06/26/2019 1     Lymphocytes Relative 06/26/2019 21     Monocytes Relative 06/26/2019 8     Eosinophils Relative 06/26/2019 2     Basophils Relative 06/26/2019 1     Neutrophils Absolute 06/26/2019 5 46     Immature Grans Absolute 06/26/2019 0 04     Lymphocytes Absolute 06/26/2019 1 64     Monocytes Absolute 06/26/2019 0 64     Eosinophils Absolute 06/26/2019 0 12     Basophils Absolute 06/26/2019 0 05     Cholesterol 06/26/2019 212*    Triglycerides 06/26/2019 79     HDL, Direct 06/26/2019 66*    LDL Calculated 06/26/2019 130*    Sodium 06/26/2019 138     Potassium 06/26/2019 4 0     Chloride 06/26/2019 105     CO2 06/26/2019 28     ANION GAP 06/26/2019 5     BUN 06/26/2019 25     Creatinine 06/26/2019 0 85     Glucose, Fasting 06/26/2019 120*    Calcium 06/26/2019 8 6     AST 06/26/2019 22     ALT 06/26/2019 27     Alkaline Phosphatase 06/26/2019 84     Total Protein 06/26/2019 7 1     Albumin 06/26/2019 3 9     Total Bilirubin 06/26/2019 2 09*    eGFR 06/26/2019 82      Imaging: No results found  Review of Systems:  Review of Systems   Constitutional: Negative for diaphoresis, fatigue, fever and unexpected weight change  HENT: Negative  Respiratory: Negative for cough, shortness of breath and wheezing  Cardiovascular: Negative for chest pain, palpitations and leg swelling  Gastrointestinal: Negative for abdominal pain, diarrhea and nausea  Musculoskeletal: Negative for gait problem and myalgias  Skin: Negative for rash  Neurological: Negative for dizziness and numbness  Psychiatric/Behavioral: Negative  Physical Exam:  Physical Exam   Constitutional: He is oriented to person, place, and time  He appears well-developed and well-nourished  HENT:   Head: Normocephalic and atraumatic  Eyes: Pupils are equal, round, and reactive to light  Neck: Normal range of motion  Neck supple  No JVD present  Cardiovascular: Regular rhythm, S1 normal, S2 normal and normal pulses  Murmur heard  Systolic murmur is present with a grade of 4/6  Pulses:       Carotid pulses are 2+ on the right side, and 2+ on the left side  Pulmonary/Chest: Effort normal and breath sounds normal  He has no wheezes  He has no rales  Abdominal: Soft  Bowel sounds are normal  There is no tenderness  Musculoskeletal: Normal range of motion  He exhibits no edema or tenderness  Neurological: He is alert and oriented to person, place, and time  He has normal reflexes  No cranial nerve deficit  Skin: Skin is warm  Psychiatric: He has a normal mood and affect

## 2019-09-18 ENCOUNTER — TELEPHONE (OUTPATIENT)
Dept: CARDIOLOGY CLINIC | Facility: CLINIC | Age: 79
End: 2019-09-18

## 2019-09-18 NOTE — TELEPHONE ENCOUNTER
Patient contacted [facility] location to advise that he had left hand swelling and he is wondering if he should be continuing to take the plavix as prescribed  He states he had a PM placed 7/12/19 and is just concerned the plavix is contributing to swelling  Please advise? Thank you!

## 2019-09-23 ENCOUNTER — TELEPHONE (OUTPATIENT)
Dept: CARDIOLOGY CLINIC | Facility: CLINIC | Age: 79
End: 2019-09-23

## 2019-09-23 NOTE — TELEPHONE ENCOUNTER
Discussed with Therese Ashley (clinical manager @ [facility]) patient has absolutely no cardiac symptoms    Patient was advised to visit with PCP

## 2019-09-23 NOTE — TELEPHONE ENCOUNTER
Pt called stating for abt 1 wk now his left forearm & back of hand has been swollen  It doesn't hurt but more of a tight feeling  He had a pacer implanted 7/12/19 & wondering if it has anything to do with that  As per RF-EP/PA: Doubtable that it maybe due to implant & advised to contact PCP

## 2019-09-24 NOTE — TELEPHONE ENCOUNTER
Unable to take a picture being that it was a phone call  I did speak w/him again & advised him to contact PCP

## 2019-10-03 NOTE — TELEPHONE ENCOUNTER
Patient's PCP ordered US and patient would like to complete the full course of antibiotics prior to having study

## 2019-10-08 ENCOUNTER — TRANSCRIBE ORDERS (OUTPATIENT)
Dept: ADMINISTRATIVE | Facility: HOSPITAL | Age: 79
End: 2019-10-08

## 2019-10-08 DIAGNOSIS — I82.629 DEEP VEIN THROMBOSIS (DVT) OF BRACHIAL VEIN, UNSPECIFIED CHRONICITY, UNSPECIFIED LATERALITY (HCC): Primary | ICD-10-CM

## 2019-10-17 ENCOUNTER — HOSPITAL ENCOUNTER (OUTPATIENT)
Dept: ULTRASOUND IMAGING | Facility: HOSPITAL | Age: 79
Discharge: HOME | End: 2019-10-17
Payer: COMMERCIAL

## 2019-10-17 ENCOUNTER — IN-CLINIC DEVICE VISIT (OUTPATIENT)
Dept: CARDIOLOGY CLINIC | Facility: CLINIC | Age: 79
End: 2019-10-17
Payer: COMMERCIAL

## 2019-10-17 DIAGNOSIS — I82.629 DEEP VEIN THROMBOSIS (DVT) OF BRACHIAL VEIN, UNSPECIFIED CHRONICITY, UNSPECIFIED LATERALITY (HCC): ICD-10-CM

## 2019-10-17 DIAGNOSIS — Z95.0 CARDIAC PACEMAKER IN SITU: Primary | ICD-10-CM

## 2019-10-17 PROCEDURE — 93971 EXTREMITY STUDY: CPT

## 2019-10-17 PROCEDURE — 93971 EXTREMITY STUDY: CPT | Performed by: SURGERY

## 2019-10-17 PROCEDURE — 93280 PM DEVICE PROGR EVAL DUAL: CPT | Performed by: INTERNAL MEDICINE

## 2019-10-17 NOTE — PROGRESS NOTES
MDT DUAL CHAMBER PPM   DEVICE INTERROGATED IN THE [facility] OFFICE: BATTERY VOLTAGE ADEQUATE (9 9 YRS/15 9 YRS AFTER FINAL REPROGRAMMING)  AP 67 5%  93 1% (>40%/AVB)  ALL LEAD PARAMETERS WITHIN NORMAL LIMITS  ALL OTHER TESTING WITHIN NORMAL LIMITS  NO NEW SIGNIFICANT HIGH RATE EPISODES  AHR NOTED WAS PREVIOUSLY REPORTED  DECREASE MADE TO RA & RV AMPLITUDE TO PROMOTE DEVICE LONGEVITY WHILE MAINTAINING AN APPROPRIATE SAFETY MARGIN  MVP PROGRAMMED OFF - PACING MODE CHANGED TO DDDR   PACEMAKER FUNCTIONING APPROPRIATELY    EB

## 2019-12-05 PROBLEM — Z86.73 HISTORY OF CVA (CEREBROVASCULAR ACCIDENT): Status: ACTIVE | Noted: 2019-01-01

## 2019-12-05 PROBLEM — K81.0 ACUTE CHOLECYSTITIS: Status: ACTIVE | Noted: 2019-01-01

## 2019-12-05 PROBLEM — R74.01 TRANSAMINITIS: Status: ACTIVE | Noted: 2019-01-01

## 2019-12-05 PROBLEM — I31.3 PERICARDIAL EFFUSION: Status: ACTIVE | Noted: 2019-01-01

## 2019-12-05 PROBLEM — K80.50 CHOLEDOCHOLITHIASIS: Status: ACTIVE | Noted: 2019-01-01

## 2019-12-05 NOTE — ASSESSMENT & PLAN NOTE
· Feb 2016-- R MCA 2/2 high-grade L carotid artery stenosis-- s/p L CEA  · Currently on DAPT  · Will need to hold plavix prior to ERCP

## 2019-12-05 NOTE — ED NOTES
Made security aware that patient will be leaving car here during admission process: [facility] [employer] [facility] 2016 year in a handicapped hospital parking space  Pt and wife aware  Patient's daughter will be coming from home to  wife to go home  Plan for surgery team to see patient       Malathi Retana RN  12/05/19 1653

## 2019-12-05 NOTE — ASSESSMENT & PLAN NOTE
· Patient currently on lisinopril  · Significantly elevated blood pressures felt to be secondary to pain  · Pain control  · Hydralazine 5 mg IV q 6 p r n   SBP greater than 185

## 2019-12-05 NOTE — ASSESSMENT & PLAN NOTE
· Systolic ejection murmur noted on physical exam  · Will order repeat echo to eval this and pericardial effusion

## 2019-12-05 NOTE — H&P
H&P- Maron Litten 1940, 78 y o  male MRN: [id number]    Unit/Bed#: [facility]ER[facility] Encounter: [id number]    Primary Care Provider: Nicki Ortiz MD   Date and time admitted to hospital: 12/5/2019 11:45 AM    * Acute cholecystitis  Assessment & Plan  · Noted on CT A/P  · D/W Surgery: would not plan for surgical intervention immediately; continue ancef/flagyl; appreciate GI eval     Choledocholithiasis  Assessment & Plan  · Noted on CT A/P; also with cystic duct dilation   · D/W GI: will require ERCP with likely stent placement; will make NPO    Transaminitis  Assessment & Plan  · 2/2 cholecystitis/choledocholithiasis   · Avoid hypotension  · Avoid hepatoxins  · Trend LFTs    Pericardial effusion  Assessment & Plan  · Noted on Echo from July 2019  Unsure how chronic this is or if it has worsened  No recent SOB  · Will get repeat Echo while here     History of CVA (cerebrovascular accident)  Assessment & Plan  · Feb 2016-- R MCA 2/2 high-grade L carotid artery stenosis-- s/p L CEA  · Currently on DAPT  · Will need to hold plavix prior to ERCP    Nonrheumatic aortic valve stenosis  Assessment & Plan  · Systolic ejection murmur noted on physical exam  · Will order repeat echo to eval this and pericardial effusion    Hyperlipidemia  Assessment & Plan  · Hold statin in setting of transaminitis    Hypertension  Assessment & Plan  · Patient currently on lisinopril  · Significantly elevated blood pressures felt to be secondary to pain  · Pain control  · Hydralazine 5 mg IV q 6 p r n  SBP greater than 185    VTE Prophylaxis: Enoxaparin (Lovenox)  / sequential compression device   Code Status: Level 1  POLST: There is no POLST form on file for this patient (pre-hospital)  Discussion with family: wife at bedside     Anticipated Length of Stay:  Patient will be admitted on an Inpatient basis with an anticipated length of stay of  > 2 midnights     Justification for Hospital Stay: management of cholecystitis/choledocho    Total Time for Visit, including Counseling / Coordination of Care: 30 minutes  Greater than 50% of this total time spent on direct patient counseling and coordination of care  Chief Complaint:   Abdominal pain     History of Present Illness:    Silvestre Daniels is a 78 y o  male with past medical history significant for hypertension, hyperlipidemia, history of CVA status post left carotid endarterectomy presents to the ED for 3 day history of abdominal pain  Patient reports pain has been intermittent for the last 3 days  He reports he has not been behaving with his diet, and has been eating poorly  He states for the last few days after eating he would have right upper quadrant and generalized abdominal pain  He reports typically the pain would last for a few minutes and then dissipate spontaneously  However, since last evening his pain is been constant  Denies fevers or chills  Denies nausea or vomiting  Denies prior history of similar symptoms  Does have intermittent constipation, but no diarrhea  Review of Systems:    Review of Systems   Constitutional: Positive for appetite change  HENT: Negative  Eyes: Negative  Respiratory: Negative  Cardiovascular: Negative  Gastrointestinal: Positive for abdominal pain  Genitourinary: Negative  Musculoskeletal: Negative  Skin: Negative  Neurological: Negative  Hematological: Negative  Psychiatric/Behavioral: Negative          Past Medical and Surgical History:     Past Medical History:   Diagnosis Date    Acute ischemic left MCA stroke ([facility] [address] ) 2/4/2016    Aortic stenosis     Gastric ulcer 2017    Hyperlipidemia     Hypertension     TIA (transient ischemic attack)        Past Surgical History:   Procedure Laterality Date    CARDIAC PACEMAKER PLACEMENT      CAROTID ENDARTARECTOMY Left 2/10/2016    Procedure: ENDARTERECTOMY ARTERY CAROTID  with patch graft  introp duplex scan;  Surgeon: Duncan Yanez MD;  Location: [facility]; Service:     ESOPHAGOGASTRODUODENOSCOPY N/A 5/23/2016    Procedure: ESOPHAGOGASTRODUODENOSCOPY (EGD); Surgeon: Zuly Woodson MD;  Location: [facility]; Service:     ESOPHAGOGASTRODUODENOSCOPY N/A 2/8/2016    Procedure: ESOPHAGOGASTRODUODENOSCOPY (EGD); Surgeon: Zuly Woodson MD;  Location: [facility]; Service:        Meds/Allergies:    Prior to Admission medications    Medication Sig Start Date End Date Taking? Authorizing Provider   Ascorbic Acid (VITAMIN C) 100 MG tablet Take 100 mg by mouth daily    Historical Provider, MD   aspirin 81 MG tablet Take 81 mg by mouth daily  Historical Provider, MD   atorvastatin (LIPITOR) 40 mg tablet Take 1 tablet (40 mg total) by mouth daily 7/13/19   Jhoan Mojica MD   Biotin 1 MG CAPS Take 1 mg by mouth    Historical Provider, MD   clopidogrel (PLAVIX) 75 mg tablet Take 1 tablet (75 mg total) by mouth daily 7/13/19   Jhoan Mojica MD   CO-ENZYME Q-10 PO Take 1 tablet by mouth daily  Historical Provider, MD   cyanocobalamin (VITAMIN B-12) 100 mcg tablet Take 50 mcg by mouth daily    Historical Provider, MD   ferrous sulfate 325 (65 Fe) mg tablet Take 325 mg by mouth daily with breakfast    Historical Provider, MD   folic acid (FOLVITE) 658 MCG tablet Take 400 mcg by mouth daily      Historical Provider, MD   Garlic 10 MG CAPS Take 10 mg by mouth    Historical Provider, MD   Grape Seed 100 MG CAPS Take 100 mg by mouth    Historical Provider, MD   levOCARNitine (L-CARNITINE) 250 MG CAPS Take 250 mg by mouth    Historical Provider, MD   lisinopril (ZESTRIL) 5 mg tablet Take 1 tablet (5 mg total) by mouth daily 7/14/19   Jhoan Mojica MD   Lutein 10 MG TABS Take 10 mg by mouth    Historical Provider, MD   magnesium 30 MG tablet Take 30 mg by mouth 2 (two) times a day    Historical Provider, MD   milk thistle 175 MG tablet Take 175 mg by mouth daily    Historical Provider, MD   Multiple Vitamins-Minerals (ONE-A-DAY MENS 50+ ADVANTAGE PO) Take by mouth    Historical Provider, MD   Omega-3 Fatty Acids (FISH OIL) 1,000 mg Take 1,000 mg by mouth daily  Historical Provider, MD   thiamine 100 MG tablet Take 100 mg by mouth 2 (two) times a day  Historical Provider, MD     I have reviewed home medications with patient personally  Allergies: No Known Allergies    Social History:     Marital Status: /Civil Union   Occupation:  Retired  Patient Pre-hospital Living Situation:  Home with wife  Patient Pre-hospital Level of Mobility:  Independent  Patient Pre-hospital Diet Restrictions:  None  Substance Use History:   Social History     Substance and Sexual Activity   Alcohol Use Yes    Alcohol/week: 6 0 standard drinks    Types: 6 Cans of beer per week    Frequency: 2-3 times a week    Drinks per session: 1 or 2    Binge frequency: Never    Comment: reduced ~1 week ago from 6-10/day (42+ /wk)     Social History     Tobacco Use   Smoking Status Former Smoker    Packs/day: 1 00    Years: 15     Pack years: 15     Types: Cigarettes    Start date:     Last attempt to quit:     Years since quittin 9   Smokeless Tobacco Never Used     Social History     Substance and Sexual Activity   Drug Use No       Family History:    Family History   Problem Relation Age of Onset    Leukemia Brother     Leukemia Mother        Physical Exam:     Vitals:   Blood Pressure: 149/68 (19 1515)  Pulse: 74 (19 1515)  Temperature: 98 3 °F (36 8 °C) (19 1150)  Temp Source: Oral (19 1150)  Respirations: 18 (19 1515)  Height: 5' 11" (180 3 cm) (19 1514)  Weight - Scale: 116 kg (255 lb 11 7 oz) (19 1150)  SpO2: 90 % (19 1515)    Physical Exam   Constitutional: He is oriented to person, place, and time  No distress  HENT:   Head: Normocephalic and atraumatic  Eyes: Pupils are equal, round, and reactive to light  EOM are normal    Neck: No JVD present  Cardiovascular: Normal rate and regular rhythm   Exam reveals no gallop and no friction rub  Murmur heard  Pulmonary/Chest: Effort normal and breath sounds normal  No stridor  No respiratory distress  He has no wheezes  Abdominal: Soft  He exhibits distension  There is tenderness (epigastric, RUQ)  Musculoskeletal: Normal range of motion  He exhibits no edema  Neurological: He is alert and oriented to person, place, and time  He displays normal reflexes  No cranial nerve deficit  Coordination normal    Skin: Skin is warm and dry  He is not diaphoretic  Psychiatric: He has a normal mood and affect  His behavior is normal        Additional Data:     Lab Results: I have personally reviewed pertinent reports  Results from last 7 days   Lab Units 12/05/19  1229   WBC Thousand/uL 11 18*   HEMOGLOBIN g/dL 14 3   HEMATOCRIT % 43 0   PLATELETS Thousands/uL 113*   NEUTROS PCT % 91*   LYMPHS PCT % 4*   MONOS PCT % 5   EOS PCT % 0     Results from last 7 days   Lab Units 12/05/19  1229   SODIUM mmol/L 133*   POTASSIUM mmol/L 4 2   CHLORIDE mmol/L 97*   CO2 mmol/L 26   BUN mg/dL 21   CREATININE mg/dL 0 92   ANION GAP mmol/L 10   CALCIUM mg/dL 9 2   ALBUMIN g/dL 3 8   TOTAL BILIRUBIN mg/dL 3 70*   ALK PHOS U/L 252*   ALT U/L 218*   AST U/L 108*   GLUCOSE RANDOM mg/dL 176*     Results from last 7 days   Lab Units 12/05/19  1229   INR  1 06             Results from last 7 days   Lab Units 12/05/19  1229   LACTIC ACID mmol/L 1 8       Imaging: I have personally reviewed pertinent reports  CT abdomen pelvis with contrast   ED Interpretation by Latanya Ramirez PA-C (12/05 7675)   Cholelithiasis and choledocholithiasis with findings concerning for acute cholecystitis with mild extrahepatic ductal dilatation  There appears to be a punctate 2 mm calculi within the cystic duct and the common bile duct as discussed with mild proximal common bile duct dilatation of 8 mm  GI consultation is recommended    Mild inflammatory changes about the 2nd portion of the duodenum likely representing secondary duodenitis  Hepatic splenomegaly  Hepatic and renal cysts  Pericardial effusion      Final Result by Bro Kim MD (12/05 [id number])      Cholelithiasis and choledocholithiasis with findings concerning for acute cholecystitis and mild extrahepatic ductal dilatation  There appear to be punctate 2 mm calculi within the cystic duct and the common bile duct as discussed with mild proximal CBD    dilatation of the millimeters  GI consultation is recommended  Mild inflammatory changes about the second portion of duodenum likely representing secondary duodenitis  Hepatosplenomegaly  Hepatic and renal cysts  GI consultation is recommended  Pericardial effusion  I personally discussed this study with Song Babin on 12/5/2019 at 2:26 PM                      Workstation performed: [id number]             EKG, Pathology, and Other Studies Reviewed on Admission:   · EKG: paced    Allscripts / Epic Records Reviewed: Yes     ** Please Note: This note has been constructed using a voice recognition system   **

## 2019-12-05 NOTE — ASSESSMENT & PLAN NOTE
· Noted on Echo from July 2019  Unsure how chronic this is or if it has worsened  No recent SOB    · Will get repeat Echo while here

## 2019-12-05 NOTE — SEPSIS NOTE
Sepsis Note   Maron Litten 78 y o  male MRN: [id number]  Unit/Bed#: ED 19 Encounter: [id number]      qSOFA     Row Name 12/05/19 1409 12/05/19 1234 12/05/19 1150          Altered mental status GCS < 15            Respiratory Rate > / =22  0  0  0      Systolic BP < / =057  0  0  0      Q Sofa Score  0  0  0          Initial Sepsis Screening     Row Name 12/05/19 1424                Is the patient's history suggestive of a new or worsening infection? (!) Yes (Proceed)  -JG        Suspected source of infection  acute abdominal infection  -JG        Are two or more of the following signs & symptoms of infection both present and new to the patient? No  -JG        Indicate SIRS criteria          If the answer is yes to both questions, suspicion of sepsis is present          If severe sepsis is present AND tissue hypoperfusion perists in the hour after fluid resuscitation or lactate > 4, the patient meets criteria for SEPTIC SHOCK          Are any of the following organ dysfunction criteria present within 6 hours of suspected infection and SIRS criteria that are NOT considered to be chronic conditions? No  -JG        Organ dysfunction          Date of presentation of severe sepsis          Time of presentation of severe sepsis          Tissue hypoperfusion persists in the hour after crystalloid fluid administration, evidenced, by either:          Was hypotension present within one hour of the conclusion of crystalloid fluid administration?           Date of presentation of septic shock          Time of presentation of septic shock            User Key  (r) = Recorded By, (t) = Taken By, (c) = Cosigned By    [address] Name Provider Type    [address] PA-C Physician Assistant

## 2019-12-05 NOTE — ASSESSMENT & PLAN NOTE
· Noted on CT A/P; also with cystic duct dilation   · D/W GI: will require ERCP with likely stent placement; will make NPO

## 2019-12-05 NOTE — CONSULTS
Consultation - [facility]  Yamil Maza 78 y o  male MRN: [id number]  Unit/Bed#: [facility]ERMAN Encounter: [id number]        Consults    Reason for Consult / Principal Problem: acute cholecystitis and choledocholithiasis    ASSESSMENT and PLAN:    Principal Problem:    Acute cholecystitis  Active Problems:    Hyperlipidemia    Nonrheumatic aortic valve stenosis    History of CVA (cerebrovascular accident)    Choledocholithiasis    Transaminitis    Pericardial effusion    #1  Acute cholecystitis and choledocholithiasis:  Patient presented with abdominal pain, noted to have elevated LFTs with a bilirubin of 3 7  Also has an elevated white blood cell count  Afebrile  CT scan scan is showing stones within the gallbladder, signs of acute cholecystitis, stones within the cystic duct, and dilated CBD with stones within the CBD  Patient is on Plavix and last dose was yesterday  -defer diet today to surgery, NPO after midnight  -IV fluid hydration  -discussed with patient and his wife that we will need to ERCP with stent placement due to his use of Plavix  We will then have to plan for an outpatient repeat ERCP to remove the stent and do a sphincterotomy with stone extraction after holding the Plavix for 5 days  Patient reports that he may be discontinuing the Plavix indefinitely in January  -plan for ERCP tomorrow  -pain control and antiemetics as needed  -monitor LFTs  -antibiotics per surgery    -------------------------------------------------------------------------------------------------------------------    HPI:  This is an 78-year-old male with a history of TIA and stroke on Plavix, hypertension, hyperlipidemia, and aortic stenosis who presented to the emergency room due to abdominal pain  He reports that it began on Tuesday after eating  He reports that it then went away later in the evening but then began again yesterday after eating ice cream and chocolate    He reports that he occasionally will get constipation and he took a dose of MiraLax yesterday  His last bowel movement was 2 days ago  He denies straining or hard stools  He denies any blood in the stool or black tarry stools  Denies any nausea or vomiting  He reports never having pain like this in the past   He denies any darkening of the urine  He denies any fevers or chills at home  He currently feels an ache in the upper abdomen but otherwise has no other symptoms  He reports occasional alcohol use  He last had 2 drinks 2 days ago  He denies any significant NSAID use but will occasionally use ibuprofen  REVIEW OF SYSTEMS:    CONSTITUTIONAL: Denies any fever, chills, or rigors  Good appetite, and no recent weight loss  HEENT: No earache or tinnitus  Denies hearing loss or visual disturbances  CARDIOVASCULAR: No chest pain or palpitations  RESPIRATORY: Denies any cough, hemoptysis, shortness of breath or dyspnea on exertion  GASTROINTESTINAL: As noted in the History of Present Illness  GENITOURINARY: No problems with urination  Denies any hematuria or dysuria  NEUROLOGIC: No dizziness or vertigo, denies headaches  MUSCULOSKELETAL: Denies any muscle or joint pain  SKIN: Denies skin rashes or itching  ENDOCRINE: Denies excessive thirst  Denies intolerance to heat or cold  PSYCHOSOCIAL: Denies depression or anxiety  Denies any recent memory loss         Historical Information   Past Medical History:   Diagnosis Date    Acute ischemic left MCA stroke ([facility] [address] ) 2/4/2016    Aortic stenosis     Gastric ulcer 2017    Hyperlipidemia     Hypertension     TIA (transient ischemic attack)      Past Surgical History:   Procedure Laterality Date    CARDIAC PACEMAKER PLACEMENT      CAROTID ENDARTARECTOMY Left 2/10/2016    Procedure: ENDARTERECTOMY ARTERY CAROTID  with patch graft  introp duplex scan;  Surgeon: Modesto Keene MD;  Location: [facility];  Service:     ESOPHAGOGASTRODUODENOSCOPY N/A 5/23/2016    Procedure: ESOPHAGOGASTRODUODENOSCOPY (EGD); Surgeon: David Antunez MD;  [facility]; Service:     ESOPHAGOGASTRODUODENOSCOPY N/A 2016    Procedure: ESOPHAGOGASTRODUODENOSCOPY (EGD); Surgeon: David Antunez MD;  Location: [facility]; Service:      Social History   Social History     Substance and Sexual Activity   Alcohol Use Yes    Alcohol/week: 6 0 standard drinks    Types: 6 Cans of beer per week    Frequency: 2-3 times a week    Drinks per session: 1 or 2    Binge frequency: Never    Comment: reduced ~1 week ago from 6-10/day (42+ /wk)     Social History     Substance and Sexual Activity   Drug Use No     Social History     Tobacco Use   Smoking Status Former Smoker    Packs/day:  00    Years: 15     Pack years: 15 00    Types: Cigarettes    Start date:     Last attempt to quit:     Years since quittin 9   Smokeless Tobacco Never Used     Family History   Problem Relation Age of Onset    Leukemia Brother     Leukemia Mother        Meds/Allergies       (Not in a hospital admission)  Current Facility-Administered Medications   Medication Dose Route Frequency    ceFAZolin (ANCEF) IVPB (premix) 2,000 mg  2,000 mg Intravenous Q8H    metroNIDAZOLE (FLAGYL) IVPB (premix) 500 mg  500 mg Intravenous Once    metroNIDAZOLE (FLAGYL) IVPB (premix) 500 mg  500 mg Intravenous Q8H       No Known Allergies        Objective     Blood pressure 149/68, pulse 74, temperature 98 3 °F (36 8 °C), temperature source Oral, resp  rate 18, height 5' 11" (1 803 m), weight 116 kg (255 lb 11 7 oz), SpO2 90 %        Intake/Output Summary (Last 24 hours) at 2019 1641  Last data filed at 2019 1520  Gross per 24 hour   Intake 1050 ml   Output    Net 1050 ml         PHYSICAL EXAM:      General Appearance:   Alert, cooperative, no distress, appears stated age    HEENT:   Normocephalic, atraumatic, scleral icterus present, no oropharyngeal thrush present      Neck:  Supple, symmetrical, trachea midline, no adenopathy;    thyroid: no enlargement/tenderness/nodules; no carotid  bruit or JVD    Lungs:   Clear to auscultation bilaterally; no rales, rhonchi or wheezing; respirations unlabored    Heart[de-identified]   S1 and S2 normal; regular rate and rhythm; murmur present, no rub, or gallop  Abdomen:   Soft, reports an ache in the epigastric region but denies any worsening pain with palpation, non-distended; normal bowel sounds; no masses, no organomegaly    Genitalia:   Deferred    Rectal:   Deferred    Extremities:  No cyanosis, clubbing or edema    Pulses:  2+ and symmetric all extremities    Skin:  Skin  texture, turgor normal, no rashes or lesions; jaundice present    Lymph nodes:  No palpable cervical, axillary or inguinal lymphadenopathy        Lab Results:   Results from last 7 days   Lab Units 12/05/19  1229   WBC Thousand/uL 11 18*   HEMOGLOBIN g/dL 14 3   HEMATOCRIT % 43 0   PLATELETS Thousands/uL 113*   NEUTROS PCT % 91*   LYMPHS PCT % 4*   MONOS PCT % 5   EOS PCT % 0     Results from last 7 days   Lab Units 12/05/19  1229   POTASSIUM mmol/L 4 2   CHLORIDE mmol/L 97*   CO2 mmol/L 26   BUN mg/dL 21   CREATININE mg/dL 0 92   CALCIUM mg/dL 9 2   ALK PHOS U/L 252*   ALT U/L 218*   AST U/L 108*     Results from last 7 days   Lab Units 12/05/19  1229   INR  1 06     Results from last 7 days   Lab Units 12/05/19  1229   LIPASE u/L 236       Imaging Studies: I have personally reviewed pertinent imaging studies  Ct Abdomen Pelvis With Contrast    Result Date: 12/5/2019  Impression: Cholelithiasis and choledocholithiasis with findings concerning for acute cholecystitis and mild extrahepatic ductal dilatation  There appear to be punctate 2 mm calculi within the cystic duct and the common bile duct as discussed with mild proximal CBD dilatation of the millimeters  GI consultation is recommended  Mild inflammatory changes about the second portion of duodenum likely representing secondary duodenitis  Hepatosplenomegaly  Hepatic and renal cysts  GI consultation is recommended  Pericardial effusion  I personally discussed this study with Errol Anand on 12/5/2019 at 2:26 PM  Workstation performed: [id number]           Patient was seen and examined by Dr Eileen Yang  All morales medical decisions were made by Dr Eileen Yang  Thank you for allowing us to participate in the care of this present patient  We will follow-up with you closely

## 2019-12-05 NOTE — ASSESSMENT & PLAN NOTE
· Noted on CT A/P  · D/W Surgery: would not plan for surgical intervention immediately; continue ancef/flagyl; appreciate GI eval

## 2019-12-05 NOTE — ED PROVIDER NOTES
History  Chief Complaint   Patient presents with    Epigastric Pain     patient reports intermittent epigastric pain that worsens after eating  denies N/V/D CP or SOB  relieved after taking elizabeth-seltzer     Patient presents emergency room with complaints of epigastric pain that started yesterday  He states that it is a constant ache but that has episodes of increasing pain  He denies any radiation of the pain to his back or chest wall  He denies any shortness of breath, nausea or vomiting  He denies any diaphoresis with the symptoms  He states the pain was worse after eating approximately 20 minutes after ingesting his meal   He made a mistake abating and she states yesterday and then had an acute onset of pain that lasted anywhere from 30 minutes to an hour that was relieved with Tums  He then had a tuna fish sandwich with ice cream that exacerbated his symptoms that was relieved with [place]  He has a past medical history that is positive for an acute ischemic left MCA stroke, hypertension, hyperlipidemia, TIA, heart block, aortic stenosis, hiatal hernia  Patient surgical history includes a carotid endarterectomy, endoscopy x2, pacemaker  Patient is a former smoker  He moderately uses alcohol  Differential diagnosis includes but is not limited to cholecystitis, choledocholithiasis, pancreatitis, duodenal ulcer, peptic ulcer disease, gastritis, esophagitis, colitis, diverticulitis, acute coronary syndrome        History provided by:  Patient  Abdominal Pain   Pain location:  Epigastric  Pain quality: aching    Pain radiates to:  Does not radiate  Pain severity:  Moderate  Onset quality:  Gradual  Timing:  Constant  Progression:  Waxing and waning  Chronicity:  New  Context: awakening from sleep and eating    Context: not alcohol use, not diet changes, not laxative use, not medication withdrawal, not previous surgeries, not recent illness, not recent sexual activity, not retching, not sick contacts, not suspicious food intake and not trauma    Relieved by: Elizabeth lyons and Tums  Worsened by:  Nothing  Associated symptoms: no anorexia, no belching, no chest pain, no chills, no constipation, no cough, no diarrhea, no dysuria, no fatigue, no fever, no flatus, no hematemesis, no hematochezia, no hematuria, no melena, no nausea, no shortness of breath, no sore throat and no vomiting    Risk factors: being elderly and obesity    Risk factors: no alcohol abuse, no aspirin use, has not had multiple surgeries, no NSAID use and no recent hospitalization        Prior to Admission Medications   Prescriptions Last Dose Informant Patient Reported? Taking? Ascorbic Acid (VITAMIN C) 100 MG tablet  Self Yes No   Sig: Take 100 mg by mouth daily   Biotin 1 MG CAPS  Self Yes No   Sig: Take 1 mg by mouth   CO-ENZYME Q-10 PO  Self Yes No   Sig: Take 1 tablet by mouth daily  Garlic 10 MG CAPS  Self Yes No   Sig: Take 10 mg by mouth   Grape Seed 100 MG CAPS  Self Yes No   Sig: Take 100 mg by mouth   Lutein 10 MG TABS  Self Yes No   Sig: Take 10 mg by mouth   Multiple Vitamins-Minerals (ONE-A-DAY MENS 50+ ADVANTAGE PO)  Self Yes No   Sig: Take by mouth   Omega-3 Fatty Acids (FISH OIL) 1,000 mg  Self Yes No   Sig: Take 1,000 mg by mouth daily  aspirin 81 MG tablet  Self Yes No   Sig: Take 81 mg by mouth daily  atorvastatin (LIPITOR) 40 mg tablet  Self No No   Sig: Take 1 tablet (40 mg total) by mouth daily   clopidogrel (PLAVIX) 75 mg tablet  Self No No   Sig: Take 1 tablet (75 mg total) by mouth daily   cyanocobalamin (VITAMIN B-12) 100 mcg tablet  Self Yes No   Sig: Take 50 mcg by mouth daily   ferrous sulfate 325 (65 Fe) mg tablet  Self Yes No   Sig: Take 325 mg by mouth daily with breakfast   folic acid (FOLVITE) 516 MCG tablet  Self Yes No   Sig: Take 400 mcg by mouth daily     levOCARNitine (L-CARNITINE) 250 MG CAPS  Self Yes No   Sig: Take 250 mg by mouth   lisinopril (ZESTRIL) 5 mg tablet  Self No No   Sig: Take 1 tablet (5 mg total) by mouth daily   magnesium 30 MG tablet  Self Yes No   Sig: Take 30 mg by mouth 2 (two) times a day   milk thistle 175 MG tablet  Self Yes No   Sig: Take 175 mg by mouth daily   thiamine 100 MG tablet  Self Yes No   Sig: Take 100 mg by mouth 2 (two) times a day  Facility-Administered Medications: None       Past Medical History:   Diagnosis Date    Acute ischemic left MCA stroke (HCC) 2016    Aortic stenosis     Gastric ulcer 2017    Hyperlipidemia     Hypertension     TIA (transient ischemic attack)        Past Surgical History:   Procedure Laterality Date    CARDIAC PACEMAKER PLACEMENT      CAROTID ENDARTARECTOMY Left 2/10/2016    Procedure: ENDARTERECTOMY ARTERY CAROTID  with patch graft  introp duplex scan;  Surgeon: Lennox Rankin, MD;  Location: [facility];  Service:     ESOPHAGOGASTRODUODENOSCOPY N/A 2016    Procedure: ESOPHAGOGASTRODUODENOSCOPY (EGD); Surgeon: David Antunez MD;  Location: [facility]; Service:     ESOPHAGOGASTRODUODENOSCOPY N/A 2016    Procedure: ESOPHAGOGASTRODUODENOSCOPY (EGD); Surgeon: David Antunez MD;  Location: [facility]; Service:        Family History   Problem Relation Age of Onset    Leukemia Brother     Leukemia Mother      I have reviewed and agree with the history as documented  Social History     Tobacco Use    Smoking status: Former Smoker     Packs/day: 1 00     Years: 15 00     Pack years: 15      Types: Cigarettes     Start date:      Last attempt to quit:      Years since quittin 9    Smokeless tobacco: Never Used   Substance Use Topics    Alcohol use: Yes     Alcohol/week: 6 0 standard drinks     Types: 6 Cans of beer per week     Frequency: 2-3 times a week     Drinks per session: 1 or 2     Binge frequency: Never     Comment: reduced ~1 week ago from 6-10/day (42+ /wk)    Drug use: No        Review of Systems   Constitutional: Positive for appetite change   Negative for activity change, chills, fatigue and fever    HENT: Negative for congestion, dental problem, ear discharge, ear pain, postnasal drip, rhinorrhea and sore throat  Eyes: Negative for pain, discharge, redness and itching  Respiratory: Negative for cough, chest tightness and shortness of breath  Cardiovascular: Negative for chest pain and palpitations  Gastrointestinal: Positive for abdominal pain  Negative for anorexia, constipation, diarrhea, flatus, hematemesis, hematochezia, melena, nausea and vomiting  Genitourinary: Negative for dysuria, frequency, hematuria and urgency  Musculoskeletal: Negative for back pain  Skin: Negative for color change, pallor and rash  Psychiatric/Behavioral: Negative for confusion  All other systems reviewed and are negative  Physical Exam  Physical Exam   Constitutional: He is oriented to person, place, and time  He appears well-developed and well-nourished  No distress  HENT:   Head: Normocephalic  Right Ear: External ear normal    Left Ear: External ear normal    Nose: Nose normal    Mouth/Throat: Oropharynx is clear and moist    Yellow discoloration of the soft palate consistent with jaundice   Eyes: Scleral icterus is present  Neck: Neck supple  No JVD present  Cardiovascular: Normal rate and regular rhythm  Exam reveals no gallop and no friction rub  Murmur heard  Pansystolic murmur 2nd ICS radiating along the left sternal border  Pulmonary/Chest: Effort normal and breath sounds normal  No stridor  No respiratory distress  He has no wheezes  He has no rales  Abdominal: Soft  He exhibits no distension  There is no tenderness  There is no rebound and no guarding  Musculoskeletal: He exhibits no edema  Lymphadenopathy:     He has no cervical adenopathy  Neurological: He is alert and oriented to person, place, and time  Skin: Skin is warm  Capillary refill takes less than 2 seconds  He is not diaphoretic  Jaundice   Psychiatric: He has a normal mood and affect   His behavior is normal  Judgment and thought content normal    Nursing note and vitals reviewed        Vital Signs  ED Triage Vitals   Temperature Pulse Respirations Blood Pressure SpO2   12/05/19 1150 12/05/19 1150 12/05/19 1150 12/05/19 1150 12/05/19 1150   98 3 °F (36 8 °C) 85 18 (!) 205/91 96 %      Temp Source Heart Rate Source Patient Position - Orthostatic VS BP Location FiO2 (%)   12/05/19 1150 12/05/19 1150 12/05/19 1150 12/05/19 1150 --   Oral Monitor Lying Right arm       Pain Score       12/05/19 1234       7           Vitals:    12/05/19 1500 12/05/19 1514 12/05/19 1515 12/05/19 1704   BP: 161/70 149/68 149/68 159/74   Pulse: 82 86 74 69   Patient Position - Orthostatic VS:   Sitting Lying         Visual Acuity      ED Medications  Medications   ceFAZolin (ANCEF) IVPB (premix) 2,000 mg (has no administration in time range)   metroNIDAZOLE (FLAGYL) IVPB (premix) 500 mg (has no administration in time range)   sodium chloride 0 9 % infusion (100 mL/hr Intravenous New Bag 12/5/19 1702)   sodium chloride 0 9 % bolus 1,000 mL (0 mL Intravenous Stopped 12/5/19 1409)   morphine (PF) 4 mg/mL injection 4 mg (4 mg Intravenous Given 12/5/19 1246)   ondansetron (ZOFRAN) injection 4 mg (4 mg Intravenous Given 12/5/19 1246)   iohexol (OMNIPAQUE) 350 MG/ML injection (MULTI-DOSE) 100 mL (100 mL Intravenous Given 12/5/19 1334)   ceFAZolin (ANCEF) IVPB (premix) 2,000 mg (0 mg Intravenous Stopped 12/5/19 1520)   metroNIDAZOLE (FLAGYL) IVPB (premix) 500 mg (500 mg Intravenous New Bag 12/5/19 1630)       Diagnostic Studies  Results Reviewed     Procedure Component Value Units Date/Time    Urine Microscopic [[id number]]  (Abnormal) Collected:  12/05/19 1412    Lab Status:  Final result Specimen:  Urine, Clean Catch Updated:  12/05/19 1502     RBC, UA 1-2 /hpf      WBC, UA 0-1 /hpf      Epithelial Cells Occasional /hpf      Bacteria, UA Occasional /hpf     Magnesium [[id number]]  (Normal) Collected:  12/05/19 1402    Lab Status:  Final result Specimen:  Blood Updated:  12/05/19 1422     Magnesium 1 6 mg/dL     Urine Macroscopic, POC [[id number]]  (Abnormal) Collected:  12/05/19 1412    Lab Status:  Final result Specimen:  Urine Updated:  12/05/19 1412     Color, UA Yellow     Clarity, UA Clear     pH, UA 6 0     Leukocytes, UA Negative     Nitrite, UA Negative     Protein, UA 30 (1+) mg/dl      Glucose, UA Negative mg/dl      Ketones, UA 15 (1+) mg/dl      Urobilinogen, UA 1 0 E U /dl      Bilirubin, UA Negative     Blood, UA Small     Specific [place], UA 1 015    Narrative:       CLINITEK RESULT    CBC and differential [[id number]]  (Abnormal) Collected:  12/05/19 1229    Lab Status:  Final result Specimen:  Blood from Arm, Right Updated:  12/05/19 1319     WBC 11 18 Thousand/uL      RBC 4 73 Million/uL      Hemoglobin 14 3 g/dL      Hematocrit 43 0 %      MCV 91 fL      MCH 30 2 pg      MCHC 33 3 g/dL      RDW 13 2 %      MPV 10 3 fL      Platelets 324 Thousands/uL      nRBC 0 /100 WBCs      Neutrophils Relative 91 %      Immat GRANS % 0 %      Lymphocytes Relative 4 %      Monocytes Relative 5 %      Eosinophils Relative 0 %      Basophils Relative 0 %      Neutrophils Absolute 10 07 Thousands/µL      Immature Grans Absolute 0 04 Thousand/uL      Lymphocytes Absolute 0 46 Thousands/µL      Monocytes Absolute 0 59 Thousand/µL      Eosinophils Absolute 0 00 Thousand/µL      Basophils Absolute 0 02 Thousands/µL     Lactic acid, plasma [[id number]]  (Normal) Collected:  12/05/19 1229    Lab Status:  Final result Specimen:  Blood from Arm, Right Updated:  12/05/19 1318     LACTIC ACID 1 8 mmol/L     Narrative:       Result may be elevated if tourniquet was used during collection      Comprehensive metabolic panel [[id number]]  (Abnormal) Collected:  12/05/19 1229    Lab Status:  Final result Specimen:  Blood from Arm, Right Updated:  12/05/19 1316     Sodium 133 mmol/L      Potassium 4 2 mmol/L      Chloride 97 mmol/L      CO2 26 mmol/L      ANION GAP 10 mmol/L      BUN 21 mg/dL      Creatinine 0 92 mg/dL      Glucose 176 mg/dL      Calcium 9 2 mg/dL       U/L       U/L      Alkaline Phosphatase 252 U/L      Total Protein 7 7 g/dL      Albumin 3 8 g/dL      Total Bilirubin 3 70 mg/dL      eGFR 78 ml/min/1 73sq m     Narrative:       [place] guidelines for Chronic Kidney Disease (CKD):     Stage 1 with normal or high GFR (GFR > 90 mL/min/1 73 square meters)    Stage 2 Mild CKD (GFR = 60-89 mL/min/1 73 square meters)    Stage 3A Moderate CKD (GFR = 45-59 mL/min/1 73 square meters)    Stage 3B Moderate CKD (GFR = 30-44 mL/min/1 73 square meters)    Stage 4 Severe CKD (GFR = 15-29 mL/min/1 73 square meters)    Stage 5 End Stage CKD (GFR <15 mL/min/1 73 square meters)  Note: GFR calculation is accurate only with a steady state creatinine    Troponin I [[id number]] Collected:  12/05/19 1229    Lab Status:  Final result Specimen:  Blood from Arm, Right Updated:  12/05/19 1314     Troponin I 0 05 ng/mL     Lipase [[id number]]  (Normal) Collected:  12/05/19 1229    Lab Status:  Final result Specimen:  Blood from Arm, Right Updated:  12/05/19 1312     Lipase 236 u/L     Protime-INR [[id number]]  (Normal) Collected:  12/05/19 1229    Lab Status:  Final result Specimen:  Blood from Arm, Right Updated:  12/05/19 1306     Protime 13 2 seconds      INR 1 06    APTT [[id number]]  (Normal) Collected:  12/05/19 1229    Lab Status:  Final result Specimen:  Blood from Arm, Right Updated:  12/05/19 1306     PTT 31 seconds                  CT abdomen pelvis with contrast   ED Interpretation by Deonna Hernandez PA-C (12/05 1545)   Cholelithiasis and choledocholithiasis with findings concerning for acute cholecystitis with mild extrahepatic ductal dilatation  There appears to be a punctate 2 mm calculi within the cystic duct and the common bile duct as discussed with mild proximal common bile duct dilatation of 8 mm    GI consultation is recommended  Mild inflammatory changes about the 2nd portion of the duodenum likely representing secondary duodenitis  Hepatic splenomegaly  Hepatic and renal cysts  Pericardial effusion      Final Result by Aniyah Chavez MD (12/05 1426)      Cholelithiasis and choledocholithiasis with findings concerning for acute cholecystitis and mild extrahepatic ductal dilatation  There appear to be punctate 2 mm calculi within the cystic duct and the common bile duct as discussed with mild proximal CBD    dilatation of the millimeters  GI consultation is recommended  Mild inflammatory changes about the second portion of duodenum likely representing secondary duodenitis  Hepatosplenomegaly  Hepatic and renal cysts  GI consultation is recommended  Pericardial effusion  I personally discussed this study with Barron Wright on 12/5/2019 at 2:26 PM                      Workstation performed: [id number]                    Procedures  ECG 12 Lead Documentation Only  Date/Time: 12/5/2019 12:38 PM  Performed by: John Bishop PA-C  Authorized by: John Bishop PA-C     Indications / Diagnosis:  Epigastric pain  ECG reviewed by me, the ED Provider: yes    Patient location:  ED  Previous ECG:     Previous ECG:  Compared to current    Comparison ECG info:  7/11/19    Similarity:  Changes noted    Comparison to cardiac monitor: Yes    Interpretation:     Interpretation: abnormal    Rate:     ECG rate:  90    ECG rate assessment: normal    Rhythm:     Rhythm: sinus rhythm and A-V block    Ectopy:     Ectopy: none    QRS:     QRS axis:  Left    QRS intervals:   Wide  Conduction:     Conduction: abnormal      Abnormal conduction: incomplete LBBB    ST segments:     ST segments:  Abnormal    Depression:  II, III and aVF  T waves:     T waves: non-specific    Comments:      Septal infarct-sided on or before July 11, 2019    Marked ST abnormality, possible inferior subendocardial injury    Prolonged QT interval     Abnormal EKG  ED Course  ED Course as of Dec 05 1706   Thu Dec 05, 2019   1538 Patient was seen by Servando aHrdy from surgery  Plan to admit the patient medically  Patient will need a GI consult for evaluation of his choledocholithiasis with secondary cholecystitis  Initial Sepsis Screening     Row Name 12/05/19 1424                Is the patient's history suggestive of a new or worsening infection? (!) Yes (Proceed)  -JG        Suspected source of infection  acute abdominal infection  -JG        Are two or more of the following signs & symptoms of infection both present and new to the patient? No  -JG        Indicate SIRS criteria          If the answer is yes to both questions, suspicion of sepsis is present          If severe sepsis is present AND tissue hypoperfusion perists in the hour after fluid resuscitation or lactate > 4, the patient meets criteria for SEPTIC SHOCK          Are any of the following organ dysfunction criteria present within 6 hours of suspected infection and SIRS criteria that are NOT considered to be chronic conditions? No  -JG        Organ dysfunction          Date of presentation of severe sepsis          Time of presentation of severe sepsis          Tissue hypoperfusion persists in the hour after crystalloid fluid administration, evidenced, by either:          Was hypotension present within one hour of the conclusion of crystalloid fluid administration?           Date of presentation of septic shock          Time of presentation of septic shock            User Key  (r) = Recorded By, (t) = Taken By, (c) = Cosigned By    [address] Name Provider Type    [address] PA-C Physician Assistant                  MDM  Number of Diagnoses or Management Options  Choledocholithiasis with acute cholecystitis: new and requires workup  Pericardial effusion: new and requires workup  Transaminitis: new and requires workup     Amount and/or Complexity of Data Reviewed  Clinical lab tests: ordered and reviewed  Tests in the radiology section of CPT®: ordered and reviewed  Tests in the medicine section of CPT®: ordered and reviewed    Risk of Complications, Morbidity, and/or Mortality  Presenting problems: high  Diagnostic procedures: high  Management options: high  General comments: Patient presented to the emergency room with a 2 day history of abdominal pain that was epigastric is worse after eating  He was seen and examined  A CT scan of the abdomen and pelvis demonstrated acute cholecystitis with choledocholithiasis  Patient had transaminitis as well  Then he had a moderate-sized pericardial effusion is-etiology on known  Patient's laboratory studies were reviewed  He did have an elevated bilirubin  He was admitted to the [facility] service  He was given Ancef and Flagyl for antibiotic coverage  Consult for surgery was placed  Consult was placed for Gastroenterology as well      Patient Progress  Patient progress: stable        Disposition  Final diagnoses:   Choledocholithiasis with acute cholecystitis   Transaminitis   Pericardial effusion     Time reflects when diagnosis was documented in both MDM as applicable and the Disposition within this note     Time User Action Codes Description Comment    12/5/2019  2:46 PM Enriqueta Simmonds Add [K80 00] Acute cholecystitis due to biliary calculus     12/5/2019  2:46 PM Enriqueta Simmonds Add [R74 0] Transaminitis     12/5/2019  3:38 PM Enriqueta Simmonds Add [K80 42] Choledocholithiasis with acute cholecystitis     12/5/2019  3:38 PM Enriqueta Simmonds Modify [R74 0] Transaminitis     12/5/2019  3:38 PM Enriqueta Simmonds Remove [K80 00] Acute cholecystitis due to biliary calculus     12/5/2019  3:38 PM Enriqueta Simmonds Modify [K80 42] Choledocholithiasis with acute cholecystitis     12/5/2019  3:38 PM Enriqueta Simmonds Remove [R74 0] Transaminitis     12/5/2019  3:38 PM Kimberly Khan Add [R74 0] Transaminitis     12/5/2019  3:46 PM Kimberly Khan Add [I31 3] Pericardial effusion       ED Disposition     ED Disposition Condition Date/Time Comment    Admit Stable Thu Dec 5, 2019  3:47 PM Case was discussed with Dr Denise Zamora and the patient's admission status was agreed to be Admission Status: inpatient status to the service of Dr Denise Zamora   Follow-up Information    None         Current Discharge Medication List      CONTINUE these medications which have NOT CHANGED    Details   Ascorbic Acid (VITAMIN C) 100 MG tablet Take 100 mg by mouth daily      aspirin 81 MG tablet Take 81 mg by mouth daily  atorvastatin (LIPITOR) 40 mg tablet Take 1 tablet (40 mg total) by mouth daily  Qty: 30 tablet, Refills: 0    Associated Diagnoses: TIA involving carotid artery      Biotin 1 MG CAPS Take 1 mg by mouth      clopidogrel (PLAVIX) 75 mg tablet Take 1 tablet (75 mg total) by mouth daily  Qty: 30 tablet, Refills: 0    Associated Diagnoses: TIA involving carotid artery      CO-ENZYME Q-10 PO Take 1 tablet by mouth daily  cyanocobalamin (VITAMIN B-12) 100 mcg tablet Take 50 mcg by mouth daily      ferrous sulfate 325 (65 Fe) mg tablet Take 325 mg by mouth daily with breakfast      folic acid (FOLVITE) 556 MCG tablet Take 400 mcg by mouth daily  Garlic 10 MG CAPS Take 10 mg by mouth      Grape Seed 100 MG CAPS Take 100 mg by mouth      levOCARNitine (L-CARNITINE) 250 MG CAPS Take 250 mg by mouth      lisinopril (ZESTRIL) 5 mg tablet Take 1 tablet (5 mg total) by mouth daily  Qty: 30 tablet, Refills: 0    Associated Diagnoses: Hypertension      Lutein 10 MG TABS Take 10 mg by mouth      magnesium 30 MG tablet Take 30 mg by mouth 2 (two) times a day      milk thistle 175 MG tablet Take 175 mg by mouth daily      Multiple Vitamins-Minerals (ONE-A-DAY MENS 50+ ADVANTAGE PO) Take by mouth      Omega-3 Fatty Acids (FISH OIL) 1,000 mg Take 1,000 mg by mouth daily        thiamine 100 MG tablet Take 100 mg by mouth 2 (two) times a day  No discharge procedures on file      ED Provider  Electronically Signed by           Regina Parirsh PA-C  12/05/19 4224

## 2019-12-05 NOTE — CONSULTS
Consultation -General Surgery  Tramaine Butcher 78 y o  male MRN: [id number]  Unit/Bed#: ED 19 Encounter: [id number]        Consults    ASSESSMENT/PLAN:  Problem List   Acute cholecystitis/choledocholithiasis  Transaminitis  Hyperbilrubinemia     TIA involving carotid artery (Chronic)    Hypertension (Chronic)    CVA (cerebral vascular accident) ([address] ) (Chronic)    Carotid artery stenosis with cerebral infarction (HCC) (Chronic)    Heart block    Hyperlipidemia (Chronic)    [employer] [place] XT MRI safe dual chamber PM    Nonrheumatic aortic valve stenosis   79 yo M with multiple medical issues admitted with acute cholecystitis with choledocholithiasis, slight leukocytosis, transaminitis and hyperbilirubinemia  Will ask GI to evaluate for potential ERCP prior to surgical intervention  Lipase is normal   · IVF  · IV abx  · Pain control  · IS  · GI consult   · AM labs        Reason for Consult / Principal Problem:  Abdominal pain    HPI: Tramaine Butcher is a 78y o  year old male with history of PPM, TIA, CVA, HLD, AO stenosis who presents to ED for evaluation of epigastric/RUQ abdominal pain for 2 days  Pain started 2 days ago after eating a steak sandwich  Pain was constant and did not radiate  Nothing made it better, but eating made it worse  He took Tums but it didn't help  The next day he had ice cream and tuna sandwich  The pain increased but this time it didn't go away  He says he hasn't slept in 2 nights  Denies N/V/D, fever or chills  No BM x2 days  No previous history of pancreatitis as he drinks about 6 drinks per week  Review of Systems   Constitutional: Positive for activity change  Negative for appetite change, chills, diaphoresis, fatigue, fever and unexpected weight change  HENT: Negative  Eyes: Negative  Respiratory: Negative  Cardiovascular: Negative  Gastrointestinal: Positive for abdominal pain and constipation  Negative for blood in stool, diarrhea, nausea and vomiting  Endocrine: Negative  Genitourinary: Negative  Musculoskeletal: Negative  Skin: Negative  Negative for color change  Neurological: Negative  Psychiatric/Behavioral: Negative  Historical Information   Past Medical History:   Diagnosis Date    Acute ischemic left MCA stroke ([address] ) 2016    Aortic stenosis     Gastric ulcer 2017    Hyperlipidemia     Hypertension     TIA (transient ischemic attack)      Past Surgical History:   Procedure Laterality Date    CARDIAC PACEMAKER PLACEMENT      CAROTID ENDARTARECTOMY Left 2/10/2016    Procedure: ENDARTERECTOMY ARTERY CAROTID  with patch graft  introp duplex scan;  Surgeon: Ramez Graves MD;  Location: [facility];  Service:     ESOPHAGOGASTRODUODENOSCOPY N/A 2016    Procedure: ESOPHAGOGASTRODUODENOSCOPY (EGD); Surgeon: Charlee Clemons MD;  Location: [facility]; Service:     ESOPHAGOGASTRODUODENOSCOPY N/A 2016    Procedure: ESOPHAGOGASTRODUODENOSCOPY (EGD); Surgeon: Charlee Clemons MD;  Location: [facility];   Service:      Social History   Social History     Substance and Sexual Activity   Alcohol Use Yes    Alcohol/week: 6 0 standard drinks    Types: 6 Cans of beer per week    Frequency: 2-3 times a week    Drinks per session: 1 or 2    Binge frequency: Never    Comment: reduced ~1 week ago from 6-10/day (42+ /wk)     Social History     Substance and Sexual Activity   Drug Use No     Social History     Tobacco Use   Smoking Status Former Smoker    Packs/day: 1 00    Years: 15 00    Pack years: 15 00    Types: Cigarettes    Start date:     Last attempt to quit:     Years since quittin 9   Smokeless Tobacco Never Used     Family History   Problem Relation Age of Onset    Leukemia Brother     Leukemia Mother        Meds/Allergies   Home medications:   Saw palmetto   Fish oil   Acidophilus  Vit B 12  Bit B  collagen  bilberry  Baby asa  Plavix  lipotor  lisinopril    Current Facility-Administered Medications Medication Dose Route Frequency    metroNIDAZOLE (FLAGYL) IVPB (premix) 500 mg  500 mg Intravenous Once       No Known Allergies    Objective     Blood pressure 149/68, pulse 74, temperature 98 3 °F (36 8 °C), temperature source Oral, resp  rate 18, height 5' 11" (1 803 m), weight 116 kg (255 lb 11 7 oz), SpO2 90 %      Intake/Output Summary (Last 24 hours) at 12/5/2019 1535  Last data filed at 12/5/2019 1409  Gross per 24 hour   Intake 1000 ml   Output    Net 1000 ml       PHYSICAL EXAM  General appearance: alert and oriented, in no acute distress  Skin: warm and dry, +icterus  Head: Normocephalic, without obvious abnormality  Heart: regular rate and rhythm, S1, S2 normal, no murmur, click, rub or gallop  Lungs: clear to auscultation bilaterally  Abdomen: obese and soft, tender RUQ, no rebound or guarding  + umbilical hernia  Neurological: normal without focal findings    Lab Results:   Admission on 12/05/2019   Component Date Value    WBC 12/05/2019 11 18*    RBC 12/05/2019 4 73     Hemoglobin 12/05/2019 14 3     Hematocrit 12/05/2019 43 0     MCV 12/05/2019 91     MCH 12/05/2019 30 2     MCHC 12/05/2019 33 3     RDW 12/05/2019 13 2     MPV 12/05/2019 10 3     Platelets 23/35/6398 113*    nRBC 12/05/2019 0     Neutrophils Relative 12/05/2019 91*    Immat GRANS % 12/05/2019 0     Lymphocytes Relative 12/05/2019 4*    Monocytes Relative 12/05/2019 5     Eosinophils Relative 12/05/2019 0     Basophils Relative 12/05/2019 0     Neutrophils Absolute 12/05/2019 10 07*    Immature Grans Absolute 12/05/2019 0 04     Lymphocytes Absolute 12/05/2019 0 46*    Monocytes Absolute 12/05/2019 0 59     Eosinophils Absolute 12/05/2019 0 00     Basophils Absolute 12/05/2019 0 02     Protime 12/05/2019 13 2     INR 12/05/2019 1 06     PTT 12/05/2019 31     Sodium 12/05/2019 133*    Potassium 12/05/2019 4 2     Chloride 12/05/2019 97*    CO2 12/05/2019 26     ANION GAP 12/05/2019 10     BUN 12/05/2019 21     Creatinine 12/05/2019 0 92     Glucose 12/05/2019 176*    Calcium 12/05/2019 9 2     AST 12/05/2019 108*    ALT 12/05/2019 218*    Alkaline Phosphatase 12/05/2019 252*    Total Protein 12/05/2019 7 7     Albumin 12/05/2019 3 8     Total Bilirubin 12/05/2019 3 70*    eGFR 12/05/2019 78     Lipase 12/05/2019 236     Troponin I 12/05/2019 0 05     LACTIC ACID 12/05/2019 1 8     Magnesium 12/05/2019 1 6     Color, UA 12/05/2019 Yellow     Clarity, UA 12/05/2019 Clear     pH, UA 12/05/2019 6 0     Leukocytes, UA 12/05/2019 Negative     Nitrite, UA 12/05/2019 Negative     Protein, UA 12/05/2019 30 (1+)*    Glucose, UA 12/05/2019 Negative     Ketones, UA 12/05/2019 15 (1+)*    Urobilinogen, UA 12/05/2019 1 0     Bilirubin, UA 12/05/2019 Negative     Blood, UA 12/05/2019 Small*    Specific [place], UA 12/05/2019 1 015     RBC, UA 12/05/2019 1-2*    WBC, UA 12/05/2019 0-1*    Epithelial Cells 12/05/2019 Occasional     Bacteria, UA 12/05/2019 Occasional      Imaging Studies: I have personally reviewed pertinent reports  Counseling / Coordination of Care  Total time spent today  20 minutes  Greater than 50% of total time was spent with the patient and / or family counseling and / or coordination of care

## 2019-12-06 NOTE — ANESTHESIA POSTPROCEDURE EVALUATION
Post-Op Assessment Note    CV Status:  Stable  Pain Score: 0    Pain management: adequate     Mental Status:  Alert and awake   Hydration Status:  Euvolemic   PONV Controlled:  Controlled   Airway Patency:  Patent   Post Op Vitals Reviewed: Yes      Staff: CRNA           /78 (12/06/19 1220)    Temp (P) 97 5 °F (36 4 °C) (12/06/19 1219)    Pulse 87 (12/06/19 1220)   Resp 16 (12/06/19 1220)    SpO2 99 % (12/06/19 1220)

## 2019-12-06 NOTE — UTILIZATION REVIEW
Initial Clinical Review    Admission: Date/Time/Statement: Inpatient Admission Orders (From admission, onward)     Ordered        12/05/19 1549  Inpatient Admission (expected length of stay for this patient Order details is greater than two midnights)  Once                   Orders Placed This Encounter   Procedures    Inpatient Admission (expected length of stay for this patient Order details is greater than two midnights)     Standing Status:   Standing     Number of Occurrences:   1     Order Specific Question:   Admitting Physician     Answer:   Ирина Cooper     Order Specific Question:   Level of Care     Answer:   Med Surg [16]     Order Specific Question:   Estimated length of stay     Answer:   More than 2 Midnights     Order Specific Question:   Certification     Answer:   I certify that inpatient services are medically necessary for this patient for a duration of greater than two midnights  See H&P and MD Progress Notes for additional information about the patient's course of treatment  ED Arrival Information     Expected Arrival Acuity Means of Arrival Escorted By Service Admission Type    - 12/5/2019 11:40 Urgent Walk-In Self Hospitalist Urgent    Arrival Complaint    Abd pain        Chief Complaint   Patient presents with    Epigastric Pain     patient reports intermittent epigastric pain that worsens after eating  denies N/V/D CP or SOB  relieved after taking chelle-seltzer     Assessment/Plan: this is a 78year old male from home to ED admitted to inpatient due to abdominal pain due to cholecystitis and choledocholithiasis  Presented due to worsening epigastric pain starting yesterday  Has tried chelle seltzer and Tums  On exam has scleral icterus  Yellow discoloration of soft palate  Juandice  Cardiac murmur  Wbc 11 18  Platelets 897      Total bilirubin 3 70  CT abdomen showed acute cholecystitis with mild extrahepatic ductal dilatation   moderate-sized pericardial effusion   Surgery consulted  IVF, pain control  and IV antibiotics in progress  Plavix on hold  Per surgery - proceed with ERCP for CBD stone  Per GI - Symptomatic choledocholithiasis/cholelithiasis- would recommend supportive care with IV fluids , antibiotics  ERCP with stent placement tomorrow given patient's last dose of Plavix was yesterday    12/6/2019 Procedure - ERCP-ERCP with plastic biliary stent placement  Choledocholithiasis was noted  Stone not removed  Sphincterotomy was not performed also patient is on Plavix  Pancreatic duct was not injected or cannulated    Post procedure recommendation is to continue antibiotics  ED Triage Vitals   Temperature Pulse Respirations Blood Pressure SpO2   12/05/19 1150 12/05/19 1150 12/05/19 1150 12/05/19 1150 12/05/19 1150   98 3 °F (36 8 °C) 85 18 (!) 205/91 96 %      Temp Source Heart Rate Source Patient Position - Orthostatic VS BP Location FiO2 (%)   12/05/19 1150 12/05/19 1150 12/05/19 1150 12/05/19 1150 --   Oral Monitor Lying Right arm       Pain Score       12/05/19 1234       7        Wt Readings from Last 1 Encounters:   12/06/19 117 kg (258 lb)     Additional Vital Signs:  12/06/19 1220  97 5 °F (36 4 °C)  87  16  143/78    99 %       12/06/19 1219  97 5 °F (36 4 °C)  91  20  143/74    98 %  Simple mask     12/06/19 1040  99 2 °F (37 3 °C)  70  16  167/72    95 %  None (Room air)     12/06/19 0717  99 °F (37 2 °C)  80  18  151/71  102  92 %  None (Room air)  Lying   12/05/19 2207  98 °F (36 7 °C)  85  18  162/82  119  94 %  None (Room air)  Lying   12/05/19 1704  98 °F (36 7 °C)  69  18  159/74  106  95 %  None (Room air       Pertinent Labs/Diagnostic Test Results:  12/5/2019  CT abdomen - Cholelithiasis and choledocholithiasis with findings concerning for acute cholecystitis and mild extrahepatic ductal dilatation   There appear to be punctate 2 mm calculi within the cystic duct and the common bile duct as discussed with mild proximal CBD dilatation of the millimeters   GI consultation is recommended  Mild inflammatory changes about the second portion of duodenum likely representing secondary duodenitis  Hepatosplenomegaly  Hepatic and renal cysts  Pericardial effusion  12/5/2019 EKG - sinus and AV block  Incomplete LBBB  ST depression II, III and aVF  Non specific T  Prolonged QT        Results from last 7 days   Lab Units 12/06/19  0511 12/05/19  1229   WBC Thousand/uL 11 05* 11 18*   HEMOGLOBIN g/dL 13 2 14 3   HEMATOCRIT % 40 2 43 0   PLATELETS Thousands/uL 98* 113*   NEUTROS ABS Thousands/µL  --  10 07*     Results from last 7 days   Lab Units 12/06/19  0511 12/05/19  1402 12/05/19  1229   SODIUM mmol/L 136  --  133*   POTASSIUM mmol/L 3 9  --  4 2   CHLORIDE mmol/L 102  --  97*   CO2 mmol/L 25  --  26   ANION GAP mmol/L 9  --  10   BUN mg/dL 18  --  21   CREATININE mg/dL 0 90  --  0 92   EGFR ml/min/1 73sq m 80  --  78   CALCIUM mg/dL 8 4  --  9 2   MAGNESIUM mg/dL  --  1 6  --      Results from last 7 days   Lab Units 12/06/19  0511 12/05/19  1229   AST U/L 69* 108*   ALT U/L 171* 218*   ALK PHOS U/L 204* 252*   TOTAL PROTEIN g/dL 6 6 7 7   ALBUMIN g/dL 3 1* 3 8   TOTAL BILIRUBIN mg/dL 2 79* 3 70*     Results from last 7 days   Lab Units 12/06/19  0511 12/05/19  1229   GLUCOSE RANDOM mg/dL 117 176*     Results from last 7 days   Lab Units 12/05/19  1229   TROPONIN I ng/mL 0 05     Results from last 7 days   Lab Units 12/05/19  1229   PROTIME seconds 13 2   INR  1 06   PTT seconds 31     Results from last 7 days   Lab Units 12/05/19  1229   LACTIC ACID mmol/L 1 8     Results from last 7 days   Lab Units 12/05/19  1229   LIPASE u/L 236     Results from last 7 days   Lab Units 12/05/19  1412   CLARITY UA  Clear   COLOR UA  Yellow   SPEC GRAV UA  1 015   PH UA  6 0   GLUCOSE UA mg/dl Negative   KETONES UA mg/dl 15 (1+)*   BLOOD UA  Small*   PROTEIN UA mg/dl 30 (1+)*   NITRITE UA  Negative   BILIRUBIN UA  Negative UROBILINOGEN UA E U /dl 1 0   LEUKOCYTES UA  Negative   WBC UA /hpf 0-1*   RBC UA /hpf 1-2*   BACTERIA UA /hpf Occasional   EPITHELIAL CELLS WET PREP /hpf Occasional     ED Treatment:   Medication Administration from 12/05/2019 1139 to 12/05/2019 1648       Date/Time Order Dose Route Action Comments     12/05/2019 1246 sodium chloride 0 9 % bolus 1,000 mL 1,000 mL Intravenous New Bag      12/05/2019 1246 morphine (PF) 4 mg/mL injection 4 mg 4 mg Intravenous Given      12/05/2019 1246 ondansetron (ZOFRAN) injection 4 mg 4 mg Intravenous Given      12/05/2019 1334 iohexol (OMNIPAQUE) 350 MG/ML injection (MULTI-DOSE) 100 mL 100 mL Intravenous Given      12/05/2019 1447 ceFAZolin (ANCEF) IVPB (premix) 2,000 mg 2,000 mg Intravenous New Bag      12/05/2019 1630 metroNIDAZOLE (FLAGYL) IVPB (premix) 500 mg 500 mg Intravenous New Bag         Past Medical History:   Diagnosis Date    Acute ischemic left MCA stroke (HCC) 2/4/2016    Aortic stenosis     Gastric ulcer 2017    Hyperlipidemia     Hypertension     TIA (transient ischemic attack)      Present on Admission:   Hyperlipidemia   Nonrheumatic aortic valve stenosis   Hypertension      Admitting Diagnosis: Choledocholithiasis with acute cholecystitis [K80 42]  Pericardial effusion [I31 3]  Abdominal pain [R10 9]  Transaminitis [R74 0]  Age/Sex: 78 y o  male  Admission Orders: 12/5/2019 1549 Inpatient   Scheduled Medications:  Medications:  aspirin 81 mg Oral Daily   atorvastatin 40 mg Oral Daily With Dinner   cefazolin 2,000 mg Intravenous Q8H   co-enzyme Q-10 100 mg Oral Daily   cyanocobalamin 50 mcg Oral Daily   enoxaparin 40 mg Subcutaneous Daily   ferrous sulfate 325 mg Oral Daily With Breakfast   fish oil 1,000 mg Oral Daily   folic acid 872 mcg Oral Daily   indomethacin 100 mg Rectal Once   lisinopril 5 mg Oral Daily   magnesium oxide 400 mg Oral BID   metroNIDAZOLE 500 mg Intravenous Q8H   multivitamin-minerals 1 tablet Oral Daily   senna 1 tablet Oral Daily   thiamine 100 mg Oral BID     Continuous IV Infusions:  sodium chloride 75 mL/hr Intravenous Continuous     PRN Meds:  calcium carbonate 1,000 mg Oral Daily PRN   hydrALAZINE 5 mg Intravenous Q6H PRN   morphine injection - used x 1 (2010) 1 mg Intravenous Q4H PRN   morphine injection - used x 2 (1750, 0115) 2 mg Intravenous Q3H PRN   ondansetron 4 mg Intravenous Q6H PRN       IP CONSULT TO GASTROENTEROLOGY   telemetry    Network Utilization Review Department  [email or web address]  ATTENTION: Please call with any questions or concerns to [phone] and carefully listen to the prompts so that you are directed to the right person  All voicemails are confidential   Lizeth Winkler all requests for admission clinical reviews, approved or denied determinations and any other requests to dedicated fax number below belonging to the campus where the patient is receiving treatment   List of dedicated fax numbers for the Facilities:  [address] DENIALS (Administrative/Medical Necessity) [phone]   [address] (Maternity/NICU/Pediatrics) [phone]   [place] [facility] [phone]   [facility] [phone]   [place] [phone]   [facility] [phone]   Melody Hopkins [phone]   [place] [facility] [address] [facility] [address] [place] [address] [phone]

## 2019-12-06 NOTE — PLAN OF CARE
Problem: Potential for Falls  Goal: Patient will remain free of falls  Description  INTERVENTIONS:  - Assess patient frequently for physical needs  -  Identify cognitive and physical deficits and behaviors that affect risk of falls    -  [place] fall precautions as indicated by assessment   - Educate patient/family on patient safety including physical limitations  - Instruct patient to call for assistance with activity based on assessment  - Modify environment to reduce risk of injury  - Consider OT/PT consult to assist with strengthening/mobility  Outcome: Progressing     Problem: PAIN - ADULT  Goal: Verbalizes/displays adequate comfort level or baseline comfort level  Description  Interventions:  - Encourage patient to monitor pain and request assistance  - Assess pain using appropriate pain scale  - Administer analgesics based on type and severity of pain and evaluate response  - Implement non-pharmacological measures as appropriate and evaluate response  - Consider cultural and social influences on pain and pain management  - Notify physician/advanced practitioner if interventions unsuccessful or patient reports new pain  Outcome: Progressing

## 2019-12-06 NOTE — ASSESSMENT & PLAN NOTE
· Noted on CT A/P with associated choledocholithiasis   · D/W Surgery: Recommend continue with ERCP today, continue with abx, OP lap [place] in setting of Plavix use

## 2019-12-06 NOTE — ASSESSMENT & PLAN NOTE
· Patient currently on lisinopril-- continue   · Significantly elevated blood pressures felt to be secondary to pain, now improving though still above goal   · Pain control  · Hydralazine 5 mg IV q 6 p r n   SBP greater than 185

## 2019-12-06 NOTE — PROGRESS NOTES
Progress Note - Daniela Art 1940, 78 y o  male MRN: [id number]    Unit/Bed#: S -01 Encounter: [id number]    Primary Care Provider: Laura Laura MD   Date and time admitted to hospital: 12/5/2019 11:45 AM    * Acute cholecystitis  Assessment & Plan  · Noted on CT A/P with associated choledocholithiasis   · D/W Surgery: Recommend continue with ERCP today, continue with abx, OP lap clifton in setting of Plavix use     Choledocholithiasis  Assessment & Plan  · Noted on CT A/P; also with cystic duct dilation and obstruction  · D/W GI: ERCP with stent today    Transaminitis  Assessment & Plan  · 2/2 cholecystitis/choledocholithiasis   · Avoid hypotension  · Avoid hepatoxins  · Trend LFTs-- improving compared to yesterday    Pericardial effusion  Assessment & Plan  · Noted on Echo from July 2019  Unsure how chronic this is or if it has worsened  No recent SOB  · Will get repeat Echo while here     History of CVA (cerebrovascular accident)  Assessment & Plan  · Feb 2016-- R MCA 2/2 high-grade L carotid artery stenosis-- s/p L CEA  · Currently on DAPT x3 years-- per patient, his neurologist is considering taking him off of plavix indefinitely in January   · Will need to hold plavix prior to ERCP    Nonrheumatic aortic valve stenosis  Assessment & Plan  · Systolic ejection murmur noted on physical exam  · Will order repeat echo to eval this and pericardial effusion    Hyperlipidemia  Assessment & Plan  · Hold statin in setting of transaminitis    Hypertension  Assessment & Plan  · Patient currently on lisinopril-- continue   · Significantly elevated blood pressures felt to be secondary to pain, now improving though still above goal   · Pain control  · Hydralazine 5 mg IV q 6 p r n   SBP greater than 185      VTE Pharmacologic Prophylaxis:   Pharmacologic: Enoxaparin (Lovenox)  Mechanical VTE Prophylaxis in Place: Yes    Patient Centered Rounds: I have performed bedside rounds with nursing staff today     Discussions with Specialists or Other Care Team Provider: AMEYA, RN, GI, gen surg    Education and Discussions with Family / Patient: patient; wife at bedside     Time Spent for Care: 30 minutes  More than 50% of total time spent on counseling and coordination of care as described above  Current Length of Stay: 1 day(s)    Current Patient Status: Inpatient   Certification Statement: The patient will continue to require additional inpatient hospital stay due to ongoing management of elevated LFTs    Discharge Plan: d/c to home most likely tomorrow pending clinical improvement     Code Status: Level 1 - Full Code      Subjective:   Patient is drowsy  He reports that he has no pain  Objective:     Vitals:   Temp (24hrs), Av 5 °F (36 9 °C), Min:98 °F (36 7 °C), Max:99 2 °F (37 3 °C)    Temp:  [98 °F (36 7 °C)-99 2 °F (37 3 °C)] 99 2 °F (37 3 °C)  HR:  [66-90] 70  Resp:  [16-18] 16  BP: (149-205)/(55-91) 167/72  SpO2:  [90 %-96 %] 95 %  Body mass index is 35 98 kg/m²  Input and Output Summary (last 24 hours): Intake/Output Summary (Last 24 hours) at 2019 1100  Last data filed at 2019 1520  Gross per 24 hour   Intake 1050 ml   Output    Net 1050 ml       Physical Exam:     Physical Exam   Constitutional: He is oriented to person, place, and time  No distress  HENT:   Head: Normocephalic and atraumatic  Eyes: Pupils are equal, round, and reactive to light  EOM are normal    Neck: No JVD present  Cardiovascular: Normal rate and regular rhythm  Exam reveals no gallop and no friction rub  No murmur heard  Pulmonary/Chest: Effort normal and breath sounds normal  No stridor  No respiratory distress  He has no wheezes  Abdominal: Soft  Bowel sounds are normal  He exhibits no distension  There is no tenderness  There is no guarding  Musculoskeletal: Normal range of motion  He exhibits no edema  Neurological: He is alert and oriented to person, place, and time   He displays normal reflexes  No cranial nerve deficit  Coordination normal    Skin: Skin is warm and dry  He is not diaphoretic  Psychiatric: He has a normal mood and affect  His behavior is normal      Additional Data:     Labs:    Results from last 7 days   Lab Units 12/06/19  0511 12/05/19  1229   WBC Thousand/uL 11 05* 11 18*   HEMOGLOBIN g/dL 13 2 14 3   HEMATOCRIT % 40 2 43 0   PLATELETS Thousands/uL 98* 113*   NEUTROS PCT %  --  91*   LYMPHS PCT %  --  4*   MONOS PCT %  --  5   EOS PCT %  --  0     Results from last 7 days   Lab Units 12/06/19  0511   SODIUM mmol/L 136   POTASSIUM mmol/L 3 9   CHLORIDE mmol/L 102   CO2 mmol/L 25   BUN mg/dL 18   CREATININE mg/dL 0 90   ANION GAP mmol/L 9   CALCIUM mg/dL 8 4   ALBUMIN g/dL 3 1*   TOTAL BILIRUBIN mg/dL 2 79*   ALK PHOS U/L 204*   ALT U/L 171*   AST U/L 69*   GLUCOSE RANDOM mg/dL 117     Results from last 7 days   Lab Units 12/05/19  1229   INR  1 06             Results from last 7 days   Lab Units 12/05/19  1229   LACTIC ACID mmol/L 1 8           * I Have Reviewed All Lab Data Listed Above  * Additional Pertinent Lab Tests Reviewed:  Mitch 66 Admission Reviewed    Imaging:    Imaging Reports Reviewed Today Include: CT A/P  Imaging Personally Reviewed by Myself Includes:  CT A/P    Recent Cultures (last 7 days):           Last 24 Hours Medication List:     Current Facility-Administered Medications:  aspirin 81 mg Oral Daily Ana Rosa Rock PA-C    atorvastatin 40 mg Oral Daily With [employer] Ilia Rock PA-C    calcium carbonate 1,000 mg Oral Daily PRN Ana Rosa Rock PA-C    cefazolin 2,000 mg Intravenous Q8H Ana Rosa Rock PA-C Last Rate: 2,000 mg (12/06/19 6056)   co-enzyme Q-10 100 mg Oral Daily Ana Rosa Rock PA-C    cyanocobalamin 50 mcg Oral Daily Ana Rosa Rock PA-C    enoxaparin 40 mg Subcutaneous Daily Ana Rosa Rock PA-C    ferrous sulfate 325 mg Oral Daily With Breakfast Ana Rosa Rock PA-C fish oil 1,000 mg Oral Daily Ana Rosa Rock PA-C    folic acid 196 mcg Oral Daily Ana Rosa Rock PA-C    hydrALAZINE 5 mg Intravenous Q6H PRN Ana Rosa Rock PA-C    lisinopril 5 mg Oral Daily Ana Rosa Rock, KEENA    magnesium oxide 400 mg Oral BID Ana Rosa Rock PA-C    metroNIDAZOLE 500 mg Intravenous Q8H Ana Rosa Rock PA-C Last Rate: 500 mg (12/06/19 2816)   morphine injection 1 mg Intravenous Q4H PRN Ana Rosa Rock, KEENA    morphine injection 2 mg Intravenous Q3H PRN Ana Rosa Rock PA-C    multivitamin-minerals 1 tablet Oral Daily Ana Rosa Rock PA-C    ondansetron 4 mg Intravenous Q6H PRN Ana Rosa Rock PA-C    senna 1 tablet Oral Daily Ana Rosa Rock PA-C    sodium chloride 125 mL/hr Intravenous Continuous Alverna CaseKEENA Last Rate: 125 mL/hr (12/06/19 9339)   thiamine 100 mg Oral BID Ana Rosa Rock PA-C         Today, Patient Was Seen By: Luda Sullivan PA-C    ** Please Note: Dictation voice to text software may have been used in the creation of this document   **

## 2019-12-06 NOTE — PROGRESS NOTES
Progress Note -Surgery PA  Abdias Tellez 78 y o  male MRN: [id number]  Unit/Bed#: S -01 Encounter: [id number]      Assessment:  78year old male with choledocholithiasis, acute cholecystitis   Plan:  -continue ancef/flagyl  -trend labs  -ambulate  -pain control as needed  -NPO for ERCP as per GI  -hold anticoagulation  -timing of lap clifton will be discussed between patient and attending        Subjective/Objective     Subjective: Feels overall well, no pain  Objective:     /71 (BP Location: Left arm)   Pulse 80   Temp 99 °F (37 2 °C) (Temporal)   Resp 18   Ht 5' 11" (1 803 m)   Wt 117 kg (258 lb 6 1 oz)   SpO2 92%   BMI 36 04 kg/m²   I/O last 24 hours:   In: 1050 [IV Piggyback:1050]  Out: -         Intake/Output Summary (Last 24 hours) at 12/6/2019 0759  Last data filed at 12/5/2019 1520  Gross per 24 hour   Intake 1050 ml   Output    Net 1050 ml       Invasive Devices     Peripheral Intravenous Line            Peripheral IV 12/05/19 Right Arm less than 1 day                Physical Exam:  /71 (BP Location: Left arm)   Pulse 80   Temp 99 °F (37 2 °C) (Temporal)   Resp 18   Ht 5' 11" (1 803 m)   Wt 117 kg (258 lb 6 1 oz)   SpO2 92%   BMI 36 04 kg/m²   General appearance: alert and oriented, in no acute distress and alert  Lungs: clear to auscultation bilaterally  Heart: regular rate and rhythm, S1, S2 normal, no murmur, click, rub or gallop  Abdomen: soft, non-tender; bowel sounds normal; no masses,  no organomegaly      Current Facility-Administered Medications:     aspirin chewable tablet 81 mg, 81 mg, Oral, Daily, Ana Rosa Rock PA-C    atorvastatin (LIPITOR) tablet 40 mg, 40 mg, Oral, Daily With Dinner, Ana Rosa Rock PA-C    calcium carbonate (TUMS) chewable tablet 1,000 mg, 1,000 mg, Oral, Daily PRN, Ana Rosa Rock PA-C    ceFAZolin (ANCEF) IVPB (premix) 2,000 mg, 2,000 mg, Intravenous, Q8H, Ana Rosa Rock PA-C, Last Rate: 100 mL/hr at 12/06/19 7899, 2,000 mg at 12/06/19 1966    co-enzyme Q-10 capsule 100 mg, 100 mg, Oral, Daily, Ana Rosa Rock PA-C    cyanocobalamin (VITAMIN B-12) tablet 50 mcg, 50 mcg, Oral, Daily, Ana Rosa Rock PA-C    enoxaparin (LOVENOX) subcutaneous injection 40 mg, 40 mg, Subcutaneous, Daily, Ana Rosa Rock PA-C    ferrous sulfate tablet 325 mg, 325 mg, Oral, Daily With Breakfast, Ana Rosa Rock PA-C    fish oil capsule 1,000 mg, 1,000 mg, Oral, Daily, Ana Rosa Rock PA-C    folic acid (FOLVITE) tablet 400 mcg, 400 mcg, Oral, Daily, Ana Rosa Rock PA-C    hydrALAZINE (APRESOLINE) injection 5 mg, 5 mg, Intravenous, Q6H PRN, Ana Rosa Rock PA-C    lisinopril (ZESTRIL) tablet 5 mg, 5 mg, Oral, Daily, Ana Rosa Rock PA-C    magnesium oxide (MAG-OX) tablet 400 mg, 400 mg, Oral, BID, Ana Rosa Rock PA-C    metroNIDAZOLE (FLAGYL) IVPB (premix) 500 mg, 500 mg, Intravenous, Q8H, Ana Rosa Rock PA-C, Last Rate: 200 mL/hr at 12/05/19 2349, 500 mg at 12/05/19 2349    morphine injection 1 mg, 1 mg, Intravenous, Q4H PRN, Ana Rosa Rock PA-C, 1 mg at 12/05/19 2010    morphine injection 2 mg, 2 mg, Intravenous, Q3H PRN, Ana Rosa Rock PA-C, 2 mg at 12/06/19 0115    multivitamin-minerals (CENTRUM) tablet 1 tablet, 1 tablet, Oral, Daily, Ana Rosa Rock PA-C    ondansetron (ZOFRAN) injection 4 mg, 4 mg, Intravenous, Q6H PRN, Ana Rosa Rock PA-C    senna (SENOKOT) tablet 8 6 mg, 1 tablet, Oral, Daily, Ana Rosa Rock PA-C    sodium chloride 0 9 % infusion, 125 mL/hr, Intravenous, Continuous, Farideh Lopez PA-C, Last Rate: 125 mL/hr at 12/06/19 0120, 125 mL/hr at 12/06/19 0120    thiamine (VITAMIN B1) tablet 100 mg, 100 mg, Oral, BID, Ana Rosa Rock PA-C           Lab, Imaging and other studies:  CBC:   Lab Results   Component Value Date    WBC 11 05 (H) 12/06/2019    HGB 13 2 12/06/2019    HCT 40 2 12/06/2019    MCV 93 12/06/2019 PLT 98 (L) 12/06/2019    MCH 30 4 12/06/2019    MCHC 32 8 12/06/2019    RDW 13 3 12/06/2019    MPV 10 2 12/06/2019    NRBC 0 12/05/2019   , CMP:   Lab Results   Component Value Date    SODIUM 136 12/06/2019    K 3 9 12/06/2019     12/06/2019    CO2 25 12/06/2019    BUN 18 12/06/2019    CREATININE 0 90 12/06/2019    CALCIUM 8 4 12/06/2019    AST 69 (H) 12/06/2019     (H) 12/06/2019    ALKPHOS 204 (H) 12/06/2019    EGFR 80 12/06/2019         VTE Pharmacologic Prophylaxis:   VTE Mechanical Prophylaxis: sequential compression device     Rounds performed with nursing

## 2019-12-06 NOTE — ASSESSMENT & PLAN NOTE
· 2/2 cholecystitis/choledocholithiasis   · Avoid hypotension  · Avoid hepatoxins  · Trend LFTs-- improving compared to yesterday

## 2019-12-06 NOTE — ASSESSMENT & PLAN NOTE
· Feb 2016-- R MCA 2/2 high-grade L carotid artery stenosis-- s/p L CEA  · Currently on DAPT x3 years-- per patient, his neurologist is considering taking him off of plavix indefinitely in January   · Will need to hold plavix prior to ERCP

## 2019-12-06 NOTE — ANESTHESIA PROCEDURE NOTES
Arterial Line Insertion  Performed by: Juvencio Rojas CRNA  Authorized by: Juvencio Rojas CRNA   Consent: Verbal consent obtained  Risks and benefits: risks, benefits and alternatives were discussed  Consent given by: patient  Patient understanding: patient states understanding of the procedure being performed  Patient consent: the patient's understanding of the procedure matches consent given  Procedure consent: procedure consent matches procedure scheduled  Relevant documents: relevant documents present and verified  Test results: test results available and properly labeled  Site marked: the operative site was marked  Radiology Images: No Radiology Images displayed or report reviewed  Patient identity confirmed: verbally with patient and arm band  Time out: Immediately prior to procedure a "time out" was called to verify the correct patient, procedure, equipment, support staff and site/side marked as required  Preparation: Patient was prepped and draped in the usual sterile fashion  Indications: hemodynamic monitoring  Orientation:  Left  Location: radial arterylidocaine (XYLOCAINE) 1% local infiltration, 0 5 mL  Sedation:  Patient sedated: no    Procedure Details:  Artur's test normal: yes  Needle gauge: 20  Number of attempts: 1    Post-procedure:  Post-procedure: dressing applied  Waveform: good waveform  Post-procedure CNS: normal    Comments: Patient tolerated procedure well

## 2019-12-06 NOTE — ANESTHESIA PREPROCEDURE EVALUATION
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,   Comment: LEFT VENTRICLE:  Systolic function was normal  Ejection fraction was estimated to be 56 %  There were no regional wall motion abnormalities  Wall thickness was moderately increased  There was moderate concentric hypertrophy  Features were consistent with a pseudonormal left ventricular filling pattern, with concomitant abnormal relaxation and increased filling pressure (grade 2 diastolic dysfunction)      LEFT ATRIUM:  The atrium was moderately dilated      RIGHT ATRIUM:  The atrium was mildly dilated      MITRAL VALVE:  There was marked annular calcification  There was trace regurgitation      AORTIC VALVE:  The valve was trileaflet  Leaflets exhibited moderately to markedly increased thickness, moderate calcification, and markedly reduced cuspal separation  Transaortic velocity was increased due to valvular stenosis  There was moderate stenosis  Although measurements indicated moderate degree of stenosis, the valve appeared more severely stenotic on direct visualization, Clinical correlation is advised  There was mild to moderate regurgitation  Valve mean gradient was 23 mmHg  Estimated aortic valve area (by VTI) was 1 32 cmï¾²  Estimated aortic valve area (by Vmax) was 1 25 cmï¾²  Estimated aortic valve area (by Vmean) was 1 28 cmï¾²     TRICUSPID VALVE:  There was mild regurgitation      AORTA:  There was mild dilatation of the ascending aorta    The ascending aortic AP dimension was 40 mm ,  Pulmonary  Negative pulmonary ROS        GI/Hepatic    PUD,        Negative  ROS        Endo/Other  Negative endo/other ROS   Obesity    GYN  Negative gynecology ROS Hematology  Negative hematology ROS Anemia chronic blood loss anemia,    Comment: Gastric ulcer resolved Musculoskeletal  Negative musculoskeletal ROS        Neurology  Negative neurology ROS   TIA, No CVA ,   Comment: Carotid endarterectomy 2017 Psychology   Negative psychology ROS              Physical Exam    Airway    Mallampati score: II  TM Distance: >3 FB  Neck ROM: full     Dental       Cardiovascular  Comment: Negative ROS,     Pulmonary  Breath sounds clear to auscultation,     Other Findings        Anesthesia Plan  ASA Score- 3     Anesthesia Type- general with ASA Monitors  Additional Monitors: arterial line  Airway Plan: ETT  Plan Factors-    Induction- intravenous  Postoperative Plan- Plan for postoperative opioid use  Informed Consent- Anesthetic plan and risks discussed with patient  I personally reviewed this patient with the CRNA  Discussed and agreed on the Anesthesia Plan with the CRNA  Yordy Allison

## 2019-12-07 PROBLEM — I27.20 PULMONARY HTN (HCC): Status: ACTIVE | Noted: 2019-01-01

## 2019-12-07 NOTE — ASSESSMENT & PLAN NOTE
· Feb 2016-- R MCA 2/2 high-grade L carotid artery stenosis-- s/p L CEA  · Currently on DAPT x3 years-- per patient, his neurologist is considering taking him off of plavix indefinitely in January

## 2019-12-07 NOTE — PROGRESS NOTES
Progress Note - Nereyda Gan 78 y o  male MRN: [id number]    Unit/Bed#: S -01 Encounter: [id number]    Assessment and Plan:   Principal Problem:    Acute cholecystitis  Active Problems:    Hypertension    Hyperlipidemia    Nonrheumatic aortic valve stenosis    History of CVA (cerebrovascular accident)    Choledocholithiasis    Transaminitis    Pericardial effusion    #1  Choledocholithiasis: s/p ERCP yesterday with stent placement as sphincterotomy could not be performed due to being on Plavix  LFTs did increase slightly today  Has some gas pain but overall improving    -Plan for outpatient ERCP repeated once Plavix is held for 5 days in about 4-6 weeks for stent removal, sphincterotomy, and stone extraction  -diet as tolerated  -Follow up LFTs tomorrow  -dicussed with patient's wife and daughter at bedside    #2  Acute cholecystitis  -management per surgical team    ----------------------------------------------------------------------------------------------------------------    Subjective:     Has some gas pains, no pain to palpation, no N/V, tolerated diet last night  No BM    Objective:     Vitals: Blood pressure 128/60, pulse 81, temperature 98 4 °F (36 9 °C), temperature source Oral, resp  rate 18, height 5' 11" (1 803 m), weight 117 kg (258 lb), SpO2 94 %  ,Body mass index is 35 98 kg/m²        Intake/Output Summary (Last 24 hours) at 12/7/2019 1027  Last data filed at 12/7/2019 0114  Gross per 24 hour   Intake 1350 ml   Output    Net 1350 ml       Physical Exam:     General Appearance: Alert, appears stated age and cooperative  Lungs: Clear to auscultation bilaterally, no rales or rhonchi, no labored breathing/accessory muscle use  Heart: Regular rate and rhythm, S1, S2 normal, no murmur, click, rub or gallop  Abdomen: Soft, non-tender, obese, non-distended; bowel sounds normal; no masses or no organomegaly  Extremities: No cyanosis, clubbing, or edema    Invasive Devices     Peripheral Intravenous Line            Peripheral IV 12/05/19 Right Arm 1 day                Lab Results:  Results from last 7 days   Lab Units 12/07/19  0451  12/05/19  1229   WBC Thousand/uL 8 87   < > 11 18*   HEMOGLOBIN g/dL 12 5   < > 14 3   HEMATOCRIT % 37 9   < > 43 0   PLATELETS Thousands/uL 95*   < > 113*   NEUTROS PCT %  --   --  91*   LYMPHS PCT %  --   --  4*   MONOS PCT %  --   --  5   EOS PCT %  --   --  0    < > = values in this interval not displayed  Results from last 7 days   Lab Units 12/07/19  0451   POTASSIUM mmol/L 3 6   CHLORIDE mmol/L 104   CO2 mmol/L 26   BUN mg/dL 24   CREATININE mg/dL 1 13   CALCIUM mg/dL 8 0*   ALK PHOS U/L 223*   ALT U/L 92*   AST U/L 66*     Results from last 7 days   Lab Units 12/05/19  1229   INR  1 06     Results from last 7 days   Lab Units 12/05/19  1229   LIPASE u/L 236       Imaging Studies: I have personally reviewed pertinent imaging studies  Fl Ercp Biliary Only    Result Date: 12/6/2019  Impression: Fluoroscopic guidance provided for surgical procedure  Please refer to the separate procedure notes for additional details  Workstation performed: [id number]     Ct Abdomen Pelvis With Contrast    Result Date: 12/5/2019  Impression: Cholelithiasis and choledocholithiasis with findings concerning for acute cholecystitis and mild extrahepatic ductal dilatation  There appear to be punctate 2 mm calculi within the cystic duct and the common bile duct as discussed with mild proximal CBD dilatation of the millimeters  GI consultation is recommended  Mild inflammatory changes about the second portion of duodenum likely representing secondary duodenitis  Hepatosplenomegaly  Hepatic and renal cysts  GI consultation is recommended  Pericardial effusion    I personally discussed this study with Christine Weeks on 12/5/2019 at 2:26 PM  Workstation performed: [id number]

## 2019-12-07 NOTE — ASSESSMENT & PLAN NOTE
· Incidentally found on limited Echo performed yesterday  · Asymptomatic  · OP f/u with cardiology for further evaluation

## 2019-12-07 NOTE — PLAN OF CARE
Problem: Potential for Falls  Goal: Patient will remain free of falls  Description  INTERVENTIONS:  - Assess patient frequently for physical needs  -  Identify cognitive and physical deficits and behaviors that affect risk of falls    -  Colt fall precautions as indicated by assessment   - Educate patient/family on patient safety including physical limitations  - Instruct patient to call for assistance with activity based on assessment  - Modify environment to reduce risk of injury  - Consider OT/PT consult to assist with strengthening/mobility  Outcome: Progressing

## 2019-12-07 NOTE — ASSESSMENT & PLAN NOTE
· 2/2 cholecystitis/choledocholithiasis   Slightly elevated today compared to yesterday most likely 2/2 manipulation of biliary tract-- will repeat tomorrow to evaluate improvement of Tbili   · Avoid hypotension  · Avoid hepatoxins

## 2019-12-07 NOTE — ASSESSMENT & PLAN NOTE
· Noted on CT A/P; also with cystic duct dilation and obstruction  · D/W GI: S/P ERCP with unsuccessful stone removal-- stent in place   Will need repeat ERCP with stent removal, sphincterotomy, and stone extraction in 4-6 weeks after being off plavix for 5-7 days  · Advance diet to low fat

## 2019-12-07 NOTE — PROGRESS NOTES
Progress Note - Brady Davis 1940, 78 y o  male MRN: [id number]    Unit/Bed#: S -01 Encounter: [id number]    Primary Care Provider: Cynthia Emerson MD   Date and time admitted to hospital: 12/5/2019 11:45 AM    * Acute cholecystitis  Assessment & Plan  · Noted on CT A/P with associated choledocholithiasis   · D/W Surgery: TBili did elevated post ERCP-- will repeat labs this AM, consider PCT if Tbili does not resolve  Will need interval ccy  Continue abx ancef/flagyl    Choledocholithiasis  Assessment & Plan  · Noted on CT A/P; also with cystic duct dilation and obstruction  · D/W GI: S/P ERCP with unsuccessful stone removal-- stent in place  Will need repeat ERCP with stent removal, sphincterotomy, and stone extraction in 4-6 weeks after being off plavix for 5-7 days  · Advance diet to low fat     Transaminitis  Assessment & Plan  · 2/2 cholecystitis/choledocholithiasis  Slightly elevated today compared to yesterday most likely 2/2 manipulation of biliary tract-- will repeat tomorrow to evaluate improvement of Tbili   · Avoid hypotension  · Avoid hepatoxins    Pulmonary HTN ([address] )  Assessment & Plan  · Incidentally found on limited Echo performed yesterday  · Asymptomatic  · OP f/u with cardiology for further evaluation    Pericardial effusion  Assessment & Plan  · Noted on Echo from July 2019  Unsure how chronic this is or if it has worsened  No recent SOB    · Repeat echo does show stability of small pericardial effusion    History of CVA (cerebrovascular accident)  Assessment & Plan  · Feb 2016-- R MCA 2/2 high-grade L carotid artery stenosis-- s/p L CEA  · Currently on DAPT x3 years-- per patient, his neurologist is considering taking him off of plavix indefinitely in January     Nonrheumatic aortic valve stenosis  Assessment & Plan  · Systolic ejection murmur noted on physical exam  · Echo shows stable mod to severe AS    Hyperlipidemia  Assessment & Plan  · Hold statin in setting of transaminitis    Hypertension  Assessment & Plan  · Patient currently on lisinopril-- continue   · Significantly elevated blood pressures felt to be secondary to pain, now improving though still above goal   · Pain control  · Hydralazine 5 mg IV q 6 p r n  SBP greater than 185      VTE Pharmacologic Prophylaxis:   Pharmacologic: Enoxaparin (Lovenox)  Mechanical VTE Prophylaxis in Place: Yes    Patient Centered Rounds: I have performed bedside rounds with nursing staff today  Discussions with Specialists or Other Care Team Provider: CM, RN, GI, gen surg    Education and Discussions with Family / Patient: patient; wife and daughter at bedside     Time Spent for Care: 30 minutes  More than 50% of total time spent on counseling and coordination of care as described above  Current Length of Stay: 2 day(s)    Current Patient Status: Inpatient   Certification Statement: The patient will continue to require additional inpatient hospital stay due to ongoing management of LFTs    Discharge Plan: hopeful d/c tomorrow pending improvement of LFTs    Code Status: Level 1 - Full Code      Subjective:   Patient reports having discomfort overnight-- felt very gassy  No CP, SOB  Feels better this AM  Tolerated breakfast      Objective:     Vitals:   Temp (24hrs), Av 9 °F (36 6 °C), Min:97 5 °F (36 4 °C), Max:98 7 °F (37 1 °C)    Temp:  [97 5 °F (36 4 °C)-98 7 °F (37 1 °C)] 98 4 °F (36 9 °C)  HR:  [60-91] 81  Resp:  [16-20] 18  BP: (119-162)/(57-78) 128/60  SpO2:  [92 %-99 %] 94 %  Body mass index is 35 98 kg/m²  Input and Output Summary (last 24 hours): Intake/Output Summary (Last 24 hours) at 2019 1208  Last data filed at 2019 0114  Gross per 24 hour   Intake 1350 ml   Output    Net 1350 ml       Physical Exam:     Physical Exam   Constitutional: He is oriented to person, place, and time  No distress  HENT:   Head: Normocephalic and atraumatic  Eyes: Pupils are equal, round, and reactive to light  EOM are normal    Neck: No JVD present  Cardiovascular: Normal rate and regular rhythm  Exam reveals no gallop and no friction rub  Murmur heard  Pulmonary/Chest: Effort normal and breath sounds normal  No stridor  No respiratory distress  He has no wheezes  Abdominal: Soft  Bowel sounds are normal  He exhibits no distension  There is no tenderness  There is no guarding  Musculoskeletal: Normal range of motion  He exhibits no edema  Neurological: He is alert and oriented to person, place, and time  He displays normal reflexes  No cranial nerve deficit  Coordination normal    Skin: Skin is warm and dry  He is not diaphoretic  Psychiatric: He has a normal mood and affect  His behavior is normal        Additional Data:     Labs:    Results from last 7 days   Lab Units 12/07/19  0451  12/05/19  1229   WBC Thousand/uL 8 87   < > 11 18*   HEMOGLOBIN g/dL 12 5   < > 14 3   HEMATOCRIT % 37 9   < > 43 0   PLATELETS Thousands/uL 95*   < > 113*   NEUTROS PCT %  --   --  91*   LYMPHS PCT %  --   --  4*   MONOS PCT %  --   --  5   EOS PCT %  --   --  0    < > = values in this interval not displayed  Results from last 7 days   Lab Units 12/07/19  0451   SODIUM mmol/L 138   POTASSIUM mmol/L 3 6   CHLORIDE mmol/L 104   CO2 mmol/L 26   BUN mg/dL 24   CREATININE mg/dL 1 13   ANION GAP mmol/L 8   CALCIUM mg/dL 8 0*   ALBUMIN g/dL 2 7*   TOTAL BILIRUBIN mg/dL 6 23*   ALK PHOS U/L 223*   ALT U/L 92*   AST U/L 66*   GLUCOSE RANDOM mg/dL 89     Results from last 7 days   Lab Units 12/05/19  1229   INR  1 06             Results from last 7 days   Lab Units 12/05/19  1229   LACTIC ACID mmol/L 1 8           * I Have Reviewed All Lab Data Listed Above  * Additional Pertinent Lab Tests Reviewed:  [facility] [facility] Admission Reviewed    Imaging:    Imaging Reports Reviewed Today Include: CT A/P; ERCP report  Imaging Personally Reviewed by Myself Includes:      Recent Cultures (last 7 days):           Last 24 Hours Medication List:     Current Facility-Administered Medications:  aspirin 81 mg Oral Daily Talat Camarena, MD    atorvastatin 40 mg Oral Daily With Dinner Talat Camarena MD    calcium carbonate 1,000 mg Oral Daily PRN Talat Camarena MD    cefazolin 2,000 mg Intravenous Q8H Talat Camarena MD Last Rate: 2,000 mg (12/07/19 9621)   co-enzyme Q-10 100 mg Oral Daily Talat Camarena, MD    cyanocobalamin 50 mcg Oral Daily Desireeire Migue, MD    enoxaparin 40 mg Subcutaneous Daily Desireeire Migue, MD    ferrous sulfate 325 mg Oral Daily With Breakfast Desireeire Migue, MD    fish oil 1,000 mg Oral Daily Talat Camarena, MD    folic acid 613 mcg Oral Daily Talat Camarena, MD    hydrALAZINE 5 mg Intravenous Q6H PRN Desireeire Gravely, MD    indomethacin 100 mg Rectal Once Desireeire Migue, MD    lisinopril 5 mg Oral Daily Talat Camarena, MD    magnesium oxide 400 mg Oral BID Talat Camarena MD    metroNIDAZOLE 500 mg Intravenous Q8H Talat Camarena MD Last Rate: 500 mg (12/07/19 5679)   morphine injection 1 mg Intravenous Q4H PRN Desireeire GravMD patrick    morphine injection 2 mg Intravenous Q3H PRN Desireeire Migue, MD    multivitamin-minerals 1 tablet Oral Daily Talat Camarena MD    ondansetron 4 mg Intravenous Q6H PRN Desireeire Migue, MD    senna 1 tablet Oral Daily Desireeire MD Migue    sodium chloride 75 mL/hr Intravenous Continuous Ana Rosajocelyn Rock PA-C Last Rate: 75 mL/hr (12/07/19 0114)   thiamine 100 mg Oral BID Talat Camarena MD         Today, Patient Was Seen By: Debora Camacho PA-C    ** Please Note: Dictation voice to text software may have been used in the creation of this document   **

## 2019-12-07 NOTE — PROGRESS NOTES
Progress Note - General Surgery   Dewane Gin 78 y o  male MRN: [id number]  Unit/Bed#: S -01 Encounter: [id number]    Assessment:  78 y o  M w/ choledocholithiasis     S/p lap clifton w/o sphincterotomy and failure to retrieve CBD stone; stent placed  Tbili 6    Plan:  Diet as tolerated  Monitor Tbili; if failure to resolve may need PTC drainage  Will need repeat ERCP per GI  Interval lap clifton as outpatient     Subjective/Objective   Chief Complaint:     Subjective:     Objective:     Blood pressure 128/60, pulse 81, temperature 98 4 °F (36 9 °C), temperature source Oral, resp  rate 18, height 5' 11" (1 803 m), weight 117 kg (258 lb), SpO2 94 %  ,Body mass index is 35 98 kg/m²  Intake/Output Summary (Last 24 hours) at 12/7/2019 0805  Last data filed at 12/7/2019 0114  Gross per 24 hour   Intake 1350 ml   Output    Net 1350 ml       Invasive Devices     Peripheral Intravenous Line            Peripheral IV 12/05/19 Right Arm 1 day                Physical Exam:   NAD  CV RRR  Pulm noraml effort  ABd soft, NTND    Lab, Imaging and other studies:  I have personally reviewed pertinent lab results    , CBC:   Lab Results   Component Value Date    WBC 8 87 12/07/2019    HGB 12 5 12/07/2019    HCT 37 9 12/07/2019    MCV 93 12/07/2019    PLT 95 (L) 12/07/2019    MCH 30 7 12/07/2019    MCHC 33 0 12/07/2019    RDW 13 3 12/07/2019    MPV 10 4 12/07/2019   , CMP:   Lab Results   Component Value Date    SODIUM 138 12/07/2019    K 3 6 12/07/2019     12/07/2019    CO2 26 12/07/2019    BUN 24 12/07/2019    CREATININE 1 13 12/07/2019    CALCIUM 8 0 (L) 12/07/2019    AST 66 (H) 12/07/2019    ALT 92 (H) 12/07/2019    ALKPHOS 223 (H) 12/07/2019    EGFR 61 12/07/2019     VTE Pharmacologic Prophylaxis: Heparin  VTE Mechanical Prophylaxis: sequential compression device

## 2019-12-07 NOTE — ASSESSMENT & PLAN NOTE
· Noted on CT A/P with associated choledocholithiasis   · D/W Surgery: TBili did elevated post ERCP-- will repeat labs this AM, consider PCT if Tbili does not resolve  Will need interval ccy   Continue abx ancef/flagyl

## 2019-12-07 NOTE — ASSESSMENT & PLAN NOTE
· Noted on Echo from July 2019  Unsure how chronic this is or if it has worsened  No recent SOB    · Repeat echo does show stability of small pericardial effusion

## 2019-12-08 NOTE — DISCHARGE INSTRUCTIONS
Cholecystitis   WHAT YOU SHOULD KNOW:   Cholecystitis is inflammation of your gallbladder  Your gallbladder stores bile, which helps break down the fat that you eat  It also helps remove certain chemicals from your body  You may have a sudden, severe attack (acute cholecystitis) or several mild attacks (chronic cholecystitis)  AFTER YOU LEAVE:   Medicines:   · Pain medicine: You may need medicine to take away or decrease pain  ¨ Learn how to take your medicine  Ask what medicine and how much you should take  Be sure you know how, when, and how often to take it  ¨ Do not wait until the pain is severe before you take your medicine  Tell caregivers if your pain does not decrease  ¨ Pain medicine can make you dizzy or sleepy  Prevent falls by calling someone when you get out of bed or if you need help  · Antibiotics: This medicine is given to fight or prevent an infection caused by bacteria  Always take your antibiotics exactly as ordered by your healthcare provider  Do not stop taking your medicine unless directed by your healthcare provider  Never save antibiotics or take leftover antibiotics that were given to you for another illness  · NSAIDs  help decrease swelling and pain or fever  This medicine is available with or without a doctor's order  NSAIDs can cause stomach bleeding or kidney problems in certain people  If you take blood thinner medicine, always ask your healthcare provider if NSAIDs are safe for you  Always read the medicine label and follow directions  · Take your medicine as directed  Call your healthcare provider if you think your medicine is not helping or if you have side effects  Tell him if you are allergic to any medicine  Keep a list of the medicines, vitamins, and herbs you take  Include the amounts, and when and why you take them  Bring the list or the pill bottles to follow-up visits  Carry your medicine list with you in case of an emergency    Follow up with your healthcare provider as directed:  Write down your questions so you remember to ask them during your visits  Eat a variety of healthy foods: This may help you have more energy and heal faster  Healthy foods include fruit, vegetables, whole-grain breads, low-fat dairy products, beans, lean meat, and fish  Ask if you need to be on a special diet  Contact your healthcare provider if:   · You have a fever or chills  · You have nausea or vomiting  · You have decreased appetite  · You have pain when you urinate  · Your skin or eyes turn yellow  · You have questions or concerns about your condition or care  Seek care immediately or call 911 if:   · You have severe pain in your abdomen  · You have chest pain or trouble breathing  · You urinate less than usual   © 2014 [employer] is for End User's use only and may not be sold, redistributed or otherwise used for commercial purposes  All illustrations and images included in CareNotes® are the copyrighted property of [employer]  or [employer]  The above information is an  only  It is not intended as medical advice for individual conditions or treatments  Talk to your doctor, nurse or pharmacist before following any medical regimen to see if it is safe and effective for you

## 2019-12-08 NOTE — ASSESSMENT & PLAN NOTE
· Patient currently on lisinopril-- continue   · Significantly elevated blood pressures felt to be secondary to pain, now improving though still above goal   · Pain control-- will send home with Rx for tramadol PRN

## 2019-12-08 NOTE — ASSESSMENT & PLAN NOTE
· 2/2 cholecystitis/choledocholithiasis   Trending down post ERCP  · Avoid hypotension  · Avoid hepatoxins

## 2019-12-08 NOTE — ASSESSMENT & PLAN NOTE
· Noted on CT A/P; also with cystic duct dilation and obstruction  · D/W GI: S/P ERCP with unsuccessful stone removal-- stent in place   Will need repeat ERCP with stent removal, sphincterotomy, and stone extraction in 4-6 weeks after being off plavix for 5-7 days  · Low fat diet on d/c

## 2019-12-08 NOTE — INCIDENTAL FINDINGS
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

## 2019-12-08 NOTE — PROGRESS NOTES
Progress Note - General Surgery   Yamil Maza 78 y o  male MRN: [id number]  Unit/Bed#: S -01 Encounter: [id number]    Assessment:  78 y o  M w/ choledocholithiasis     S/p lap clifton w/o sphincterotomy and failure to retrieve CBD stone; stent placed  Tbili improved to 2    Plan:  Diet as tolerated  Will need repeat ERCP per GI as outpatient  Interval lap clifton as outpatient     Subjective/Objective   Chief Complaint:     Subjective:     Objective:     Blood pressure 141/71, pulse 90, temperature 98 3 °F (36 8 °C), temperature source Temporal, resp  rate 18, height 5' 11" (1 803 m), weight 117 kg (258 lb), SpO2 93 %  ,Body mass index is 35 98 kg/m²  No intake or output data in the 24 hours ending 12/08/19 0904    Invasive Devices     Peripheral Intravenous Line            Peripheral IV 12/05/19 Right Arm 2 days                Physical Exam:   NAD  CV RRR  Pulm noraml effort  ABd soft, NTND    Lab, Imaging and other studies:  I have personally reviewed pertinent lab results    , CBC:   No results found for: WBC, HGB, HCT, MCV, PLT, ADJUSTEDWBC, MCH, MCHC, RDW, MPV, NRBC, CMP:   Lab Results   Component Value Date    SODIUM 140 12/08/2019    K 3 2 (L) 12/08/2019     12/08/2019    CO2 25 12/08/2019    BUN 28 (H) 12/08/2019    CREATININE 1 02 12/08/2019    CALCIUM 8 0 (L) 12/08/2019    AST 54 (H) 12/08/2019    ALT 50 12/08/2019    ALKPHOS 208 (H) 12/08/2019    EGFR 69 12/08/2019     VTE Pharmacologic Prophylaxis: Heparin  VTE Mechanical Prophylaxis: sequential compression device

## 2019-12-08 NOTE — ASSESSMENT & PLAN NOTE
· Noted on CT A/P with associated choledocholithiasis   · D/W Surgery: TBili did elevate post ERCP, now trending down-- suspect 2/2 manipulation of biliary tract-- Will need interval ccy   transition to keflex/flagyl to complete a total tx course of 7 days

## 2019-12-08 NOTE — ASSESSMENT & PLAN NOTE
· Incidentally found on limited Echo performed yesterday  · Asymptomatic  · OP f/u with cardiology for further evaluation-- Sees Dr Rj Antony

## 2019-12-08 NOTE — DISCHARGE SUMMARY
Discharge- Daniela Art 1940, 78 y o  male MRN: [id number]    Unit/Bed#: S -01 Encounter: [id number]    Primary Care Provider: Laura Laura MD   Date and time admitted to hospital: 12/5/2019 11:45 AM    * Acute cholecystitis  Assessment & Plan  · Noted on CT A/P with associated choledocholithiasis   · D/W Surgery: TBili did elevate post ERCP, now trending down-- suspect 2/2 manipulation of biliary tract-- Will need interval ccy  transition to keflex/flagyl to complete a total tx course of 7 days    Choledocholithiasis  Assessment & Plan  · Noted on CT A/P; also with cystic duct dilation and obstruction  · D/W GI: S/P ERCP with unsuccessful stone removal-- stent in place  Will need repeat ERCP with stent removal, sphincterotomy, and stone extraction in 4-6 weeks after being off plavix for 5-7 days  · Low fat diet on d/c    Transaminitis  Assessment & Plan  · 2/2 cholecystitis/choledocholithiasis  Trending down post ERCP  · Avoid hypotension  · Avoid hepatoxins    Pulmonary HTN (HCC)  Assessment & Plan  · Incidentally found on limited Echo performed yesterday  · Asymptomatic  · OP f/u with cardiology for further evaluation-- Sees Dr Sarah Renae    Pericardial effusion  Assessment & Plan  · Noted on Echo from July 2019  Unsure how chronic this is or if it has worsened  No recent SOB    · Repeat echo does show stability of small pericardial effusion    History of CVA (cerebrovascular accident)  Assessment & Plan  · Feb 2016-- R MCA 2/2 high-grade L carotid artery stenosis-- s/p L CEA  · Currently on DAPT x3 years-- per patient, his neurologist is considering taking him off of plavix indefinitely in January     Nonrheumatic aortic valve stenosis  Assessment & Plan  · Systolic ejection murmur noted on physical exam  · Echo shows stable mod to severe AS    Hyperlipidemia  Assessment & Plan  · Hold statin in setting of transaminitis    Hypertension  Assessment & Plan  · Patient currently on lisinopril-- continue   · Significantly elevated blood pressures felt to be secondary to pain, now improving though still above goal   · Pain control-- will send home with Rx for tramadol PRN         Discharging Physician / Practitioner: Ag Davis PA-C  PCP: Gila Kaiser MD  Admission Date:   Admission Orders (From admission, onward)     Ordered        12/05/19 1549  Inpatient Admission (expected length of stay for this patient Order details is greater than two midnights)  Once                   Discharge Date: 12/08/19    Resolved Problems  Date Reviewed: 12/8/2019    None          Consultations During Hospital Stay:  · General Surgery: Dr Alverda Cooks  · GI: Dr Analia Duncan     Procedures Performed:   · CT A/P w contrast  · ERCP  · Echo    Significant Findings / Test Results:   · CT A/P w contrast  · Cholelithiasis and choledocholithiasis with findings concerning for acute cholecystitis and mild extrahepatic ductal dilatation  There appear to be punctate 2 mm calculi within the cystic duct and the common bile duct as discussed with mild proximal CBD dilatation of the millimeters  GI consultation is recommended  Mild inflammatory changes about the second portion of duodenum likely representing secondary duodenitis  Hepatosplenomegaly  Hepatic and renal cysts  Pericardial effusion  · ERCP  · The esophagus and stomach appeared normal  Mild duodenitis noted in the bulb in 2nd portion  Limited endoscopic evaluation of upper GI tract  · Deeply cannulated the common bile duct after 2 attempts using a traction sphincterotome  Used 260 cm x 0 035 in straight guidewire  Cannulation was not difficult  Injected contrast  A 4 mm her aunt filling defects noted on cholangiogram    Sphincterotomy/shphincteroplasty could not be performed all the patient is on Plavix  Attempted to sweep the duct with a 9-12 mm stone extraction balloon  The stone was not retrieved    · Placed plastic, duodenal bend stent with length of 7 cm and diameter of 10 Fr in the major papilla  · Echo  · EF 55%, mod concentric hypertrophy; mod to severe aortic stenosis; mod to severe pulmonary HTN  Incidental Findings:   ·  Echo     Test Results Pending at Discharge (will require follow up):   · none     Outpatient Tests Requested:  · Repeat ERCP with stent removal/sphincterotomy/stone extraction in 4-6 weeks  · Cholecystectomy     Complications:  none    Reason for Admission: abdominal pain    Hospital Course:     Tramaine Butcher is a 78 y o  male patient who originally presented to the hospital on 12/5/2019 due to intermittently abdominal pain x3 days  Patient's pain was located on the R side and was worse after eating  Prior to arrival, patient's pain had been constant and he was reportedly yellow-tinged per her daughter  He was found to have transaminitis 2/2 biliary obstruction of the CBD and cystic duct; he was also found to have acute cholecystitis  Patient was systemically well  He was started on IV ancef and IV flagyl and was evaluated by both general surgery and GI  General surgery opted to continued IV abx for cholecystitis, and recommended ERCP by GI prior to cholecystectomy  GI performed ERCP the day following admission on 12/6-- sphincterotomy was not able to be performed as patient has been on plavix for a hx of CVA 2/2 L carotid artery stenosis  A stent was placed and stone was not extracted  Post ERCP patient had elevated in T bili from 2 to 6  It was recommended by both GI and general surgery to keep the patient in the hospital for an additional night to ensure that T bili trended down to avoid PCT to drain the gall bladder  Fortunately, this lab trended down and patient was considered stable for d/c to home  He will complete 1 week of abx total-- will transition to keflex/flagyl  He will f/u with Dr Flavio Guadalupe and Dr July Garza for outpatient repeat ERCP and cholecystectomy respectively when plavix can be held       Patient was also noted to have a pericardial effusion on CT A/P  This was noted on a prior echo from 7/2019, but in event surgery were to happen, a repeat echo was ordered  This showed stability of pericardial effusion, but also revealed moderate to severe pulmonary HTN that was not previously noted  Patient does not report sx  Given patient will need surgery as an OP, I recommended that he follow up with his primary cardiologist Dr Justo Draper for further evaluation  Please see above list of diagnoses and related plan for additional information  Condition at Discharge: fair     Discharge Day Visit / Exam:     Subjective:  Patient reports feeling well today  He still admits to constipation, but we discussed how he has not been eating regularly and this could be contributing to constipation  He denies nausea/vomiting  Abdominal pain is significantly improved  Vitals: Blood Pressure: 141/71 (12/08/19 0736)  Pulse: 90 (12/08/19 0736)  Temperature: 98 3 °F (36 8 °C) (12/08/19 0736)  Temp Source: Temporal (12/08/19 0736)  Respirations: 18 (12/08/19 0736)  Height: 5' 11" (180 3 cm) (12/06/19 1040)  Weight - Scale: 117 kg (258 lb) (12/06/19 1040)  SpO2: 93 % (12/08/19 0736)  Exam:   Physical Exam   Constitutional: He is oriented to person, place, and time  No distress  HENT:   Head: Normocephalic and atraumatic  Eyes: Pupils are equal, round, and reactive to light  EOM are normal    Neck: No JVD present  Cardiovascular: Normal rate and regular rhythm  Exam reveals no gallop and no friction rub  Murmur heard  Pulmonary/Chest: Effort normal and breath sounds normal  No stridor  No respiratory distress  He has no wheezes  Abdominal: Soft  Bowel sounds are normal  He exhibits distension  He exhibits no mass  There is no tenderness  There is no guarding  Musculoskeletal: Normal range of motion  He exhibits no edema  Neurological: He is alert and oriented to person, place, and time  He displays normal reflexes  No cranial nerve deficit   Coordination normal    Skin: Skin is warm and dry  He is not diaphoretic  Psychiatric: He has a normal mood and affect  His behavior is normal      Discussion with Family: patient only; will update wife at bedside when she comes to the hospital      Discharge instructions/Information to patient and family:   See after visit summary for information provided to patient and family  Provisions for Follow-Up Care:  See after visit summary for information related to follow-up care and any pertinent home health orders  Disposition:     Home    For Discharges to [facility]:   · Not Applicable to this Patient - Not Applicable to this Patient    Planned Readmission: none     Discharge Statement:  I spent 45 minutes discharging the patient  This time was spent on the day of discharge  I had direct contact with the patient on the day of discharge  Greater than 50% of the total time was spent examining patient, answering all patient questions, arranging and discussing plan of care with patient as well as directly providing post-discharge instructions  Additional time then spent on discharge activities  Discharge Medications:  See after visit summary for reconciled discharge medications provided to patient and family        ** Please Note: This note has been constructed using a voice recognition system **

## 2019-12-08 NOTE — PLAN OF CARE
Problem: Potential for Falls  Goal: Patient will remain free of falls  Description  INTERVENTIONS:  - Assess patient frequently for physical needs  -  Identify cognitive and physical deficits and behaviors that affect risk of falls    -  [place] fall precautions as indicated by assessment   - Educate patient/family on patient safety including physical limitations  - Instruct patient to call for assistance with activity based on assessment  - Modify environment to reduce risk of injury  - Consider OT/PT consult to assist with strengthening/mobility  Outcome: Completed     Problem: PAIN - ADULT  Goal: Verbalizes/displays adequate comfort level or baseline comfort level  Description  Interventions:  - Encourage patient to monitor pain and request assistance  - Assess pain using appropriate pain scale  - Administer analgesics based on type and severity of pain and evaluate response  - Implement non-pharmacological measures as appropriate and evaluate response  - Consider cultural and social influences on pain and pain management  - Notify physician/advanced practitioner if interventions unsuccessful or patient reports new pain  Outcome: Completed     Problem: INFECTION - ADULT  Goal: Absence or prevention of progression during hospitalization  Description  INTERVENTIONS:  - Assess and monitor for signs and symptoms of infection  - Monitor lab/diagnostic results  - Monitor all insertion sites, i e  indwelling lines, tubes, and drains  - Monitor endotracheal if appropriate and nasal secretions for changes in amount and color  - [place] appropriate cooling/warming therapies per order  - Administer medications as ordered  - Instruct and encourage patient and family to use good hand hygiene technique  - Identify and instruct in appropriate isolation precautions for identified infection/condition  Outcome: Completed

## 2019-12-16 NOTE — TELEPHONE ENCOUNTER
ERCP scheduled with Dr Tran at [facility] on 1/13/20  I gave patient verbal instructions/mailed   Medication clearance faxed to [facility]

## 2019-12-16 NOTE — TELEPHONE ENCOUNTER
Pt to have ERCP on 1/13/20  [facility] gastro would like him to hold Plavix  For how long?       Please advise

## 2019-12-18 PROBLEM — I48.91 ATRIAL FIBRILLATION, NEW ONSET (HCC): Status: ACTIVE | Noted: 2019-01-01

## 2019-12-18 NOTE — PROGRESS NOTES
Hospital Follow Up   Office Visit Note  Julio Guthrie   78 y o    male   MRN: [id number]  [address]  [address]  [place] [address] [id number] [phone]    PCP: Schuyler Costa MD  Cardiologist: Dr Sarkar Nip      ERCP 1/13/20  Physician: Dr Edu Benitez    Physician Dr Jacob Martins- anticipated cholecystectomy      Assessment/plan  Preoperative cardiac examination  --EKG:  AV dual paced rhythm, 73 beats per minute  --Would continue aspirin 81/d  --Given AFib on his recent pacemaker interrogation, Start Eliquis 5 b i d  He can stop it 48 hours prior to any surgery and resume it when surgically safe to do so afterwards  --According to the notes that I am reviewing, he was to stop Plavix 6/2/16 per his vascular surgeon  He is not on it from a cardiac standpoint  Will stop it  --Recommend cardiac anesthesia for gallbladder procedures  He is moderately high CV risk given his severe AS and pulmonary hypertension  -- No further testing however is indicated  --He may benefit from Cardiac Anesthesia for his procedures, given moderate to severe AS and pulmonary hypertension  Acute cholecystitis/ Choledocholithiasis -ERCP scheduled for 1/13/20, followed by a cholecystectomy shortly thereafter  Aortic stenosis, moderate to severe  Pulm HTN, mod-severe  In part, Group 2, due to left sided heart disease  Intermittent heart block and syncope ;status post Randi MRI compatible dual chamber permanent pacemaker July 2019  Is pacer dependent  --interrogation 11/5/19:  Daric transmission alert for new onset AFib  Battery voltage adequate 11 9 years  AP 74%,  greater than 99%  All lead parameters within normal limits  Dr Romayne Alias ( [facility])  recommended starting anticoagulation due to high chads 2 Vasc score of 6 ( htn, TIA, PVD, age)  Atrial fibrillation- start Eliquis 5 b i d  Hold 48 hours prior to his surgery and resume when surgically safe to do so afterwards  Hypertension  BP  138/62 by me large cuff  Hyperlipidemia,Lipid profile 6/26/19:  d ; OFF statin due to transaminitis, from the hospital  Hx left MCA CVA 2016, suspected to be due to atheroembolic phenomenon from severe left internal carotid artery stenosis  On aspirin /statin  Carotid stenosis,  status post left endarterectomy in 2016  On ASA/statin  Cardiac testing  --TTE 12/6/19  EF 55   No RWMA  Moderate concentric hypertrophy  Moderate to severe AS  Mild AI  Pulmonary artery systolic pressure was moderately to markedly increased  The findings suggest moderate to severe pulmonary hypertension  Peak PASP 60 mm HG    RV normal size and function  A small pericardial effusion was identified posterior to the heart  There was no evidence of hemodynamic compromise  Mild MR No interval change from July 2019  --TTE July 2019 EF 56%  No RWMA  Mod concentric LVH  Grade 2 diastolic dysfunction  Mod LAE  Mod AS  VTI was 1 32  Mean gradient 23 mm HG; peak 39 mm HG   Although measurements indicated moderate degree of stenosis, the valve appeared more severely stenotic on direct visualization, Clinical correlation is advised  a small, loculated pericardial effusion was identified posterior to the heart  The fluid had no internal echoes  There was no evidence of hemodynamic compromise  Summary of recommendations  --Would continue aspirin 81/d  --Given AFib on his recent pacemaker interrogation, Start Eliquis 5 b i d  He can stop it 48 hours prior to any surgery and resume it when surgically safe to do so afterwards  --According to the notes that I am reviewing, he was to stop Plavix 6/2/16 per his vascular surgeon  He is not on it from a cardiac standpoint  Will stop it  --Recommend cardiac anesthesia for gallbladder procedures  He is moderately high CV risk given his severe AS and pulmonary hypertension     -- No further testing however is indicated  --He may benefit from Cardiac Anesthesia for his procedures  --Follow up will be scheduled with Dr Fidencio Hernandez in 1 month               HPI  Silvestre Daniels is an 78year old gentleman with history of moderate aortic stenosis, hypertension and  hyperlipidemia  In the past he was found have intermittent high-degree block with syncope  He underwent implantation of an MRI compatible pacemaker, in July 2019  He was last seen by his cardiologist in August and felt to be stable  He had an ischemic stroke in 2016 and underwent a left carotid endarterectomy  He was placed on Plavix at that time  Interval history   Upon routine interrogation of his pacemaker in November, he was found to have new onset atrial fibrillation  Anticoagulation was recommended  At this point he is on aspirin and Plavix      He recently presented to the hospital December 5th with abdominal pain and jaundice  He is found to have transaminitis secondary to biliary obstruction of the common bile duct and cystic duct and diagnosed and treated medically for acute cholecystitis  General surgery recommended an ERCP by GI prior to proceeding with cholecystectomy  An ERCP was performed however a sphincterotomy was unable to be performed as the patient was on antiplatelet therapy with Plavix given history of CVA due to left carotid stenosis  A stent was placed; the stone was not extracted  The patient was discharged home to complete antibiotics  An outpatient ERCP is recommended followed by cholecystectomy  He was found to have a pericardial effusion on CT scan of the abdomen pelvis  This was noted on a prior echocardiogram from July 2019  A repeat echocardiogram was performed which again showed a small pericardial effusion identified posterior to the heart  In addition, moderate to severe aortic stenosis was noted as well as moderate to markedly increased pulmonary artery systolic pressure suggesting moderate to severe pulmonary hypertension    He has been referred to Cardiology to consider when Plavix can be held and for preoperative evaluation prior to the above-mentioned procedures  He is not on Plavix for cardiac reason  He was  reportedly placed on Plavix in 2016 by his neurologist        12/19/19  He presents for follow-up and cardiac evaluation prior to ERCP and cholecystectomy  He was found to have incidental pulmonary hypertension on his echo and moderate- severe aortic stenosis  There was also a small pericardial effusion, posterior to the heart, present on an echo July 2019  This may be chronic  He is completely asymptomatic  He is active  He denies chest pain, shortness of breath or syncope  He denies palpitations or lower extremity edema  He reports his abdominal pain has improved as well  He has not need any pain medication  On a pacemaker alert November 5th, atrial fibrillation was discovered  His EKG today shows AV paced rhythm  He is agreeable to starting anticoagulation  It is noted that he is on aspirin and Plavix  The patient does recall having been told by Dr Harvey Escudero a few years ago that he can stop the Plavix  It is not clear why he is still on it  He is not on at from a cardiac standpoint  Given that we will start Eliquis will stop it  He is agreeable to coming back after his gallbladder surgery, to further discuss his valve disease and pulmonary hypertension with his cardiologist Dr Mimi Jaime  Diagnoses and all orders for this visit:    Preoperative cardiovascular examination  -     POCT ECG    Mixed hyperlipidemia  -     POCT ECG    Essential hypertension  -     POCT ECG    TIA involving carotid artery  -     POCT ECG    History of CVA (cerebrovascular accident)  -     POCT ECG    Acute cholecystitis    PAF (paroxysmal atrial fibrillation) (HCC)  -     apixaban (ELIQUIS) 5 mg;  Take 1 tablet (5 mg total) by mouth 2 (two) times a day          Past Medical History:   Diagnosis Date    Acute ischemic left MCA stroke ([facility] [address] ) 2/4/2016    Aortic stenosis     Gastric ulcer 2017    Hyperlipidemia     Hypertension     TIA (transient ischemic attack)        Review of Systems   Constitution: Negative for chills  Cardiovascular: Negative for chest pain, claudication, cyanosis, dyspnea on exertion, irregular heartbeat, leg swelling, near-syncope, orthopnea, palpitations, paroxysmal nocturnal dyspnea and syncope  Respiratory: Negative for cough and shortness of breath  Gastrointestinal: Negative for heartburn and nausea  Neurological: Negative for dizziness, focal weakness, headaches, light-headedness and weakness  All other systems reviewed and are negative  No Known Allergies    Current Outpatient Medications:     Ascorbic Acid (VITAMIN C) 100 MG tablet, Take 100 mg by mouth daily, Disp: , Rfl:     aspirin 81 MG tablet, Take 81 mg by mouth daily  , Disp: , Rfl:     atorvastatin (LIPITOR) 40 mg tablet, Take 1 tablet (40 mg total) by mouth daily, Disp: 30 tablet, Rfl: 0    CO-ENZYME Q-10 PO, Take 1 tablet by mouth daily  , Disp: , Rfl:     cyanocobalamin (VITAMIN B-12) 100 mcg tablet, Take 50 mcg by mouth daily, Disp: , Rfl:     ferrous sulfate 325 (65 Fe) mg tablet, Take 325 mg by mouth daily with breakfast , Disp: , Rfl:     folic acid (FOLVITE) 519 MCG tablet, Take 400 mcg by mouth daily  , Disp: , Rfl:     Garlic 10 MG CAPS, Take 10 mg by mouth, Disp: , Rfl:     Grape Seed 100 MG CAPS, Take 100 mg by mouth, Disp: , Rfl:     levOCARNitine (L-CARNITINE) 250 MG CAPS, Take 250 mg by mouth, Disp: , Rfl:     lisinopril (ZESTRIL) 5 mg tablet, Take 1 tablet (5 mg total) by mouth daily, Disp: 30 tablet, Rfl: 0    Lutein 10 MG TABS, Take 10 mg by mouth, Disp: , Rfl:     magnesium 30 MG tablet, Take 30 mg by mouth 2 (two) times a day, Disp: , Rfl:     milk thistle 175 MG tablet, Take 175 mg by mouth daily, Disp: , Rfl:     Multiple Vitamins-Minerals (ONE-A-DAY MENS 50+ ADVANTAGE PO), Take by mouth, Disp: , Rfl:     Omega-3 Fatty Acids (FISH OIL) 1,000 mg, Take 1,000 mg by mouth daily  , Disp: , Rfl:     apixaban (ELIQUIS) 5 mg, Take 1 tablet (5 mg total) by mouth 2 (two) times a day, Disp: 60 tablet, Rfl: 1    Biotin 1 MG CAPS, Take 1 mg by mouth, Disp: , Rfl:     thiamine 100 MG tablet, Take 100 mg by mouth 2 (two) times a day , Disp: , Rfl:         Social History     Socioeconomic History    Marital status: /Civil Union     Spouse name: Not on file    Number of children: Not on file    Years of education: Not on file    Highest education level: Not on file   Occupational History    Not on file   Social Needs    Financial resource strain: Not on file    Food insecurity:     Worry: Not on file     Inability: Not on file    Transportation needs:     Medical: Not on file     Non-medical: Not on file   Tobacco Use    Smoking status: Former Smoker     Packs/day:      Years: 15      Pack years: 15 00     Types: Cigarettes     Start date:      Last attempt to quit:      Years since quittin 9    Smokeless tobacco: Never Used   Substance and Sexual Activity    Alcohol use: Not Currently     Alcohol/week: 6 0 standard drinks     Types: 6 Cans of beer per week     Frequency: 2-3 times a week     Drinks per session: 1 or 2     Binge frequency: Never     Comment: reduced ~1 week ago from 6-10/day (42+ /wk)    Drug use: No    Sexual activity: Not on file   Lifestyle    Physical activity:     Days per week: Not on file     Minutes per session: Not on file    Stress: Not on file   Relationships    Social connections:     Talks on phone: Not on file     Gets together: Not on file     Attends Zoroastrian service: Not on file     Active member of club or organization: Not on file     Attends meetings of clubs or organizations: Not on file     Relationship status: Not on file    Intimate partner violence:     Fear of current or ex partner: Not on file     Emotionally abused: Not on file     Physically abused: Not on file     Forced sexual activity: Not on file Other Topics Concern    Not on file   Social History Narrative    Ret   Lives in [address]  High school education  Wife manages finances  Family History   Problem Relation Age of Onset    Leukemia Brother     Leukemia Mother        Physical Exam   Constitutional: He is oriented to person, place, and time  No distress  HENT:   Head: Normocephalic and atraumatic  Eyes: Conjunctivae and EOM are normal    Neck: Normal range of motion  Neck supple  Cardiovascular: Normal rate, regular rhythm and intact distal pulses  Murmur heard  Harsh midsystolic murmur is present with a grade of 3/6 at the upper right sternal border radiating to the neck  Pulmonary/Chest: Effort normal and breath sounds normal    Abdominal: Soft  Bowel sounds are normal    Musculoskeletal: Normal range of motion  Neurological: He is alert and oriented to person, place, and time  + fine tremors UE   Skin: Skin is warm and dry  Psychiatric: He has a normal mood and affect  Nursing note and vitals reviewed  Vitals: Blood pressure 138/62, pulse 72, height 5' 11" (1 803 m), weight 111 kg (244 lb 14 4 oz), SpO2 97 %  Wt Readings from Last 3 Encounters:   12/18/19 111 kg (244 lb 14 4 oz)   12/06/19 117 kg (258 lb)   08/20/19 114 kg (251 lb)         Labs & Results:  Lab Results   Component Value Date    WBC 8 87 12/07/2019    HGB 12 5 12/07/2019    HCT 37 9 12/07/2019    MCV 93 12/07/2019    PLT 95 (L) 12/07/2019     No results found for: BNP  No components found for: CHEM  Troponin I   Date Value Ref Range Status   12/05/2019 0 05 ng/mL Final     Comment:       Siemens Chemistry analyzer 99% cutoff is > 0 04 ng/mL in network labs     o cTnI 99% cutoff is useful only when applied to patients in the clinical setting of myocardial ischemia   o cTnI 99% cutoff should be interpreted in the context of clinical history, ECG findings and possibly cardiac imaging to establish correct diagnosis     o cTnI 99% cutoff may be suggestive but clearly not indicative of a coronary event without the clinical setting of myocardial ischemia      2019 <0 02 <=0 04 ng/mL Final     Comment:       Siemens Chemistry analyzer 99% cutoff is > 0 04 ng/mL in network labs     o cTnI 99% cutoff is useful only when applied to patients in the clinical setting of myocardial ischemia   o cTnI 99% cutoff should be interpreted in the context of clinical history, ECG findings and possibly cardiac imaging to establish correct diagnosis  o cTnI 99% cutoff may be suggestive but clearly not indicative of a coronary event without the clinical setting of myocardial ischemia  Results for orders placed during the hospital encounter of 16   Echo complete with contrast if indicated    Narrative [facility]  [place], [address]  [phone]    Transthoracic Echocardiogram  2D, M-mode, Doppler, and Color Doppler    Study date:  2016    Patient: Conrado Abdul  MR number: [id number]  Account number: [de-identified]  : 1940  Age: 66 years  Gender: Male  Status: Inpatient  Location: Bedside  Height: 70 in  Weight: 218 5 lb  BP: 122/ 59 mmHg    Indications: Evaluate for suspected cardiac source of embolism  Diagnoses: G45 9 - Transient cerebral ischemic attack, unspecified    Sonographer:  TAL Guzmán  Primary Physician:  Duane Thrasher MD  Referring Physician:  Theo Rosenbaum MD  Group:  [id number]  Interpreting Physician:  Radene Severance, MD    IMPRESSIONS:  There was no diagnostic echocardiographic evidence for a cardiac source of  embolism  SUMMARY    PROCEDURE INFORMATION:  Echocardiographic views were limited due to restricted patient mobility and  poor patient compliance  LEFT VENTRICLE:  Systolic function was normal by visual assessment  Ejection fraction was  estimated to be 60 %    There were no regional wall motion abnormalities  Wall thickness was moderately to markedly increased  There was moderate concentric hypertrophy  Doppler parameters were consistent with abnormal left ventricular relaxation  (grade 1 diastolic dysfunction)  MITRAL VALVE:  There was moderate annular calcification  There was mild regurgitation  AORTIC VALVE:  Transaortic velocity was increased due to valvular stenosis  There was mild stenosis  There was mild to moderate regurgitation  Valve mean gradient was 18 95 mmHg  TRICUSPID VALVE:  There was mild to moderate regurgitation  Pulmonary artery systolic pressure was within the normal range  Estimated peak PA pressure was 35 mmHg  PULMONIC VALVE:  Not well visualized  AORTA:  The root exhibited mild dilatation  HISTORY: Consistent with transient ischemic attack or stroke  PRIOR HISTORY:  Risk factors: alcohol abuse  PROCEDURE: The procedure was performed at the bedside  This was a routine  study  The transthoracic approach was used  The study included complete 2D  imaging, M-mode, complete spectral Doppler, and color Doppler  Echocardiographic views were limited due to restricted patient mobility and  poor patient compliance  This was a technically difficult study  LEFT VENTRICLE: Size was normal  Systolic function was normal by visual  assessment  Ejection fraction was estimated to be 60 %  There were no regional  wall motion abnormalities  Wall thickness was moderately to markedly increased  There was moderate concentric hypertrophy  No evidence of apical thrombus  DOPPLER: Doppler parameters were consistent with abnormal left ventricular  relaxation (grade 1 diastolic dysfunction)  RIGHT VENTRICLE: The size was normal  Systolic function was normal  Wall  thickness was normal     LEFT ATRIUM: Size was normal     RIGHT ATRIUM: Size was normal     MITRAL VALVE: There was moderate annular calcification   Valve structure was  normal  There was normal leaflet separation  DOPPLER: The transmitral velocity  was within the normal range  There was no evidence for stenosis  There was mild  regurgitation  AORTIC VALVE: DOPPLER: Transaortic velocity was increased due to valvular  stenosis  There was mild stenosis  There was mild to moderate regurgitation  TRICUSPID VALVE: The valve structure was normal  There was normal leaflet  separation  DOPPLER: The transtricuspid velocity was within the normal range  There was no evidence for stenosis  There was mild to moderate regurgitation  Pulmonary artery systolic pressure was within the normal range  Estimated peak  PA pressure was 35 mmHg  PULMONIC VALVE: Not well visualized  DOPPLER: The transpulmonic velocity was  within the normal range  There was no significant regurgitation  PERICARDIUM: There was no pericardial effusion  The pericardium was normal in  appearance  AORTA: The root exhibited mild dilatation  SYSTEMIC VEINS: IVC: The inferior vena cava was normal in size  MEASUREMENT TABLES    DOPPLER MEASUREMENTS  Aortic valve   (Reference normals)  Peak itzel   2 93 cm/s   (--)  Mean itzel   2 05 cm/s   (--)  Peak gradient   34 27 mmHg   (--)  Mean gradient   18 95 mmHg   (--)  Valve area   1 5 cm squared   (--)  Valve area, cont   1 5 cm squared   (--)    SYSTEM MEASUREMENT TABLES    2D  %FS: 32 1 %  AV Diam: 3 9 cm  EDV(Teich): 84 9 ml  EF(Teich): 60 6 %  ESV(Teich): 33 5 ml  IVSd: 1 8 cm  LA Area: 22 4 cm2  LA Diam: 3 6 cm  LVEDV MOD A4C: 79 2 ml  LVEF MOD A4C: 66 8 %  LVESV MOD A4C: 26 3 ml  LVIDd: 4 3 cm  LVIDs: 2 9 cm  LVLd A4C: 7 2 cm  LVLs A4C: 6 2 cm  LVOT Diam: 2 2 cm  LVPWd: 1 6 cm  RA Area: 23 7 cm2  RV Diam : 2 9 cm  SI(Teich): 23 7 ml/m2  SV MOD A4C: 52 9 ml  SV(Cube): 56 2 ml  SV(Teich): 51 4 ml    CW  AV Env  Ti: 310 5 ms  AV VTI: 63 6 cm  AV Vmax: 2 9 m/s  AV Vmean: 2 m/s  AV maxP 3 mmHg  AV meanP mmHg  TR Vmax: 2 5 m/s  TR maxP 1 mmHg    PW  HELEN (VTI): 1 7 cm2  HELEN Vmax: 1 7 cm2  E': 0 1 m/s  E/E': 16 4  LVOT Env  Ti: 354 9 ms  LVOT VTI: 27 9 cm  LVOT Vmax: 1 2 m/s  LVOT Vmean: 0 8 m/s  LVOT maxP 2 mmHg  LVOT meanPG: 3 mmHg  MV A Jules: 1 1 m/s  MV [place]: 3 8 m/s2  MV DecT: 259 1 ms  MV E Jules: 1 m/s  MV E/A Ratio: 0 9    [employer] Accredited Echocardiography Laboratory    Prepared and electronically signed by    Aurea Carmen MD  Signed [id number] 15:58:12       No results found for this or any previous visit  This note was completed in part utilizing m-[employer] fluency direct voice recognition software  Grammatical errors, random word insertion, spelling mistakes, and incomplete sentences may be an occasional consequence of the system secondary to software limitations, ambient noise and hardware issues  At the time of dictation, efforts were made to edit, clarify and /or correct errors  Please read the chart carefully and recognize, using context, where substitutions have occurred    If you have any questions or concerns about the context, text or information contained within the body of this dictation, please contact myself, the provider, for further clarification

## 2019-12-18 NOTE — LETTER
December 18, 2019     Quinn Coppola MD  General acute hospital  [address]    Patient: Dilshad Castillo   YOB: 1940   Date of Visit: 12/18/2019     Dear Dr Teddy Barakat      Thank you for referring Dilshad Castillo to me for evaluation  Below are the relevant portions of my assessment and plan of care  If you have questions, please do not hesitate to call me  I look forward to following David Will along with you           Sincerely,        GERALDINE Cross        CC: MD Amy Barton MD Winifred Brodie, DO    Progress Notes:

## 2020-01-01 ENCOUNTER — HOSPITAL ENCOUNTER (OUTPATIENT)
Dept: INFUSION CENTER | Facility: CLINIC | Age: 80
Discharge: HOME | End: 2020-10-27
Payer: COMMERCIAL

## 2020-01-01 ENCOUNTER — OFFICE VISIT (OUTPATIENT)
Dept: CARDIOLOGY CLINIC | Facility: CLINIC | Age: 80
End: 2020-01-01
Payer: COMMERCIAL

## 2020-01-01 ENCOUNTER — HOSPITAL ENCOUNTER (OUTPATIENT)
Dept: INFUSION CENTER | Facility: CLINIC | Age: 80
Discharge: HOME | End: 2020-10-17
Payer: COMMERCIAL

## 2020-01-01 ENCOUNTER — TELEPHONE (OUTPATIENT)
Dept: CARDIOLOGY CLINIC | Facility: CLINIC | Age: 80
End: 2020-01-01

## 2020-01-01 ENCOUNTER — APPOINTMENT (OUTPATIENT)
Dept: NON INVASIVE DIAGNOSTICS | Facility: HOSPITAL | Age: 80
DRG: 242 | End: 2020-01-01
Payer: COMMERCIAL

## 2020-01-01 ENCOUNTER — HOSPITAL ENCOUNTER (OUTPATIENT)
Dept: RADIOLOGY | Facility: HOSPITAL | Age: 80
Discharge: HOME | End: 2020-01-13
Attending: INTERNAL MEDICINE
Payer: COMMERCIAL

## 2020-01-01 ENCOUNTER — HOSPITAL ENCOUNTER (OUTPATIENT)
Dept: INFUSION CENTER | Facility: CLINIC | Age: 80
Discharge: HOME | End: 2020-10-16
Payer: COMMERCIAL

## 2020-01-01 ENCOUNTER — APPOINTMENT (INPATIENT)
Dept: VASCULAR ULTRASOUND | Facility: HOSPITAL | Age: 80
DRG: 515 | End: 2020-01-01
Payer: COMMERCIAL

## 2020-01-01 ENCOUNTER — HOSPITAL ENCOUNTER (OUTPATIENT)
Dept: INFUSION CENTER | Facility: CLINIC | Age: 80
Discharge: HOME | End: 2020-10-20
Payer: COMMERCIAL

## 2020-01-01 ENCOUNTER — HOSPITAL ENCOUNTER (OUTPATIENT)
Dept: INFUSION CENTER | Facility: CLINIC | Age: 80
Discharge: HOME | End: 2020-10-26
Payer: COMMERCIAL

## 2020-01-01 ENCOUNTER — APPOINTMENT (INPATIENT)
Dept: CT IMAGING | Facility: HOSPITAL | Age: 80
DRG: 515 | End: 2020-01-01
Payer: COMMERCIAL

## 2020-01-01 ENCOUNTER — HOSPITAL ENCOUNTER (EMERGENCY)
Facility: HOSPITAL | Age: 80
End: 2020-11-24
Attending: EMERGENCY MEDICINE | Admitting: EMERGENCY MEDICINE
Payer: COMMERCIAL

## 2020-01-01 ENCOUNTER — ANESTHESIA (OUTPATIENT)
Dept: PERIOP | Facility: HOSPITAL | Age: 80
End: 2020-01-01
Payer: COMMERCIAL

## 2020-01-01 ENCOUNTER — APPOINTMENT (INPATIENT)
Dept: RADIOLOGY | Facility: HOSPITAL | Age: 80
DRG: 242 | End: 2020-01-01
Payer: COMMERCIAL

## 2020-01-01 ENCOUNTER — APPOINTMENT (INPATIENT)
Dept: RADIOLOGY | Facility: HOSPITAL | Age: 80
DRG: 515 | End: 2020-01-01
Payer: COMMERCIAL

## 2020-01-01 ENCOUNTER — HOSPITAL ENCOUNTER (OUTPATIENT)
Dept: INFUSION CENTER | Facility: CLINIC | Age: 80
Discharge: HOME | End: 2020-10-28
Payer: COMMERCIAL

## 2020-01-01 ENCOUNTER — LAB (OUTPATIENT)
Dept: LAB | Facility: CLINIC | Age: 80
End: 2020-01-01
Payer: COMMERCIAL

## 2020-01-01 ENCOUNTER — HOSPITAL ENCOUNTER (OUTPATIENT)
Dept: INFUSION CENTER | Facility: CLINIC | Age: 80
Discharge: HOME | End: 2020-10-15
Payer: COMMERCIAL

## 2020-01-01 ENCOUNTER — ANESTHESIA (INPATIENT)
Dept: NON INVASIVE DIAGNOSTICS | Facility: HOSPITAL | Age: 80
DRG: 515 | End: 2020-01-01
Payer: COMMERCIAL

## 2020-01-01 ENCOUNTER — ANESTHESIA EVENT (OUTPATIENT)
Dept: PERIOP | Facility: HOSPITAL | Age: 80
End: 2020-01-01
Payer: COMMERCIAL

## 2020-01-01 ENCOUNTER — HOSPITAL ENCOUNTER (OUTPATIENT)
Dept: INFUSION CENTER | Facility: CLINIC | Age: 80
Discharge: HOME | End: 2020-10-24
Payer: COMMERCIAL

## 2020-01-01 ENCOUNTER — HOSPITAL ENCOUNTER (OUTPATIENT)
Dept: GASTROENTEROLOGY | Facility: HOSPITAL | Age: 80
Setting detail: OUTPATIENT SURGERY
Discharge: HOME | End: 2020-01-13
Attending: INTERNAL MEDICINE | Admitting: INTERNAL MEDICINE
Payer: COMMERCIAL

## 2020-01-01 ENCOUNTER — TELEPHONE (OUTPATIENT)
Dept: HEMATOLOGY ONCOLOGY | Facility: CLINIC | Age: 80
End: 2020-01-01

## 2020-01-01 ENCOUNTER — TELEPHONE (OUTPATIENT)
Dept: GASTROENTEROLOGY | Facility: AMBULARY SURGERY CENTER | Age: 80
End: 2020-01-01

## 2020-01-01 ENCOUNTER — HOSPITAL ENCOUNTER (OUTPATIENT)
Facility: HOSPITAL | Age: 80
Setting detail: OUTPATIENT SURGERY
Discharge: HOME | End: 2020-01-15
Attending: SURGERY | Admitting: SURGERY
Payer: COMMERCIAL

## 2020-01-01 ENCOUNTER — HOSPITAL ENCOUNTER (OUTPATIENT)
Dept: INFUSION CENTER | Facility: CLINIC | Age: 80
Discharge: HOME | End: 2020-10-21
Payer: COMMERCIAL

## 2020-01-01 ENCOUNTER — HOSPITAL ENCOUNTER (INPATIENT)
Facility: HOSPITAL | Age: 80
LOS: 8 days | DRG: 515 | End: 2020-10-06
Attending: EMERGENCY MEDICINE | Admitting: INTERNAL MEDICINE
Payer: COMMERCIAL

## 2020-01-01 ENCOUNTER — TELEPHONE (OUTPATIENT)
Dept: OTHER | Facility: OTHER | Age: 80
End: 2020-01-01

## 2020-01-01 ENCOUNTER — HOSPITAL ENCOUNTER (OUTPATIENT)
Dept: INFUSION CENTER | Facility: CLINIC | Age: 80
Discharge: HOME | End: 2020-10-19
Payer: COMMERCIAL

## 2020-01-01 ENCOUNTER — APPOINTMENT (INPATIENT)
Dept: ULTRASOUND IMAGING | Facility: HOSPITAL | Age: 80
DRG: 515 | End: 2020-01-01
Payer: COMMERCIAL

## 2020-01-01 ENCOUNTER — HOSPITAL ENCOUNTER (OUTPATIENT)
Dept: INFUSION CENTER | Facility: CLINIC | Age: 80
Discharge: HOME | End: 2020-10-18
Payer: COMMERCIAL

## 2020-01-01 ENCOUNTER — HOSPITAL ENCOUNTER (OUTPATIENT)
Dept: INFUSION CENTER | Facility: CLINIC | Age: 80
Discharge: HOME | End: 2020-10-25
Payer: COMMERCIAL

## 2020-01-01 ENCOUNTER — ANESTHESIA (OUTPATIENT)
Dept: GASTROENTEROLOGY | Facility: HOSPITAL | Age: 80
End: 2020-01-01
Payer: COMMERCIAL

## 2020-01-01 ENCOUNTER — OFFICE VISIT (OUTPATIENT)
Dept: NEPHROLOGY | Facility: CLINIC | Age: 80
End: 2020-01-01
Payer: COMMERCIAL

## 2020-01-01 ENCOUNTER — NURSE TRIAGE (OUTPATIENT)
Dept: OTHER | Facility: OTHER | Age: 80
End: 2020-01-01

## 2020-01-01 ENCOUNTER — TRANSCRIBE ORDERS (OUTPATIENT)
Dept: ADMINISTRATIVE | Facility: HOSPITAL | Age: 80
End: 2020-01-01

## 2020-01-01 ENCOUNTER — ANESTHESIA EVENT (OUTPATIENT)
Dept: GASTROENTEROLOGY | Facility: HOSPITAL | Age: 80
End: 2020-01-01
Payer: COMMERCIAL

## 2020-01-01 ENCOUNTER — APPOINTMENT (INPATIENT)
Dept: NON INVASIVE DIAGNOSTICS | Facility: HOSPITAL | Age: 80
DRG: 515 | End: 2020-01-01
Attending: INTERNAL MEDICINE
Payer: COMMERCIAL

## 2020-01-01 ENCOUNTER — HOSPITAL ENCOUNTER (OUTPATIENT)
Dept: INFUSION CENTER | Facility: CLINIC | Age: 80
Discharge: HOME | End: 2020-10-23
Payer: COMMERCIAL

## 2020-01-01 ENCOUNTER — HOSPITAL ENCOUNTER (OUTPATIENT)
Dept: INFUSION CENTER | Facility: CLINIC | Age: 80
Discharge: HOME | End: 2020-10-14
Payer: COMMERCIAL

## 2020-01-01 ENCOUNTER — HOSPITAL ENCOUNTER (OUTPATIENT)
Dept: VASCULAR ULTRASOUND | Facility: HOSPITAL | Age: 80
Discharge: HOME | End: 2020-10-22
Payer: COMMERCIAL

## 2020-01-01 ENCOUNTER — REMOTE DEVICE CLINIC VISIT (OUTPATIENT)
Dept: CARDIOLOGY CLINIC | Facility: CLINIC | Age: 80
End: 2020-01-01
Payer: COMMERCIAL

## 2020-01-01 ENCOUNTER — TELEPHONE (OUTPATIENT)
Dept: NEUROLOGY | Facility: CLINIC | Age: 80
End: 2020-01-01

## 2020-01-01 ENCOUNTER — APPOINTMENT (EMERGENCY)
Dept: CT IMAGING | Facility: HOSPITAL | Age: 80
DRG: 515 | End: 2020-01-01
Payer: COMMERCIAL

## 2020-01-01 ENCOUNTER — HOSPITAL ENCOUNTER (INPATIENT)
Facility: HOSPITAL | Age: 80
LOS: 7 days | Discharge: HOME | DRG: 242 | End: 2020-10-13
Attending: INTERNAL MEDICINE | Admitting: INTERNAL MEDICINE
Payer: COMMERCIAL

## 2020-01-01 ENCOUNTER — IN-CLINIC DEVICE VISIT (OUTPATIENT)
Dept: CARDIOLOGY CLINIC | Facility: CLINIC | Age: 80
End: 2020-01-01

## 2020-01-01 ENCOUNTER — HOSPITAL ENCOUNTER (OUTPATIENT)
Dept: INFUSION CENTER | Facility: CLINIC | Age: 80
Discharge: HOME | End: 2020-10-22
Payer: COMMERCIAL

## 2020-01-01 ENCOUNTER — TRANSCRIBE ORDERS (OUTPATIENT)
Dept: LAB | Facility: CLINIC | Age: 80
End: 2020-01-01

## 2020-01-01 VITALS
OXYGEN SATURATION: 95 % | HEART RATE: 83 BPM | DIASTOLIC BLOOD PRESSURE: 77 MMHG | BODY MASS INDEX: 34.93 KG/M2 | RESPIRATION RATE: 18 BRPM | HEIGHT: 70 IN | SYSTOLIC BLOOD PRESSURE: 162 MMHG | WEIGHT: 244 LBS | TEMPERATURE: 97.3 F

## 2020-01-01 VITALS
TEMPERATURE: 97.7 F | HEIGHT: 70 IN | HEART RATE: 73 BPM | SYSTOLIC BLOOD PRESSURE: 131 MMHG | BODY MASS INDEX: 34.93 KG/M2 | RESPIRATION RATE: 18 BRPM | DIASTOLIC BLOOD PRESSURE: 59 MMHG | WEIGHT: 244 LBS | OXYGEN SATURATION: 95 %

## 2020-01-01 VITALS
SYSTOLIC BLOOD PRESSURE: 138 MMHG | OXYGEN SATURATION: 96 % | RESPIRATION RATE: 18 BRPM | TEMPERATURE: 97.3 F | HEART RATE: 62 BPM | DIASTOLIC BLOOD PRESSURE: 65 MMHG

## 2020-01-01 VITALS
DIASTOLIC BLOOD PRESSURE: 60 MMHG | HEART RATE: 93 BPM | TEMPERATURE: 97 F | SYSTOLIC BLOOD PRESSURE: 138 MMHG | RESPIRATION RATE: 18 BRPM | OXYGEN SATURATION: 100 %

## 2020-01-01 VITALS
DIASTOLIC BLOOD PRESSURE: 78 MMHG | SYSTOLIC BLOOD PRESSURE: 149 MMHG | RESPIRATION RATE: 18 BRPM | OXYGEN SATURATION: 97 % | HEART RATE: 90 BPM | TEMPERATURE: 98 F

## 2020-01-01 VITALS
HEART RATE: 86 BPM | DIASTOLIC BLOOD PRESSURE: 60 MMHG | OXYGEN SATURATION: 93 % | WEIGHT: 261.1 LBS | HEIGHT: 61 IN | SYSTOLIC BLOOD PRESSURE: 140 MMHG | BODY MASS INDEX: 49.29 KG/M2

## 2020-01-01 VITALS
RESPIRATION RATE: 18 BRPM | HEART RATE: 88 BPM | BODY MASS INDEX: 35.05 KG/M2 | OXYGEN SATURATION: 96 % | WEIGHT: 244.8 LBS | DIASTOLIC BLOOD PRESSURE: 80 MMHG | DIASTOLIC BLOOD PRESSURE: 70 MMHG | SYSTOLIC BLOOD PRESSURE: 148 MMHG | HEIGHT: 70 IN | TEMPERATURE: 98.2 F | HEART RATE: 73 BPM | SYSTOLIC BLOOD PRESSURE: 142 MMHG

## 2020-01-01 VITALS
SYSTOLIC BLOOD PRESSURE: 140 MMHG | TEMPERATURE: 97.3 F | HEART RATE: 77 BPM | DIASTOLIC BLOOD PRESSURE: 68 MMHG | RESPIRATION RATE: 16 BRPM | OXYGEN SATURATION: 97 %

## 2020-01-01 VITALS
RESPIRATION RATE: 18 BRPM | DIASTOLIC BLOOD PRESSURE: 52 MMHG | SYSTOLIC BLOOD PRESSURE: 130 MMHG | TEMPERATURE: 97.1 F | OXYGEN SATURATION: 93 % | HEART RATE: 64 BPM

## 2020-01-01 VITALS
BODY MASS INDEX: 35.12 KG/M2 | DIASTOLIC BLOOD PRESSURE: 74 MMHG | SYSTOLIC BLOOD PRESSURE: 159 MMHG | WEIGHT: 251.8 LBS | TEMPERATURE: 97.5 F | RESPIRATION RATE: 18 BRPM | HEART RATE: 67 BPM | OXYGEN SATURATION: 91 %

## 2020-01-01 VITALS
DIASTOLIC BLOOD PRESSURE: 72 MMHG | RESPIRATION RATE: 18 BRPM | TEMPERATURE: 96.6 F | HEART RATE: 66 BPM | SYSTOLIC BLOOD PRESSURE: 122 MMHG

## 2020-01-01 VITALS
RESPIRATION RATE: 18 BRPM | SYSTOLIC BLOOD PRESSURE: 140 MMHG | OXYGEN SATURATION: 95 % | HEART RATE: 63 BPM | DIASTOLIC BLOOD PRESSURE: 62 MMHG | TEMPERATURE: 97.4 F

## 2020-01-01 VITALS
SYSTOLIC BLOOD PRESSURE: 146 MMHG | RESPIRATION RATE: 18 BRPM | TEMPERATURE: 97.2 F | DIASTOLIC BLOOD PRESSURE: 68 MMHG | HEART RATE: 84 BPM

## 2020-01-01 VITALS
WEIGHT: 257.4 LBS | HEART RATE: 80 BPM | DIASTOLIC BLOOD PRESSURE: 66 MMHG | HEIGHT: 71 IN | BODY MASS INDEX: 36.03 KG/M2 | SYSTOLIC BLOOD PRESSURE: 140 MMHG | TEMPERATURE: 97.7 F

## 2020-01-01 VITALS
DIASTOLIC BLOOD PRESSURE: 70 MMHG | OXYGEN SATURATION: 94 % | SYSTOLIC BLOOD PRESSURE: 158 MMHG | HEART RATE: 69 BPM | RESPIRATION RATE: 20 BRPM | TEMPERATURE: 97.1 F

## 2020-01-01 VITALS
DIASTOLIC BLOOD PRESSURE: 50 MMHG | SYSTOLIC BLOOD PRESSURE: 130 MMHG | HEART RATE: 78 BPM | TEMPERATURE: 97.8 F | HEIGHT: 71 IN | WEIGHT: 258.9 LBS | BODY MASS INDEX: 36.24 KG/M2

## 2020-01-01 VITALS
TEMPERATURE: 97.5 F | SYSTOLIC BLOOD PRESSURE: 146 MMHG | RESPIRATION RATE: 16 BRPM | DIASTOLIC BLOOD PRESSURE: 75 MMHG | OXYGEN SATURATION: 97 % | HEART RATE: 89 BPM

## 2020-01-01 VITALS — HEART RATE: 57 BPM

## 2020-01-01 VITALS
OXYGEN SATURATION: 93 % | DIASTOLIC BLOOD PRESSURE: 70 MMHG | RESPIRATION RATE: 18 BRPM | TEMPERATURE: 96.3 F | SYSTOLIC BLOOD PRESSURE: 130 MMHG | HEART RATE: 76 BPM

## 2020-01-01 VITALS
SYSTOLIC BLOOD PRESSURE: 140 MMHG | RESPIRATION RATE: 18 BRPM | HEART RATE: 65 BPM | TEMPERATURE: 96.8 F | DIASTOLIC BLOOD PRESSURE: 65 MMHG

## 2020-01-01 VITALS
TEMPERATURE: 97.7 F | WEIGHT: 261.69 LBS | DIASTOLIC BLOOD PRESSURE: 69 MMHG | RESPIRATION RATE: 18 BRPM | SYSTOLIC BLOOD PRESSURE: 163 MMHG | BODY MASS INDEX: 36.64 KG/M2 | HEART RATE: 61 BPM | HEIGHT: 71 IN | OXYGEN SATURATION: 95 %

## 2020-01-01 VITALS
RESPIRATION RATE: 18 BRPM | DIASTOLIC BLOOD PRESSURE: 70 MMHG | OXYGEN SATURATION: 96 % | SYSTOLIC BLOOD PRESSURE: 153 MMHG | HEART RATE: 68 BPM | TEMPERATURE: 97 F

## 2020-01-01 VITALS — RESPIRATION RATE: 20 BRPM | HEART RATE: 61 BPM | SYSTOLIC BLOOD PRESSURE: 154 MMHG | DIASTOLIC BLOOD PRESSURE: 69 MMHG

## 2020-01-01 VITALS — HEART RATE: 63 BPM

## 2020-01-01 VITALS — HEART RATE: 60 BPM

## 2020-01-01 DIAGNOSIS — I07.9 ENDOCARDITIS OF TRICUSPID VALVE: ICD-10-CM

## 2020-01-01 DIAGNOSIS — Z86.73 HISTORY OF CVA (CEREBROVASCULAR ACCIDENT): ICD-10-CM

## 2020-01-01 DIAGNOSIS — K80.50 CHOLEDOCHOLITHIASIS: ICD-10-CM

## 2020-01-01 DIAGNOSIS — B95.5 BACTEREMIA DUE TO STREPTOCOCCUS: Primary | ICD-10-CM

## 2020-01-01 DIAGNOSIS — Z95.0 PRESENCE OF CARDIAC PACEMAKER: Primary | ICD-10-CM

## 2020-01-01 DIAGNOSIS — I07.9 ENDOCARDITIS OF TRICUSPID VALVE: Primary | ICD-10-CM

## 2020-01-01 DIAGNOSIS — I48.0 PAROXYSMAL ATRIAL FIBRILLATION (HCC): ICD-10-CM

## 2020-01-01 DIAGNOSIS — I45.9 HEART BLOCK: ICD-10-CM

## 2020-01-01 DIAGNOSIS — I63.239 CAROTID ARTERY STENOSIS WITH CEREBRAL INFARCTION (HCC): Chronic | ICD-10-CM

## 2020-01-01 DIAGNOSIS — R60.9 EDEMA, UNSPECIFIED: ICD-10-CM

## 2020-01-01 DIAGNOSIS — G44.59 OTHER COMPLICATED HEADACHE SYNDROME: ICD-10-CM

## 2020-01-01 DIAGNOSIS — N17.9 ACUTE KIDNEY INJURY (HCC): Primary | ICD-10-CM

## 2020-01-01 DIAGNOSIS — I63.239 CAROTID ARTERY STENOSIS WITH CEREBRAL INFARCTION (HCC): ICD-10-CM

## 2020-01-01 DIAGNOSIS — R78.81 BACTEREMIA DUE TO STREPTOCOCCUS: Primary | ICD-10-CM

## 2020-01-01 DIAGNOSIS — M79.89 LEG SWELLING: ICD-10-CM

## 2020-01-01 DIAGNOSIS — I10 ESSENTIAL HYPERTENSION: Chronic | ICD-10-CM

## 2020-01-01 DIAGNOSIS — I35.0 NONRHEUMATIC AORTIC VALVE STENOSIS: ICD-10-CM

## 2020-01-01 DIAGNOSIS — I63.9 CEREBROVASCULAR ACCIDENT (CVA), UNSPECIFIED MECHANISM (HCC): Chronic | ICD-10-CM

## 2020-01-01 DIAGNOSIS — I27.20 PULMONARY HTN (HCC): ICD-10-CM

## 2020-01-01 DIAGNOSIS — Z95.0 PACEMAKER: Primary | ICD-10-CM

## 2020-01-01 DIAGNOSIS — I35.0 NONRHEUMATIC AORTIC VALVE STENOSIS: Primary | ICD-10-CM

## 2020-01-01 DIAGNOSIS — Z95.0 PACEMAKER: ICD-10-CM

## 2020-01-01 DIAGNOSIS — R51.9 ACUTE INTRACTABLE HEADACHE, UNSPECIFIED HEADACHE TYPE: ICD-10-CM

## 2020-01-01 DIAGNOSIS — R51.9 INTRACTABLE HEADACHE, UNSPECIFIED CHRONICITY PATTERN, UNSPECIFIED HEADACHE TYPE: ICD-10-CM

## 2020-01-01 DIAGNOSIS — I87.2 VENOUS INSUFFICIENCY (CHRONIC) (PERIPHERAL): ICD-10-CM

## 2020-01-01 DIAGNOSIS — Z09 HOSPITAL DISCHARGE FOLLOW-UP: Primary | ICD-10-CM

## 2020-01-01 DIAGNOSIS — E78.2 MIXED HYPERLIPIDEMIA: Chronic | ICD-10-CM

## 2020-01-01 DIAGNOSIS — B95.5 BACTEREMIA DUE TO STREPTOCOCCUS: ICD-10-CM

## 2020-01-01 DIAGNOSIS — R78.81 BACTEREMIA DUE TO STREPTOCOCCUS: ICD-10-CM

## 2020-01-01 DIAGNOSIS — R65.10 SIRS (SYSTEMIC INFLAMMATORY RESPONSE SYNDROME) (HCC): ICD-10-CM

## 2020-01-01 DIAGNOSIS — R60.9 EDEMA, UNSPECIFIED: Primary | ICD-10-CM

## 2020-01-01 DIAGNOSIS — I48.0 PAROXYSMAL ATRIAL FIBRILLATION (HCC): Primary | ICD-10-CM

## 2020-01-01 DIAGNOSIS — I63.239 CAROTID ARTERY STENOSIS WITH CEREBRAL INFARCTION (HCC): Primary | Chronic | ICD-10-CM

## 2020-01-01 DIAGNOSIS — Z91.89 AT RISK FOR OBSTRUCTIVE SLEEP APNEA: Primary | ICD-10-CM

## 2020-01-01 DIAGNOSIS — E78.2 MIXED HYPERLIPIDEMIA: ICD-10-CM

## 2020-01-01 DIAGNOSIS — I46.9 CARDIAC ARREST (HCC): Primary | ICD-10-CM

## 2020-01-01 DIAGNOSIS — I50.32 CHRONIC DIASTOLIC HF (HEART FAILURE) (HCC): ICD-10-CM

## 2020-01-01 DIAGNOSIS — I31.3 PERICARDIAL EFFUSION: ICD-10-CM

## 2020-01-01 DIAGNOSIS — I10 ESSENTIAL HYPERTENSION: Primary | Chronic | ICD-10-CM

## 2020-01-01 DIAGNOSIS — N17.9 AKI (ACUTE KIDNEY INJURY) (HCC): Primary | ICD-10-CM

## 2020-01-01 DIAGNOSIS — N18.9 CKD (CHRONIC KIDNEY DISEASE): ICD-10-CM

## 2020-01-01 DIAGNOSIS — K81.0 ACUTE CHOLECYSTITIS: ICD-10-CM

## 2020-01-01 LAB
ABO GROUP BLD BPU: NORMAL
ABO GROUP BLD: NORMAL
ALBUMIN SERPL BCP-MCNC: 2.2 G/DL (ref 3.5–5)
ALBUMIN SERPL BCP-MCNC: 2.7 G/DL (ref 3.5–5)
ALBUMIN SERPL BCP-MCNC: 2.7 G/DL (ref 3.5–5)
ALBUMIN SERPL BCP-MCNC: 2.8 G/DL (ref 3.5–5)
ALBUMIN SERPL BCP-MCNC: 2.8 G/DL (ref 3.5–5)
ALBUMIN SERPL BCP-MCNC: 2.9 G/DL (ref 3.5–5)
ALBUMIN SERPL BCP-MCNC: 3 G/DL (ref 3.5–5)
ALBUMIN SERPL BCP-MCNC: 3 G/DL (ref 3.5–5)
ALBUMIN SERPL BCP-MCNC: 3.1 G/DL (ref 3.5–5)
ALBUMIN SERPL ELPH-MCNC: 3.07 G/DL (ref 3.5–5)
ALBUMIN SERPL ELPH-MCNC: 51.2 % (ref 52–65)
ALBUMIN UR ELPH-MCNC: 25.8 %
ALP SERPL-CCNC: 101 U/L (ref 46–116)
ALP SERPL-CCNC: 103 U/L (ref 46–116)
ALP SERPL-CCNC: 105 U/L (ref 46–116)
ALP SERPL-CCNC: 108 U/L (ref 46–116)
ALP SERPL-CCNC: 117 U/L (ref 46–116)
ALP SERPL-CCNC: 123 U/L (ref 46–116)
ALP SERPL-CCNC: 123 U/L (ref 46–116)
ALP SERPL-CCNC: 133 U/L (ref 46–116)
ALP SERPL-CCNC: 162 U/L (ref 46–116)
ALPHA1 GLOB MFR UR ELPH: 28.9 %
ALPHA1 GLOB SERPL ELPH-MCNC: 0.49 G/DL (ref 0.1–0.4)
ALPHA1 GLOB SERPL ELPH-MCNC: 8.1 % (ref 2.5–5)
ALPHA2 GLOB MFR UR ELPH: 9.4 %
ALPHA2 GLOB SERPL ELPH-MCNC: 0.85 G/DL (ref 0.4–1.2)
ALPHA2 GLOB SERPL ELPH-MCNC: 14.2 % (ref 7–13)
ALT SERPL W P-5'-P-CCNC: 22 U/L (ref 12–78)
ALT SERPL W P-5'-P-CCNC: 29 U/L (ref 12–78)
ALT SERPL W P-5'-P-CCNC: 29 U/L (ref 12–78)
ALT SERPL W P-5'-P-CCNC: 30 U/L (ref 12–78)
ALT SERPL W P-5'-P-CCNC: 43 U/L (ref 12–78)
ALT SERPL W P-5'-P-CCNC: 44 U/L (ref 12–78)
ALT SERPL W P-5'-P-CCNC: 48 U/L (ref 12–78)
ALT SERPL W P-5'-P-CCNC: 49 U/L (ref 12–78)
ALT SERPL W P-5'-P-CCNC: 91 U/L (ref 12–78)
ANION GAP SERPL CALCULATED.3IONS-SCNC: 10 MMOL/L (ref 4–13)
ANION GAP SERPL CALCULATED.3IONS-SCNC: 11 MMOL/L (ref 4–13)
ANION GAP SERPL CALCULATED.3IONS-SCNC: 11 MMOL/L (ref 4–13)
ANION GAP SERPL CALCULATED.3IONS-SCNC: 13 MMOL/L (ref 4–13)
ANION GAP SERPL CALCULATED.3IONS-SCNC: 4 MMOL/L (ref 4–13)
ANION GAP SERPL CALCULATED.3IONS-SCNC: 6 MMOL/L (ref 4–13)
ANION GAP SERPL CALCULATED.3IONS-SCNC: 7 MMOL/L (ref 4–13)
ANION GAP SERPL CALCULATED.3IONS-SCNC: 8 MMOL/L (ref 4–13)
AST SERPL W P-5'-P-CCNC: 19 U/L (ref 5–45)
AST SERPL W P-5'-P-CCNC: 26 U/L (ref 5–45)
AST SERPL W P-5'-P-CCNC: 27 U/L (ref 5–45)
AST SERPL W P-5'-P-CCNC: 27 U/L (ref 5–45)
AST SERPL W P-5'-P-CCNC: 29 U/L (ref 5–45)
AST SERPL W P-5'-P-CCNC: 30 U/L (ref 5–45)
AST SERPL W P-5'-P-CCNC: 31 U/L (ref 5–45)
AST SERPL W P-5'-P-CCNC: 41 U/L (ref 5–45)
AST SERPL W P-5'-P-CCNC: 78 U/L (ref 5–45)
ATRIAL RATE: 119 BPM
ATRIAL RATE: 70 BPM
B-GLOBULIN MFR UR ELPH: 10.4 %
BACTERIA BLD CULT: ABNORMAL
BACTERIA BLD CULT: ABNORMAL
BACTERIA BLD CULT: NORMAL
BACTERIA BLD CULT: NORMAL
BACTERIA UR QL AUTO: ABNORMAL /HPF
BACTERIA UR QL AUTO: ABNORMAL /HPF
BASOPHILS # BLD AUTO: 0.02 THOUSANDS/ΜL (ref 0–0.1)
BASOPHILS # BLD AUTO: 0.02 THOUSANDS/ΜL (ref 0–0.1)
BASOPHILS # BLD AUTO: 0.03 THOUSANDS/ΜL (ref 0–0.1)
BASOPHILS # BLD AUTO: 0.04 THOUSANDS/ΜL (ref 0–0.1)
BASOPHILS # BLD AUTO: 0.06 THOUSANDS/ΜL (ref 0–0.1)
BASOPHILS # BLD MANUAL: 0 THOUSAND/UL (ref 0–0.1)
BASOPHILS NFR BLD AUTO: 0 % (ref 0–1)
BASOPHILS NFR BLD AUTO: 1 % (ref 0–1)
BASOPHILS NFR MAR MANUAL: 0 % (ref 0–1)
BETA GLOB ABNORMAL SERPL ELPH-MCNC: 0.31 G/DL (ref 0.4–0.8)
BETA1 GLOB SERPL ELPH-MCNC: 5.2 % (ref 5–13)
BETA2 GLOB SERPL ELPH-MCNC: 4.7 % (ref 2–8)
BETA2+GAMMA GLOB SERPL ELPH-MCNC: 0.28 G/DL (ref 0.2–0.5)
BILIRUB SERPL-MCNC: 0.58 MG/DL (ref 0.2–1)
BILIRUB SERPL-MCNC: 0.66 MG/DL (ref 0.2–1)
BILIRUB SERPL-MCNC: 0.67 MG/DL (ref 0.2–1)
BILIRUB SERPL-MCNC: 0.72 MG/DL (ref 0.2–1)
BILIRUB SERPL-MCNC: 0.75 MG/DL (ref 0.2–1)
BILIRUB SERPL-MCNC: 1.64 MG/DL (ref 0.2–1)
BILIRUB SERPL-MCNC: 1.66 MG/DL (ref 0.2–1)
BILIRUB SERPL-MCNC: 1.7 MG/DL (ref 0.2–1)
BILIRUB SERPL-MCNC: 2.31 MG/DL (ref 0.2–1)
BILIRUB UR QL STRIP: ABNORMAL
BILIRUB UR QL STRIP: NEGATIVE
BLD GP AB SCN SERPL QL: NEGATIVE
BLD SMEAR INTERP: NORMAL
BPU ID: NORMAL
BUN SERPL-MCNC: 22 MG/DL (ref 5–25)
BUN SERPL-MCNC: 23 MG/DL (ref 5–25)
BUN SERPL-MCNC: 24 MG/DL (ref 5–25)
BUN SERPL-MCNC: 25 MG/DL (ref 5–25)
BUN SERPL-MCNC: 27 MG/DL (ref 5–25)
BUN SERPL-MCNC: 29 MG/DL (ref 5–25)
BUN SERPL-MCNC: 31 MG/DL (ref 5–25)
BUN SERPL-MCNC: 31 MG/DL (ref 5–25)
BUN SERPL-MCNC: 32 MG/DL (ref 5–25)
BUN SERPL-MCNC: 35 MG/DL (ref 5–25)
BUN SERPL-MCNC: 41 MG/DL (ref 5–25)
BUN SERPL-MCNC: 43 MG/DL (ref 5–25)
BUN SERPL-MCNC: 45 MG/DL (ref 5–25)
CALCIUM ALBUM COR SERPL-MCNC: 10.1 MG/DL (ref 8.3–10.1)
CALCIUM ALBUM COR SERPL-MCNC: 8.8 MG/DL (ref 8.3–10.1)
CALCIUM ALBUM COR SERPL-MCNC: 9 MG/DL (ref 8.3–10.1)
CALCIUM ALBUM COR SERPL-MCNC: 9.1 MG/DL (ref 8.3–10.1)
CALCIUM ALBUM COR SERPL-MCNC: 9.2 MG/DL (ref 8.3–10.1)
CALCIUM ALBUM COR SERPL-MCNC: 9.2 MG/DL (ref 8.3–10.1)
CALCIUM ALBUM COR SERPL-MCNC: 9.3 MG/DL (ref 8.3–10.1)
CALCIUM SERPL-MCNC: 7.7 MG/DL (ref 8.3–10.1)
CALCIUM SERPL-MCNC: 7.8 MG/DL (ref 8.3–10.1)
CALCIUM SERPL-MCNC: 7.8 MG/DL (ref 8.3–10.1)
CALCIUM SERPL-MCNC: 8.1 MG/DL (ref 8.3–10.1)
CALCIUM SERPL-MCNC: 8.1 MG/DL (ref 8.3–10.1)
CALCIUM SERPL-MCNC: 8.2 MG/DL (ref 8.3–10.1)
CALCIUM SERPL-MCNC: 8.2 MG/DL (ref 8.3–10.1)
CALCIUM SERPL-MCNC: 8.3 MG/DL (ref 8.3–10.1)
CALCIUM SERPL-MCNC: 8.5 MG/DL (ref 8.3–10.1)
CALCIUM SERPL-MCNC: 8.5 MG/DL (ref 8.3–10.1)
CALCIUM SERPL-MCNC: 8.6 MG/DL (ref 8.3–10.1)
CALCIUM SERPL-MCNC: 8.7 MG/DL (ref 8.3–10.1)
CALCIUM SERPL-MCNC: 8.7 MG/DL (ref 8.3–10.1)
CALCIUM SERPL-MCNC: 8.8 MG/DL (ref 8.3–10.1)
CHLORIDE SERPL-SCNC: 100 MMOL/L (ref 100–108)
CHLORIDE SERPL-SCNC: 100 MMOL/L (ref 100–108)
CHLORIDE SERPL-SCNC: 101 MMOL/L (ref 100–108)
CHLORIDE SERPL-SCNC: 102 MMOL/L (ref 100–108)
CHLORIDE SERPL-SCNC: 103 MMOL/L (ref 100–108)
CHLORIDE SERPL-SCNC: 104 MMOL/L (ref 100–108)
CHLORIDE SERPL-SCNC: 105 MMOL/L (ref 100–108)
CHLORIDE SERPL-SCNC: 99 MMOL/L (ref 100–108)
CHLORIDE SERPL-SCNC: 99 MMOL/L (ref 100–108)
CLARITY UR: CLEAR
CLARITY UR: CLEAR
CO2 SERPL-SCNC: 22 MMOL/L (ref 21–32)
CO2 SERPL-SCNC: 24 MMOL/L (ref 21–32)
CO2 SERPL-SCNC: 25 MMOL/L (ref 21–32)
CO2 SERPL-SCNC: 27 MMOL/L (ref 21–32)
CO2 SERPL-SCNC: 29 MMOL/L (ref 21–32)
CO2 SERPL-SCNC: 32 MMOL/L (ref 21–32)
CO2 SERPL-SCNC: 33 MMOL/L (ref 21–32)
COLOR UR: ABNORMAL
COLOR UR: ABNORMAL
CREAT SERPL-MCNC: 1.31 MG/DL (ref 0.6–1.3)
CREAT SERPL-MCNC: 1.36 MG/DL (ref 0.6–1.3)
CREAT SERPL-MCNC: 1.45 MG/DL (ref 0.6–1.3)
CREAT SERPL-MCNC: 1.45 MG/DL (ref 0.6–1.3)
CREAT SERPL-MCNC: 1.46 MG/DL (ref 0.6–1.3)
CREAT SERPL-MCNC: 1.49 MG/DL (ref 0.6–1.3)
CREAT SERPL-MCNC: 1.51 MG/DL (ref 0.6–1.3)
CREAT SERPL-MCNC: 1.52 MG/DL (ref 0.6–1.3)
CREAT SERPL-MCNC: 1.53 MG/DL (ref 0.6–1.3)
CREAT SERPL-MCNC: 1.58 MG/DL (ref 0.6–1.3)
CREAT SERPL-MCNC: 1.61 MG/DL (ref 0.6–1.3)
CREAT SERPL-MCNC: 1.65 MG/DL (ref 0.6–1.3)
CREAT SERPL-MCNC: 1.7 MG/DL (ref 0.6–1.3)
CREAT SERPL-MCNC: 1.76 MG/DL (ref 0.6–1.3)
CREAT SERPL-MCNC: 1.79 MG/DL (ref 0.6–1.3)
CREAT SERPL-MCNC: 2.14 MG/DL (ref 0.6–1.3)
CREAT UR-MCNC: 220 MG/DL
CROSSMATCH: NORMAL
CRP SERPL QL: 213.9 MG/L
EOSINOPHIL # BLD AUTO: 0.01 THOUSAND/ΜL (ref 0–0.61)
EOSINOPHIL # BLD AUTO: 0.02 THOUSAND/ΜL (ref 0–0.61)
EOSINOPHIL # BLD AUTO: 0.06 THOUSAND/ΜL (ref 0–0.61)
EOSINOPHIL # BLD AUTO: 0.1 THOUSAND/ΜL (ref 0–0.61)
EOSINOPHIL # BLD AUTO: 0.1 THOUSAND/ΜL (ref 0–0.61)
EOSINOPHIL # BLD AUTO: 0.14 THOUSAND/ΜL (ref 0–0.61)
EOSINOPHIL # BLD AUTO: 0.15 THOUSAND/ΜL (ref 0–0.61)
EOSINOPHIL # BLD AUTO: 0.21 THOUSAND/ΜL (ref 0–0.61)
EOSINOPHIL # BLD MANUAL: 0 THOUSAND/UL (ref 0–0.4)
EOSINOPHIL NFR BLD AUTO: 0 % (ref 0–6)
EOSINOPHIL NFR BLD AUTO: 1 % (ref 0–6)
EOSINOPHIL NFR BLD AUTO: 1 % (ref 0–6)
EOSINOPHIL NFR BLD AUTO: 2 % (ref 0–6)
EOSINOPHIL NFR BLD AUTO: 5 % (ref 0–6)
EOSINOPHIL NFR BLD MANUAL: 0 % (ref 0–6)
ERYTHROCYTE [DISTWIDTH] IN BLOOD BY AUTOMATED COUNT: 14.8 % (ref 11.6–15.1)
ERYTHROCYTE [DISTWIDTH] IN BLOOD BY AUTOMATED COUNT: 14.8 % (ref 11.6–15.1)
ERYTHROCYTE [DISTWIDTH] IN BLOOD BY AUTOMATED COUNT: 14.9 % (ref 11.6–15.1)
ERYTHROCYTE [DISTWIDTH] IN BLOOD BY AUTOMATED COUNT: 15 % (ref 11.6–15.1)
ERYTHROCYTE [DISTWIDTH] IN BLOOD BY AUTOMATED COUNT: 15.1 % (ref 11.6–15.1)
ERYTHROCYTE [DISTWIDTH] IN BLOOD BY AUTOMATED COUNT: 15.2 % (ref 11.6–15.1)
ERYTHROCYTE [DISTWIDTH] IN BLOOD BY AUTOMATED COUNT: 15.2 % (ref 11.6–15.1)
ERYTHROCYTE [DISTWIDTH] IN BLOOD BY AUTOMATED COUNT: 15.3 % (ref 11.6–15.1)
ERYTHROCYTE [DISTWIDTH] IN BLOOD BY AUTOMATED COUNT: 15.6 % (ref 11.6–15.1)
ERYTHROCYTE [DISTWIDTH] IN BLOOD BY AUTOMATED COUNT: 15.8 % (ref 11.6–15.1)
ERYTHROCYTE [DISTWIDTH] IN BLOOD BY AUTOMATED COUNT: 16.3 % (ref 11.6–15.1)
ERYTHROCYTE [DISTWIDTH] IN BLOOD BY AUTOMATED COUNT: 16.4 % (ref 11.6–15.1)
ERYTHROCYTE [DISTWIDTH] IN BLOOD BY AUTOMATED COUNT: 16.5 % (ref 11.6–15.1)
ERYTHROCYTE [DISTWIDTH] IN BLOOD BY AUTOMATED COUNT: 16.7 % (ref 11.6–15.1)
ERYTHROCYTE [SEDIMENTATION RATE] IN BLOOD: 45 MM/HOUR (ref 0–19)
ERYTHROCYTE [SEDIMENTATION RATE] IN BLOOD: 48 MM/HOUR (ref 0–19)
FERRITIN SERPL-MCNC: 181 NG/ML (ref 8–388)
FOLATE SERPL-MCNC: >20 NG/ML (ref 3.1–17.5)
GAMMA GLOB ABNORMAL SERPL ELPH-MCNC: 1 G/DL (ref 0.5–1.6)
GAMMA GLOB MFR UR ELPH: 25.5 %
GAMMA GLOB SERPL ELPH-MCNC: 16.6 % (ref 12–22)
GBM AB SER IA-ACNC: 3 UNITS (ref 0–20)
GFR SERPL CREATININE-BSD FRML MDRD: 28 ML/MIN/1.73SQ M
GFR SERPL CREATININE-BSD FRML MDRD: 35 ML/MIN/1.73SQ M
GFR SERPL CREATININE-BSD FRML MDRD: 35 ML/MIN/1.73SQ M
GFR SERPL CREATININE-BSD FRML MDRD: 37 ML/MIN/1.73SQ M
GFR SERPL CREATININE-BSD FRML MDRD: 38 ML/MIN/1.73SQ M
GFR SERPL CREATININE-BSD FRML MDRD: 40 ML/MIN/1.73SQ M
GFR SERPL CREATININE-BSD FRML MDRD: 40 ML/MIN/1.73SQ M
GFR SERPL CREATININE-BSD FRML MDRD: 42 ML/MIN/1.73SQ M
GFR SERPL CREATININE-BSD FRML MDRD: 42 ML/MIN/1.73SQ M
GFR SERPL CREATININE-BSD FRML MDRD: 43 ML/MIN/1.73SQ M
GFR SERPL CREATININE-BSD FRML MDRD: 43 ML/MIN/1.73SQ M
GFR SERPL CREATININE-BSD FRML MDRD: 44 ML/MIN/1.73SQ M
GFR SERPL CREATININE-BSD FRML MDRD: 45 ML/MIN/1.73SQ M
GFR SERPL CREATININE-BSD FRML MDRD: 45 ML/MIN/1.73SQ M
GFR SERPL CREATININE-BSD FRML MDRD: 48 ML/MIN/1.73SQ M
GFR SERPL CREATININE-BSD FRML MDRD: 51 ML/MIN/1.73SQ M
GGT SERPL-CCNC: 102 U/L (ref 5–85)
GLUCOSE P FAST SERPL-MCNC: 108 MG/DL (ref 65–99)
GLUCOSE SERPL-MCNC: 100 MG/DL (ref 65–140)
GLUCOSE SERPL-MCNC: 102 MG/DL (ref 65–140)
GLUCOSE SERPL-MCNC: 102 MG/DL (ref 65–140)
GLUCOSE SERPL-MCNC: 103 MG/DL (ref 65–140)
GLUCOSE SERPL-MCNC: 104 MG/DL (ref 65–140)
GLUCOSE SERPL-MCNC: 109 MG/DL (ref 65–140)
GLUCOSE SERPL-MCNC: 110 MG/DL (ref 65–140)
GLUCOSE SERPL-MCNC: 117 MG/DL (ref 65–140)
GLUCOSE SERPL-MCNC: 118 MG/DL (ref 65–140)
GLUCOSE SERPL-MCNC: 121 MG/DL (ref 65–140)
GLUCOSE SERPL-MCNC: 129 MG/DL (ref 65–140)
GLUCOSE SERPL-MCNC: 131 MG/DL (ref 65–140)
GLUCOSE SERPL-MCNC: 133 MG/DL (ref 65–140)
GLUCOSE SERPL-MCNC: 141 MG/DL (ref 65–140)
GLUCOSE SERPL-MCNC: 142 MG/DL (ref 65–140)
GLUCOSE SERPL-MCNC: 158 MG/DL (ref 65–140)
GLUCOSE SERPL-MCNC: 90 MG/DL (ref 65–140)
GLUCOSE SERPL-MCNC: 92 MG/DL (ref 65–140)
GLUCOSE SERPL-MCNC: 93 MG/DL (ref 65–140)
GLUCOSE SERPL-MCNC: 94 MG/DL (ref 65–140)
GLUCOSE SERPL-MCNC: 95 MG/DL (ref 65–140)
GLUCOSE SERPL-MCNC: 97 MG/DL (ref 65–140)
GLUCOSE SERPL-MCNC: 97 MG/DL (ref 65–140)
GLUCOSE UR STRIP-MCNC: NEGATIVE MG/DL
GLUCOSE UR STRIP-MCNC: NEGATIVE MG/DL
GRAM STN SPEC: ABNORMAL
GRAM STN SPEC: ABNORMAL
HAV IGM SER QL: NORMAL
HBV CORE IGM SER QL: NORMAL
HBV SURFACE AG SER QL: NORMAL
HCT VFR BLD AUTO: 29.9 % (ref 36.5–49.3)
HCT VFR BLD AUTO: 30.8 % (ref 36.5–49.3)
HCT VFR BLD AUTO: 31 % (ref 36.5–49.3)
HCT VFR BLD AUTO: 31 % (ref 36.5–49.3)
HCT VFR BLD AUTO: 31.3 % (ref 36.5–49.3)
HCT VFR BLD AUTO: 31.3 % (ref 36.5–49.3)
HCT VFR BLD AUTO: 32.4 % (ref 36.5–49.3)
HCT VFR BLD AUTO: 32.5 % (ref 36.5–49.3)
HCT VFR BLD AUTO: 32.6 % (ref 36.5–49.3)
HCT VFR BLD AUTO: 33 % (ref 36.5–49.3)
HCT VFR BLD AUTO: 33.2 % (ref 36.5–49.3)
HCT VFR BLD AUTO: 33.2 % (ref 36.5–49.3)
HCT VFR BLD AUTO: 34.4 % (ref 36.5–49.3)
HCT VFR BLD AUTO: 35.9 % (ref 36.5–49.3)
HCV AB SER QL: NORMAL
HGB BLD-MCNC: 10.3 G/DL (ref 12–17)
HGB BLD-MCNC: 10.3 G/DL (ref 12–17)
HGB BLD-MCNC: 10.4 G/DL (ref 12–17)
HGB BLD-MCNC: 10.5 G/DL (ref 12–17)
HGB BLD-MCNC: 11 G/DL (ref 12–17)
HGB BLD-MCNC: 11.3 G/DL (ref 12–17)
HGB BLD-MCNC: 9.2 G/DL (ref 12–17)
HGB BLD-MCNC: 9.5 G/DL (ref 12–17)
HGB BLD-MCNC: 9.6 G/DL (ref 12–17)
HGB BLD-MCNC: 9.6 G/DL (ref 12–17)
HGB BLD-MCNC: 9.7 G/DL (ref 12–17)
HGB BLD-MCNC: 9.8 G/DL (ref 12–17)
HGB BLD-MCNC: 9.9 G/DL (ref 12–17)
HGB BLD-MCNC: 9.9 G/DL (ref 12–17)
HGB UR QL STRIP.AUTO: ABNORMAL
HGB UR QL STRIP.AUTO: ABNORMAL
IGG/ALB SER: 1.05 {RATIO} (ref 1.1–1.8)
IMM GRANULOCYTES # BLD AUTO: 0.01 THOUSAND/UL (ref 0–0.2)
IMM GRANULOCYTES # BLD AUTO: 0.02 THOUSAND/UL (ref 0–0.2)
IMM GRANULOCYTES # BLD AUTO: 0.03 THOUSAND/UL (ref 0–0.2)
IMM GRANULOCYTES # BLD AUTO: 0.05 THOUSAND/UL (ref 0–0.2)
IMM GRANULOCYTES # BLD AUTO: 0.06 THOUSAND/UL (ref 0–0.2)
IMM GRANULOCYTES # BLD AUTO: 0.09 THOUSAND/UL (ref 0–0.2)
IMM GRANULOCYTES # BLD AUTO: 0.11 THOUSAND/UL (ref 0–0.2)
IMM GRANULOCYTES # BLD AUTO: 0.12 THOUSAND/UL (ref 0–0.2)
IMM GRANULOCYTES # BLD AUTO: 0.13 THOUSAND/UL (ref 0–0.2)
IMM GRANULOCYTES # BLD AUTO: 0.16 THOUSAND/UL (ref 0–0.2)
IMM GRANULOCYTES NFR BLD AUTO: 0 % (ref 0–2)
IMM GRANULOCYTES NFR BLD AUTO: 1 % (ref 0–2)
IMM GRANULOCYTES NFR BLD AUTO: 2 % (ref 0–2)
IMM GRANULOCYTES NFR BLD AUTO: 2 % (ref 0–2)
INR PPP: 1.32 (ref 0.84–1.19)
IRON SATN MFR SERPL: 10 %
IRON SERPL-MCNC: 26 UG/DL (ref 65–175)
KAPPA LC FREE SER-MCNC: 41 MG/L (ref 3.3–19.4)
KAPPA LC FREE/LAMBDA FREE SER: 0.97 {RATIO} (ref 0.26–1.65)
KETONES UR STRIP-MCNC: NEGATIVE MG/DL
KETONES UR STRIP-MCNC: NEGATIVE MG/DL
LACTATE SERPL-SCNC: 1.5 MMOL/L (ref 0.5–2)
LAMBDA LC FREE SERPL-MCNC: 42.4 MG/L (ref 5.7–26.3)
LEUKOCYTE ESTERASE UR QL STRIP: NEGATIVE
LEUKOCYTE ESTERASE UR QL STRIP: NEGATIVE
LYMPHOCYTES # BLD AUTO: 0.49 THOUSAND/UL (ref 0.6–4.47)
LYMPHOCYTES # BLD AUTO: 0.63 THOUSANDS/ΜL (ref 0.6–4.47)
LYMPHOCYTES # BLD AUTO: 0.73 THOUSANDS/ΜL (ref 0.6–4.47)
LYMPHOCYTES # BLD AUTO: 0.76 THOUSANDS/ΜL (ref 0.6–4.47)
LYMPHOCYTES # BLD AUTO: 0.82 THOUSANDS/ΜL (ref 0.6–4.47)
LYMPHOCYTES # BLD AUTO: 0.89 THOUSANDS/ΜL (ref 0.6–4.47)
LYMPHOCYTES # BLD AUTO: 0.99 THOUSANDS/ΜL (ref 0.6–4.47)
LYMPHOCYTES # BLD AUTO: 1.21 THOUSANDS/ΜL (ref 0.6–4.47)
LYMPHOCYTES # BLD AUTO: 1.22 THOUSANDS/ΜL (ref 0.6–4.47)
LYMPHOCYTES # BLD AUTO: 1.32 THOUSANDS/ΜL (ref 0.6–4.47)
LYMPHOCYTES # BLD AUTO: 2.19 THOUSANDS/ΜL (ref 0.6–4.47)
LYMPHOCYTES # BLD AUTO: 4 % (ref 14–44)
LYMPHOCYTES NFR BLD AUTO: 11 % (ref 14–44)
LYMPHOCYTES NFR BLD AUTO: 14 % (ref 14–44)
LYMPHOCYTES NFR BLD AUTO: 15 % (ref 14–44)
LYMPHOCYTES NFR BLD AUTO: 17 % (ref 14–44)
LYMPHOCYTES NFR BLD AUTO: 19 % (ref 14–44)
LYMPHOCYTES NFR BLD AUTO: 23 % (ref 14–44)
LYMPHOCYTES NFR BLD AUTO: 7 % (ref 14–44)
LYMPHOCYTES NFR BLD AUTO: 7 % (ref 14–44)
LYMPHOCYTES NFR BLD AUTO: 8 % (ref 14–44)
LYMPHOCYTES NFR BLD AUTO: 8 % (ref 14–44)
MAGNESIUM SERPL-MCNC: 2.4 MG/DL (ref 1.6–2.6)
MAGNESIUM SERPL-MCNC: 2.4 MG/DL (ref 1.6–2.6)
MAGNESIUM SERPL-MCNC: 2.5 MG/DL (ref 1.6–2.6)
MAGNESIUM SERPL-MCNC: 2.6 MG/DL (ref 1.6–2.6)
MAGNESIUM SERPL-MCNC: 2.7 MG/DL (ref 1.6–2.6)
MCH RBC QN AUTO: 27.4 PG (ref 26.8–34.3)
MCH RBC QN AUTO: 27.8 PG (ref 26.8–34.3)
MCH RBC QN AUTO: 27.9 PG (ref 26.8–34.3)
MCH RBC QN AUTO: 27.9 PG (ref 26.8–34.3)
MCH RBC QN AUTO: 28 PG (ref 26.8–34.3)
MCH RBC QN AUTO: 28.1 PG (ref 26.8–34.3)
MCH RBC QN AUTO: 28.2 PG (ref 26.8–34.3)
MCH RBC QN AUTO: 28.2 PG (ref 26.8–34.3)
MCH RBC QN AUTO: 28.4 PG (ref 26.8–34.3)
MCH RBC QN AUTO: 28.5 PG (ref 26.8–34.3)
MCHC RBC AUTO-ENTMCNC: 30.4 G/DL (ref 31.4–37.4)
MCHC RBC AUTO-ENTMCNC: 30.6 G/DL (ref 31.4–37.4)
MCHC RBC AUTO-ENTMCNC: 30.6 G/DL (ref 31.4–37.4)
MCHC RBC AUTO-ENTMCNC: 30.7 G/DL (ref 31.4–37.4)
MCHC RBC AUTO-ENTMCNC: 30.8 G/DL (ref 31.4–37.4)
MCHC RBC AUTO-ENTMCNC: 31 G/DL (ref 31.4–37.4)
MCHC RBC AUTO-ENTMCNC: 31 G/DL (ref 31.4–37.4)
MCHC RBC AUTO-ENTMCNC: 31.2 G/DL (ref 31.4–37.4)
MCHC RBC AUTO-ENTMCNC: 31.3 G/DL (ref 31.4–37.4)
MCHC RBC AUTO-ENTMCNC: 31.5 G/DL (ref 31.4–37.4)
MCHC RBC AUTO-ENTMCNC: 31.6 G/DL (ref 31.4–37.4)
MCHC RBC AUTO-ENTMCNC: 31.7 G/DL (ref 31.4–37.4)
MCHC RBC AUTO-ENTMCNC: 31.8 G/DL (ref 31.4–37.4)
MCHC RBC AUTO-ENTMCNC: 32 G/DL (ref 31.4–37.4)
MCV RBC AUTO: 88 FL (ref 82–98)
MCV RBC AUTO: 89 FL (ref 82–98)
MCV RBC AUTO: 90 FL (ref 82–98)
MCV RBC AUTO: 91 FL (ref 82–98)
MCV RBC AUTO: 92 FL (ref 82–98)
MCV RBC AUTO: 92 FL (ref 82–98)
MCV RBC AUTO: 93 FL (ref 82–98)
MONOCYTES # BLD AUTO: 0.37 THOUSAND/ΜL (ref 0.17–1.22)
MONOCYTES # BLD AUTO: 0.45 THOUSAND/ΜL (ref 0.17–1.22)
MONOCYTES # BLD AUTO: 0.55 THOUSAND/ΜL (ref 0.17–1.22)
MONOCYTES # BLD AUTO: 0.56 THOUSAND/ΜL (ref 0.17–1.22)
MONOCYTES # BLD AUTO: 0.61 THOUSAND/UL (ref 0–1.22)
MONOCYTES # BLD AUTO: 0.62 THOUSAND/ΜL (ref 0.17–1.22)
MONOCYTES # BLD AUTO: 0.71 THOUSAND/ΜL (ref 0.17–1.22)
MONOCYTES # BLD AUTO: 0.72 THOUSAND/ΜL (ref 0.17–1.22)
MONOCYTES # BLD AUTO: 0.82 THOUSAND/ΜL (ref 0.17–1.22)
MONOCYTES # BLD AUTO: 0.88 THOUSAND/ΜL (ref 0.17–1.22)
MONOCYTES # BLD AUTO: 1.04 THOUSAND/ΜL (ref 0.17–1.22)
MONOCYTES NFR BLD AUTO: 10 % (ref 4–12)
MONOCYTES NFR BLD AUTO: 6 % (ref 4–12)
MONOCYTES NFR BLD AUTO: 6 % (ref 4–12)
MONOCYTES NFR BLD AUTO: 7 % (ref 4–12)
MONOCYTES NFR BLD AUTO: 8 % (ref 4–12)
MONOCYTES NFR BLD AUTO: 9 % (ref 4–12)
MONOCYTES NFR BLD: 5 % (ref 4–12)
MYELOPEROXIDASE AB SER IA-ACNC: NORMAL U/ML
NEUTROPHILS # BLD AUTO: 10.7 THOUSANDS/ΜL (ref 1.85–7.62)
NEUTROPHILS # BLD AUTO: 10.9 THOUSANDS/ΜL (ref 1.85–7.62)
NEUTROPHILS # BLD AUTO: 2.87 THOUSANDS/ΜL (ref 1.85–7.62)
NEUTROPHILS # BLD AUTO: 3.63 THOUSANDS/ΜL (ref 1.85–7.62)
NEUTROPHILS # BLD AUTO: 5.27 THOUSANDS/ΜL (ref 1.85–7.62)
NEUTROPHILS # BLD AUTO: 6.32 THOUSANDS/ΜL (ref 1.85–7.62)
NEUTROPHILS # BLD AUTO: 6.47 THOUSANDS/ΜL (ref 1.85–7.62)
NEUTROPHILS # BLD AUTO: 6.78 THOUSANDS/ΜL (ref 1.85–7.62)
NEUTROPHILS # BLD AUTO: 8.32 THOUSANDS/ΜL (ref 1.85–7.62)
NEUTROPHILS # BLD AUTO: 9.55 THOUSANDS/ΜL (ref 1.85–7.62)
NEUTROPHILS # BLD MANUAL: 11.08 THOUSAND/UL (ref 1.85–7.62)
NEUTS BAND NFR BLD MANUAL: 6 % (ref 0–8)
NEUTS SEG NFR BLD AUTO: 65 % (ref 43–75)
NEUTS SEG NFR BLD AUTO: 68 % (ref 43–75)
NEUTS SEG NFR BLD AUTO: 71 % (ref 43–75)
NEUTS SEG NFR BLD AUTO: 73 % (ref 43–75)
NEUTS SEG NFR BLD AUTO: 74 % (ref 43–75)
NEUTS SEG NFR BLD AUTO: 79 % (ref 43–75)
NEUTS SEG NFR BLD AUTO: 84 % (ref 43–75)
NEUTS SEG NFR BLD AUTO: 85 % (ref 43–75)
NITRITE UR QL STRIP: NEGATIVE
NITRITE UR QL STRIP: NEGATIVE
NON-SQ EPI CELLS URNS QL MICRO: ABNORMAL /HPF
NON-SQ EPI CELLS URNS QL MICRO: ABNORMAL /HPF
NRBC BLD AUTO-RTO: 0 /100 WBCS
OSMOLALITY UR: 643 MMOL/KG
PH UR STRIP.AUTO: 6 [PH]
PH UR STRIP.AUTO: 6 [PH]
PHOSPHATE SERPL-MCNC: 3 MG/DL (ref 2.3–4.1)
PHOSPHATE SERPL-MCNC: 4.6 MG/DL (ref 2.3–4.1)
PLATELET # BLD AUTO: 108 THOUSANDS/UL (ref 149–390)
PLATELET # BLD AUTO: 114 THOUSANDS/UL (ref 149–390)
PLATELET # BLD AUTO: 121 THOUSANDS/UL (ref 149–390)
PLATELET # BLD AUTO: 123 THOUSANDS/UL (ref 149–390)
PLATELET # BLD AUTO: 128 THOUSANDS/UL (ref 149–390)
PLATELET # BLD AUTO: 134 THOUSANDS/UL (ref 149–390)
PLATELET # BLD AUTO: 138 THOUSANDS/UL (ref 149–390)
PLATELET # BLD AUTO: 155 THOUSANDS/UL (ref 149–390)
PLATELET # BLD AUTO: 170 THOUSANDS/UL (ref 149–390)
PLATELET # BLD AUTO: 178 THOUSANDS/UL (ref 149–390)
PLATELET # BLD AUTO: 182 THOUSANDS/UL (ref 149–390)
PLATELET # BLD AUTO: 189 THOUSANDS/UL (ref 149–390)
PLATELET # BLD AUTO: 84 THOUSANDS/UL (ref 149–390)
PLATELET # BLD AUTO: 89 THOUSANDS/UL (ref 149–390)
PLATELET BLD QL SMEAR: ADEQUATE
PMV BLD AUTO: 10 FL (ref 8.9–12.7)
PMV BLD AUTO: 10.1 FL (ref 8.9–12.7)
PMV BLD AUTO: 10.1 FL (ref 8.9–12.7)
PMV BLD AUTO: 10.2 FL (ref 8.9–12.7)
PMV BLD AUTO: 10.3 FL (ref 8.9–12.7)
PMV BLD AUTO: 10.4 FL (ref 8.9–12.7)
PMV BLD AUTO: 10.5 FL (ref 8.9–12.7)
PMV BLD AUTO: 9.4 FL (ref 8.9–12.7)
PMV BLD AUTO: 9.7 FL (ref 8.9–12.7)
PMV BLD AUTO: 9.7 FL (ref 8.9–12.7)
PMV BLD AUTO: 9.8 FL (ref 8.9–12.7)
PMV BLD AUTO: 9.8 FL (ref 8.9–12.7)
POTASSIUM SERPL-SCNC: 3.4 MMOL/L (ref 3.5–5.3)
POTASSIUM SERPL-SCNC: 3.5 MMOL/L (ref 3.5–5.3)
POTASSIUM SERPL-SCNC: 3.6 MMOL/L (ref 3.5–5.3)
POTASSIUM SERPL-SCNC: 3.7 MMOL/L (ref 3.5–5.3)
POTASSIUM SERPL-SCNC: 3.8 MMOL/L (ref 3.5–5.3)
POTASSIUM SERPL-SCNC: 3.9 MMOL/L (ref 3.5–5.3)
POTASSIUM SERPL-SCNC: 4 MMOL/L (ref 3.5–5.3)
POTASSIUM SERPL-SCNC: 4.2 MMOL/L (ref 3.5–5.3)
POTASSIUM SERPL-SCNC: 4.2 MMOL/L (ref 3.5–5.3)
POTASSIUM SERPL-SCNC: 4.3 MMOL/L (ref 3.5–5.3)
POTASSIUM SERPL-SCNC: 4.4 MMOL/L (ref 3.5–5.3)
PROCALCITONIN SERPL-MCNC: 0.39 NG/ML
PROCALCITONIN SERPL-MCNC: 0.49 NG/ML
PROCALCITONIN SERPL-MCNC: 0.58 NG/ML
PROT PATTERN SERPL ELPH-IMP: ABNORMAL
PROT PATTERN UR ELPH-IMP: ABNORMAL
PROT SERPL-MCNC: 6 G/DL (ref 6.4–8.2)
PROT SERPL-MCNC: 6.3 G/DL (ref 6.4–8.2)
PROT SERPL-MCNC: 6.3 G/DL (ref 6.4–8.2)
PROT SERPL-MCNC: 6.5 G/DL (ref 6.4–8.2)
PROT SERPL-MCNC: 6.5 G/DL (ref 6.4–8.2)
PROT SERPL-MCNC: 6.7 G/DL (ref 6.4–8.2)
PROT SERPL-MCNC: 7.1 G/DL (ref 6.4–8.2)
PROT SERPL-MCNC: 7.4 G/DL (ref 6.4–8.2)
PROT UR STRIP-MCNC: ABNORMAL MG/DL
PROT UR STRIP-MCNC: ABNORMAL MG/DL
PROT UR-MCNC: 93 MG/DL
PROTHROMBIN TIME: 16.4 SECONDS (ref 11.6–14.5)
QRS AXIS: 34 DEGREES
QRS AXIS: 58 DEGREES
QRSD INTERVAL: 114 MS
QRSD INTERVAL: 156 MS
QT INTERVAL: 404 MS
QT INTERVAL: 540 MS
QTC INTERVAL: 439 MS
QTC INTERVAL: 540 MS
RBC # BLD AUTO: 3.36 MILLION/UL (ref 3.88–5.62)
RBC # BLD AUTO: 3.4 MILLION/UL (ref 3.88–5.62)
RBC # BLD AUTO: 3.4 MILLION/UL (ref 3.88–5.62)
RBC # BLD AUTO: 3.43 MILLION/UL (ref 3.88–5.62)
RBC # BLD AUTO: 3.45 MILLION/UL (ref 3.88–5.62)
RBC # BLD AUTO: 3.48 MILLION/UL (ref 3.88–5.62)
RBC # BLD AUTO: 3.51 MILLION/UL (ref 3.88–5.62)
RBC # BLD AUTO: 3.56 MILLION/UL (ref 3.88–5.62)
RBC # BLD AUTO: 3.63 MILLION/UL (ref 3.88–5.62)
RBC # BLD AUTO: 3.65 MILLION/UL (ref 3.88–5.62)
RBC # BLD AUTO: 3.65 MILLION/UL (ref 3.88–5.62)
RBC # BLD AUTO: 3.69 MILLION/UL (ref 3.88–5.62)
RBC # BLD AUTO: 3.87 MILLION/UL (ref 3.88–5.62)
RBC # BLD AUTO: 3.97 MILLION/UL (ref 3.88–5.62)
RBC #/AREA URNS AUTO: ABNORMAL /HPF
RBC #/AREA URNS AUTO: ABNORMAL /HPF
RH BLD: POSITIVE
RYE IGE QN: NEGATIVE
SODIUM 24H UR-SCNC: 7 MOL/L
SODIUM SERPL-SCNC: 132 MMOL/L (ref 136–145)
SODIUM SERPL-SCNC: 132 MMOL/L (ref 136–145)
SODIUM SERPL-SCNC: 133 MMOL/L (ref 136–145)
SODIUM SERPL-SCNC: 134 MMOL/L (ref 136–145)
SODIUM SERPL-SCNC: 135 MMOL/L (ref 136–145)
SODIUM SERPL-SCNC: 136 MMOL/L (ref 136–145)
SODIUM SERPL-SCNC: 138 MMOL/L (ref 136–145)
SODIUM SERPL-SCNC: 139 MMOL/L (ref 136–145)
SP GR UR STRIP.AUTO: 1.01 (ref 1–1.03)
SP GR UR STRIP.AUTO: >=1.03 (ref 1–1.03)
SPECIMEN EXPIRATION DATE: NORMAL
T WAVE AXIS: 103 DEGREES
T WAVE AXIS: 90 DEGREES
TIBC SERPL-MCNC: 265 UG/DL (ref 250–450)
TOTAL CELLS COUNTED SPEC: 100
TROPONIN I SERPL-MCNC: <0.02 NG/ML
UNIT DISPENSE STATUS: NORMAL
UNIT PRODUCT CODE: NORMAL
UNIT RH: NORMAL
UROBILINOGEN UR QL STRIP.AUTO: 1 E.U./DL
UROBILINOGEN UR QL STRIP.AUTO: 2 E.U./DL
VENTRICULAR RATE: 60 BPM
VENTRICULAR RATE: 71 BPM
WBC # BLD AUTO: 12.09 THOUSAND/UL (ref 4.31–10.16)
WBC # BLD AUTO: 12.18 THOUSAND/UL (ref 4.31–10.16)
WBC # BLD AUTO: 12.61 THOUSAND/UL (ref 4.31–10.16)
WBC # BLD AUTO: 12.81 THOUSAND/UL (ref 4.31–10.16)
WBC # BLD AUTO: 4.39 THOUSAND/UL (ref 4.31–10.16)
WBC # BLD AUTO: 4.92 THOUSAND/UL (ref 4.31–10.16)
WBC # BLD AUTO: 7.4 THOUSAND/UL (ref 4.31–10.16)
WBC # BLD AUTO: 7.74 THOUSAND/UL (ref 4.31–10.16)
WBC # BLD AUTO: 8.02 THOUSAND/UL (ref 4.31–10.16)
WBC # BLD AUTO: 8.25 THOUSAND/UL (ref 4.31–10.16)
WBC # BLD AUTO: 8.26 THOUSAND/UL (ref 4.31–10.16)
WBC # BLD AUTO: 8.6 THOUSAND/UL (ref 4.31–10.16)
WBC # BLD AUTO: 9.43 THOUSAND/UL (ref 4.31–10.16)
WBC # BLD AUTO: 9.95 THOUSAND/UL (ref 4.31–10.16)
WBC #/AREA URNS AUTO: ABNORMAL /HPF
WBC #/AREA URNS AUTO: ABNORMAL /HPF

## 2020-01-01 PROCEDURE — 99285 EMERGENCY DEPT VISIT HI MDM: CPT | Performed by: EMERGENCY MEDICINE

## 2020-01-01 PROCEDURE — 84165 PROTEIN E-PHORESIS SERUM: CPT | Performed by: PATHOLOGY

## 2020-01-01 PROCEDURE — 99232 SBSQ HOSP IP/OBS MODERATE 35: CPT | Performed by: NURSE PRACTITIONER

## 2020-01-01 PROCEDURE — 88304 TISSUE EXAM BY PATHOLOGIST: CPT | Performed by: PATHOLOGY

## 2020-01-01 PROCEDURE — 80053 COMPREHEN METABOLIC PANEL: CPT | Performed by: INTERNAL MEDICINE

## 2020-01-01 PROCEDURE — 43262 ENDO CHOLANGIOPANCREATOGRAPH: CPT | Performed by: INTERNAL MEDICINE

## 2020-01-01 PROCEDURE — 99222 1ST HOSP IP/OBS MODERATE 55: CPT | Performed by: INTERNAL MEDICINE

## 2020-01-01 PROCEDURE — 33233 REMOVAL OF PM GENERATOR: CPT | Performed by: INTERNAL MEDICINE

## 2020-01-01 PROCEDURE — 99024 POSTOP FOLLOW-UP VISIT: CPT | Performed by: INTERNAL MEDICINE

## 2020-01-01 PROCEDURE — NC001 PR NO CHARGE: Performed by: SURGERY

## 2020-01-01 PROCEDURE — 84145 PROCALCITONIN (PCT): CPT | Performed by: INTERNAL MEDICINE

## 2020-01-01 PROCEDURE — 70450 CT HEAD/BRAIN W/O DYE: CPT

## 2020-01-01 PROCEDURE — 30233N1 TRANSFUSION OF NONAUTOLOGOUS RED BLOOD CELLS INTO PERIPHERAL VEIN, PERCUTANEOUS APPROACH: ICD-10-PCS | Performed by: INTERNAL MEDICINE

## 2020-01-01 PROCEDURE — 80048 BASIC METABOLIC PNL TOTAL CA: CPT

## 2020-01-01 PROCEDURE — 33235 REMOVAL PACEMAKER ELECTRODE: CPT | Performed by: INTERNAL MEDICINE

## 2020-01-01 PROCEDURE — 86901 BLOOD TYPING SEROLOGIC RH(D): CPT | Performed by: NURSE PRACTITIONER

## 2020-01-01 PROCEDURE — 99232 SBSQ HOSP IP/OBS MODERATE 35: CPT | Performed by: INTERNAL MEDICINE

## 2020-01-01 PROCEDURE — 93294 REM INTERROG EVL PM/LDLS PM: CPT | Performed by: INTERNAL MEDICINE

## 2020-01-01 PROCEDURE — 84145 PROCALCITONIN (PCT): CPT | Performed by: SURGERY

## 2020-01-01 PROCEDURE — 86850 RBC ANTIBODY SCREEN: CPT | Performed by: NURSE PRACTITIONER

## 2020-01-01 PROCEDURE — 86140 C-REACTIVE PROTEIN: CPT | Performed by: PHYSICIAN ASSISTANT

## 2020-01-01 PROCEDURE — 97163 PT EVAL HIGH COMPLEX 45 MIN: CPT

## 2020-01-01 PROCEDURE — 83520 IMMUNOASSAY QUANT NOS NONAB: CPT | Performed by: INTERNAL MEDICINE

## 2020-01-01 PROCEDURE — 99239 HOSP IP/OBS DSCHRG MGMT >30: CPT | Performed by: NURSE PRACTITIONER

## 2020-01-01 PROCEDURE — 71045 X-RAY EXAM CHEST 1 VIEW: CPT

## 2020-01-01 PROCEDURE — 86038 ANTINUCLEAR ANTIBODIES: CPT | Performed by: INTERNAL MEDICINE

## 2020-01-01 PROCEDURE — 93970 EXTREMITY STUDY: CPT

## 2020-01-01 PROCEDURE — 85652 RBC SED RATE AUTOMATED: CPT | Performed by: PHYSICIAN ASSISTANT

## 2020-01-01 PROCEDURE — 83605 ASSAY OF LACTIC ACID: CPT | Performed by: PHYSICIAN ASSISTANT

## 2020-01-01 PROCEDURE — 86255 FLUORESCENT ANTIBODY SCREEN: CPT | Performed by: INTERNAL MEDICINE

## 2020-01-01 PROCEDURE — 82948 REAGENT STRIP/BLOOD GLUCOSE: CPT

## 2020-01-01 PROCEDURE — C1894 INTRO/SHEATH, NON-LASER: HCPCS | Performed by: PHYSICIAN ASSISTANT

## 2020-01-01 PROCEDURE — 36415 COLL VENOUS BLD VENIPUNCTURE: CPT

## 2020-01-01 PROCEDURE — 93312 ECHO TRANSESOPHAGEAL: CPT

## 2020-01-01 PROCEDURE — 93320 DOPPLER ECHO COMPLETE: CPT | Performed by: INTERNAL MEDICINE

## 2020-01-01 PROCEDURE — 93010 ELECTROCARDIOGRAM REPORT: CPT | Performed by: INTERNAL MEDICINE

## 2020-01-01 PROCEDURE — 96365 THER/PROPH/DIAG IV INF INIT: CPT

## 2020-01-01 PROCEDURE — 93296 REM INTERROG EVL PM/IDS: CPT | Performed by: INTERNAL MEDICINE

## 2020-01-01 PROCEDURE — 99232 SBSQ HOSP IP/OBS MODERATE 35: CPT | Performed by: PHYSICIAN ASSISTANT

## 2020-01-01 PROCEDURE — 99223 1ST HOSP IP/OBS HIGH 75: CPT | Performed by: PSYCHIATRY & NEUROLOGY

## 2020-01-01 PROCEDURE — 92950 HEART/LUNG RESUSCITATION CPR: CPT | Performed by: EMERGENCY MEDICINE

## 2020-01-01 PROCEDURE — 84100 ASSAY OF PHOSPHORUS: CPT | Performed by: SURGERY

## 2020-01-01 PROCEDURE — 97166 OT EVAL MOD COMPLEX 45 MIN: CPT

## 2020-01-01 PROCEDURE — 70486 CT MAXILLOFACIAL W/O DYE: CPT

## 2020-01-01 PROCEDURE — 96361 HYDRATE IV INFUSION ADD-ON: CPT

## 2020-01-01 PROCEDURE — 81001 URINALYSIS AUTO W/SCOPE: CPT | Performed by: INTERNAL MEDICINE

## 2020-01-01 PROCEDURE — 02PA3MZ REMOVAL OF CARDIAC LEAD FROM HEART, PERCUTANEOUS APPROACH: ICD-10-PCS | Performed by: INTERNAL MEDICINE

## 2020-01-01 PROCEDURE — 96375 TX/PRO/DX INJ NEW DRUG ADDON: CPT

## 2020-01-01 PROCEDURE — 87040 BLOOD CULTURE FOR BACTERIA: CPT | Performed by: NURSE PRACTITIONER

## 2020-01-01 PROCEDURE — 93325 DOPPLER ECHO COLOR FLOW MAPG: CPT | Performed by: INTERNAL MEDICINE

## 2020-01-01 PROCEDURE — 02H63JZ INSERTION OF PACEMAKER LEAD INTO RIGHT ATRIUM, PERCUTANEOUS APPROACH: ICD-10-PCS | Performed by: INTERNAL MEDICINE

## 2020-01-01 PROCEDURE — 87077 CULTURE AEROBIC IDENTIFY: CPT | Performed by: PHYSICIAN ASSISTANT

## 2020-01-01 PROCEDURE — 83540 ASSAY OF IRON: CPT | Performed by: INTERNAL MEDICINE

## 2020-01-01 PROCEDURE — NC001 PR NO CHARGE: Performed by: STUDENT IN AN ORGANIZED HEALTH CARE EDUCATION/TRAINING PROGRAM

## 2020-01-01 PROCEDURE — 99232 SBSQ HOSP IP/OBS MODERATE 35: CPT | Performed by: FAMILY MEDICINE

## 2020-01-01 PROCEDURE — 93306 TTE W/DOPPLER COMPLETE: CPT

## 2020-01-01 PROCEDURE — 36569 INSJ PICC 5 YR+ W/O IMAGING: CPT

## 2020-01-01 PROCEDURE — 36415 COLL VENOUS BLD VENIPUNCTURE: CPT | Performed by: EMERGENCY MEDICINE

## 2020-01-01 PROCEDURE — 82977 ASSAY OF GGT: CPT | Performed by: INTERNAL MEDICINE

## 2020-01-01 PROCEDURE — 33208 INSRT HEART PM ATRIAL & VENT: CPT | Performed by: INTERNAL MEDICINE

## 2020-01-01 PROCEDURE — 99233 SBSQ HOSP IP/OBS HIGH 50: CPT | Performed by: INTERNAL MEDICINE

## 2020-01-01 PROCEDURE — 93882 EXTRACRANIAL UNI/LTD STUDY: CPT

## 2020-01-01 PROCEDURE — 80053 COMPREHEN METABOLIC PANEL: CPT | Performed by: EMERGENCY MEDICINE

## 2020-01-01 PROCEDURE — 85025 COMPLETE CBC W/AUTO DIFF WBC: CPT | Performed by: INTERNAL MEDICINE

## 2020-01-01 PROCEDURE — 02HK3JZ INSERTION OF PACEMAKER LEAD INTO RIGHT VENTRICLE, PERCUTANEOUS APPROACH: ICD-10-PCS | Performed by: INTERNAL MEDICINE

## 2020-01-01 PROCEDURE — 83550 IRON BINDING TEST: CPT | Performed by: INTERNAL MEDICINE

## 2020-01-01 PROCEDURE — 99223 1ST HOSP IP/OBS HIGH 75: CPT | Performed by: INTERNAL MEDICINE

## 2020-01-01 PROCEDURE — G1004 CDSM NDSC: HCPCS

## 2020-01-01 PROCEDURE — 83735 ASSAY OF MAGNESIUM: CPT

## 2020-01-01 PROCEDURE — 43264 ERCP REMOVE DUCT CALCULI: CPT | Performed by: INTERNAL MEDICINE

## 2020-01-01 PROCEDURE — 03BS0ZX EXCISION OF RIGHT TEMPORAL ARTERY, OPEN APPROACH, DIAGNOSTIC: ICD-10-PCS | Performed by: SURGERY

## 2020-01-01 PROCEDURE — 74176 CT ABD & PELVIS W/O CONTRAST: CPT

## 2020-01-01 PROCEDURE — 37609 LIGATION/BX TEMPORAL ARTERY: CPT | Performed by: SURGERY

## 2020-01-01 PROCEDURE — 80048 BASIC METABOLIC PNL TOTAL CA: CPT | Performed by: PHYSICIAN ASSISTANT

## 2020-01-01 PROCEDURE — 86920 COMPATIBILITY TEST SPIN: CPT

## 2020-01-01 PROCEDURE — 93005 ELECTROCARDIOGRAM TRACING: CPT

## 2020-01-01 PROCEDURE — 99024 POSTOP FOLLOW-UP VISIT: CPT | Performed by: SURGERY

## 2020-01-01 PROCEDURE — 0JH606Z INSERTION OF PACEMAKER, DUAL CHAMBER INTO CHEST SUBCUTANEOUS TISSUE AND FASCIA, OPEN APPROACH: ICD-10-PCS | Performed by: INTERNAL MEDICINE

## 2020-01-01 PROCEDURE — 83883 ASSAY NEPHELOMETRY NOT SPEC: CPT | Performed by: INTERNAL MEDICINE

## 2020-01-01 PROCEDURE — 99285 EMERGENCY DEPT VISIT HI MDM: CPT

## 2020-01-01 PROCEDURE — 84145 PROCALCITONIN (PCT): CPT | Performed by: PHYSICIAN ASSISTANT

## 2020-01-01 PROCEDURE — 80074 ACUTE HEPATITIS PANEL: CPT | Performed by: INTERNAL MEDICINE

## 2020-01-01 PROCEDURE — 80048 BASIC METABOLIC PNL TOTAL CA: CPT | Performed by: INTERNAL MEDICINE

## 2020-01-01 PROCEDURE — C1769 GUIDE WIRE: HCPCS

## 2020-01-01 PROCEDURE — 85610 PROTHROMBIN TIME: CPT | Performed by: NURSE PRACTITIONER

## 2020-01-01 PROCEDURE — C1786 PMKR, SINGLE, RATE-RESP: HCPCS

## 2020-01-01 PROCEDURE — 92950 HEART/LUNG RESUSCITATION CPR: CPT

## 2020-01-01 PROCEDURE — 99222 1ST HOSP IP/OBS MODERATE 55: CPT | Performed by: SURGERY

## 2020-01-01 PROCEDURE — C1751 CATH, INF, PER/CENT/MIDLINE: HCPCS

## 2020-01-01 PROCEDURE — 36593 DECLOT VASCULAR DEVICE: CPT

## 2020-01-01 PROCEDURE — 99024 POSTOP FOLLOW-UP VISIT: CPT | Performed by: NURSE PRACTITIONER

## 2020-01-01 PROCEDURE — 87040 BLOOD CULTURE FOR BACTERIA: CPT | Performed by: PHYSICIAN ASSISTANT

## 2020-01-01 PROCEDURE — 80053 COMPREHEN METABOLIC PANEL: CPT | Performed by: NURSE PRACTITIONER

## 2020-01-01 PROCEDURE — 99239 HOSP IP/OBS DSCHRG MGMT >30: CPT | Performed by: INTERNAL MEDICINE

## 2020-01-01 PROCEDURE — 83735 ASSAY OF MAGNESIUM: CPT | Performed by: NURSE PRACTITIONER

## 2020-01-01 PROCEDURE — 76705 ECHO EXAM OF ABDOMEN: CPT

## 2020-01-01 PROCEDURE — C1769 GUIDE WIRE: HCPCS | Performed by: PHYSICIAN ASSISTANT

## 2020-01-01 PROCEDURE — 85652 RBC SED RATE AUTOMATED: CPT | Performed by: NURSE PRACTITIONER

## 2020-01-01 PROCEDURE — C1898 LEAD, PMKR, OTHER THAN TRANS: HCPCS

## 2020-01-01 PROCEDURE — 85027 COMPLETE CBC AUTOMATED: CPT | Performed by: PHYSICIAN ASSISTANT

## 2020-01-01 PROCEDURE — 36598 INJ W/FLUOR EVAL CV DEVICE: CPT

## 2020-01-01 PROCEDURE — 84100 ASSAY OF PHOSPHORUS: CPT | Performed by: NURSE PRACTITIONER

## 2020-01-01 PROCEDURE — 99231 SBSQ HOSP IP/OBS SF/LOW 25: CPT | Performed by: INTERNAL MEDICINE

## 2020-01-01 PROCEDURE — 3E0102A INTRODUCTION OF ANTI-INFECTIVE ENVELOPE INTO SUBCUTANEOUS TISSUE, OPEN APPROACH: ICD-10-PCS | Performed by: INTERNAL MEDICINE

## 2020-01-01 PROCEDURE — 82746 ASSAY OF FOLIC ACID SERUM: CPT | Performed by: NURSE PRACTITIONER

## 2020-01-01 PROCEDURE — 88305 TISSUE EXAM BY PATHOLOGIST: CPT | Performed by: PATHOLOGY

## 2020-01-01 PROCEDURE — 85025 COMPLETE CBC W/AUTO DIFF WBC: CPT | Performed by: NURSE PRACTITIONER

## 2020-01-01 PROCEDURE — 86900 BLOOD TYPING SEROLOGIC ABO: CPT | Performed by: NURSE PRACTITIONER

## 2020-01-01 PROCEDURE — 76770 US EXAM ABDO BACK WALL COMP: CPT

## 2020-01-01 PROCEDURE — 97116 GAIT TRAINING THERAPY: CPT

## 2020-01-01 PROCEDURE — 36415 COLL VENOUS BLD VENIPUNCTURE: CPT | Performed by: PHYSICIAN ASSISTANT

## 2020-01-01 PROCEDURE — 93970 EXTREMITY STUDY: CPT | Performed by: SURGERY

## 2020-01-01 PROCEDURE — 99214 OFFICE O/P EST MOD 30 MIN: CPT | Performed by: PHYSICIAN ASSISTANT

## 2020-01-01 PROCEDURE — 36597 REPOSITION VENOUS CATHETER: CPT

## 2020-01-01 PROCEDURE — 83735 ASSAY OF MAGNESIUM: CPT | Performed by: PHYSICIAN ASSISTANT

## 2020-01-01 PROCEDURE — 85025 COMPLETE CBC W/AUTO DIFF WBC: CPT | Performed by: SURGERY

## 2020-01-01 PROCEDURE — 76000 FLUOROSCOPY <1 HR PHYS/QHP: CPT

## 2020-01-01 PROCEDURE — 85025 COMPLETE CBC W/AUTO DIFF WBC: CPT | Performed by: EMERGENCY MEDICINE

## 2020-01-01 PROCEDURE — C1892 INTRO/SHEATH,FIXED,PEEL-AWAY: HCPCS | Performed by: PHYSICIAN ASSISTANT

## 2020-01-01 PROCEDURE — 83735 ASSAY OF MAGNESIUM: CPT | Performed by: SURGERY

## 2020-01-01 PROCEDURE — 81001 URINALYSIS AUTO W/SCOPE: CPT | Performed by: SURGERY

## 2020-01-01 PROCEDURE — 85007 BL SMEAR W/DIFF WBC COUNT: CPT | Performed by: PHYSICIAN ASSISTANT

## 2020-01-01 PROCEDURE — 93312 ECHO TRANSESOPHAGEAL: CPT | Performed by: INTERNAL MEDICINE

## 2020-01-01 PROCEDURE — 47562 LAPAROSCOPIC CHOLECYSTECTOMY: CPT | Performed by: SURGERY

## 2020-01-01 PROCEDURE — 86850 RBC ANTIBODY SCREEN: CPT | Performed by: PHYSICIAN ASSISTANT

## 2020-01-01 PROCEDURE — NC001 PR NO CHARGE: Performed by: INTERNAL MEDICINE

## 2020-01-01 PROCEDURE — 93882 EXTRACRANIAL UNI/LTD STUDY: CPT | Performed by: SURGERY

## 2020-01-01 PROCEDURE — 84484 ASSAY OF TROPONIN QUANT: CPT | Performed by: EMERGENCY MEDICINE

## 2020-01-01 PROCEDURE — 83935 ASSAY OF URINE OSMOLALITY: CPT | Performed by: SURGERY

## 2020-01-01 PROCEDURE — 05HY33Z INSERTION OF INFUSION DEVICE INTO UPPER VEIN, PERCUTANEOUS APPROACH: ICD-10-PCS | Performed by: RADIOLOGY

## 2020-01-01 PROCEDURE — 84300 ASSAY OF URINE SODIUM: CPT | Performed by: SURGERY

## 2020-01-01 PROCEDURE — 71250 CT THORAX DX C-: CPT

## 2020-01-01 PROCEDURE — 99291 CRITICAL CARE FIRST HOUR: CPT | Performed by: EMERGENCY MEDICINE

## 2020-01-01 PROCEDURE — 99232 SBSQ HOSP IP/OBS MODERATE 35: CPT | Performed by: PSYCHIATRY & NEUROLOGY

## 2020-01-01 PROCEDURE — 86901 BLOOD TYPING SEROLOGIC RH(D): CPT | Performed by: PHYSICIAN ASSISTANT

## 2020-01-01 PROCEDURE — 93306 TTE W/DOPPLER COMPLETE: CPT | Performed by: INTERNAL MEDICINE

## 2020-01-01 PROCEDURE — 71046 X-RAY EXAM CHEST 2 VIEWS: CPT

## 2020-01-01 PROCEDURE — 99233 SBSQ HOSP IP/OBS HIGH 50: CPT | Performed by: PSYCHIATRY & NEUROLOGY

## 2020-01-01 PROCEDURE — 99220 PR INITIAL OBSERVATION CARE/DAY 70 MINUTES: CPT | Performed by: INTERNAL MEDICINE

## 2020-01-01 PROCEDURE — 33210 INSERT ELECTRD/PM CATH SNGL: CPT | Performed by: PHYSICIAN ASSISTANT

## 2020-01-01 PROCEDURE — 33208 INSRT HEART PM ATRIAL & VENT: CPT | Performed by: PHYSICIAN ASSISTANT

## 2020-01-01 PROCEDURE — 03BT0ZX EXCISION OF LEFT TEMPORAL ARTERY, OPEN APPROACH, DIAGNOSTIC: ICD-10-PCS | Performed by: SURGERY

## 2020-01-01 PROCEDURE — NC001 PR NO CHARGE: Performed by: NURSE PRACTITIONER

## 2020-01-01 PROCEDURE — 43275 ERCP REMOVE FORGN BODY DUCT: CPT | Performed by: INTERNAL MEDICINE

## 2020-01-01 PROCEDURE — 84166 PROTEIN E-PHORESIS/URINE/CSF: CPT | Performed by: INTERNAL MEDICINE

## 2020-01-01 PROCEDURE — 84165 PROTEIN E-PHORESIS SERUM: CPT | Performed by: SURGERY

## 2020-01-01 PROCEDURE — 80053 COMPREHEN METABOLIC PANEL: CPT | Performed by: PHYSICIAN ASSISTANT

## 2020-01-01 PROCEDURE — 33235 REMOVAL PACEMAKER ELECTRODE: CPT | Performed by: PHYSICIAN ASSISTANT

## 2020-01-01 PROCEDURE — 80053 COMPREHEN METABOLIC PANEL: CPT | Performed by: SURGERY

## 2020-01-01 PROCEDURE — 0JPT0PZ REMOVAL OF CARDIAC RHYTHM RELATED DEVICE FROM TRUNK SUBCUTANEOUS TISSUE AND FASCIA, OPEN APPROACH: ICD-10-PCS | Performed by: INTERNAL MEDICINE

## 2020-01-01 PROCEDURE — 82728 ASSAY OF FERRITIN: CPT | Performed by: INTERNAL MEDICINE

## 2020-01-01 PROCEDURE — 74328 X-RAY BILE DUCT ENDOSCOPY: CPT

## 2020-01-01 PROCEDURE — 84166 PROTEIN E-PHORESIS/URINE/CSF: CPT | Performed by: PATHOLOGY

## 2020-01-01 PROCEDURE — 86900 BLOOD TYPING SEROLOGIC ABO: CPT | Performed by: PHYSICIAN ASSISTANT

## 2020-01-01 PROCEDURE — 83735 ASSAY OF MAGNESIUM: CPT | Performed by: INTERNAL MEDICINE

## 2020-01-01 PROCEDURE — 82570 ASSAY OF URINE CREATININE: CPT | Performed by: SURGERY

## 2020-01-01 PROCEDURE — 93000 ELECTROCARDIOGRAM COMPLETE: CPT | Performed by: INTERNAL MEDICINE

## 2020-01-01 PROCEDURE — 99214 OFFICE O/P EST MOD 30 MIN: CPT | Performed by: INTERNAL MEDICINE

## 2020-01-01 RX ORDER — LABETALOL 20 MG/4 ML (5 MG/ML) INTRAVENOUS SYRINGE
10 EVERY 6 HOURS PRN
Status: CANCELLED | OUTPATIENT
Start: 2020-01-01

## 2020-01-01 RX ORDER — GENTAMICIN SULFATE 40 MG/ML
INJECTION, SOLUTION INTRAMUSCULAR; INTRAVENOUS CODE/TRAUMA/SEDATION MEDICATION
Status: COMPLETED | OUTPATIENT
Start: 2020-01-01 | End: 2020-01-01

## 2020-01-01 RX ORDER — FUROSEMIDE 80 MG
80 TABLET ORAL 2 TIMES DAILY
Qty: 180 TABLET | Refills: 4 | Status: SHIPPED | OUTPATIENT
Start: 2020-01-01

## 2020-01-01 RX ORDER — KETAMINE HYDROCHLORIDE 50 MG/ML
INJECTION, SOLUTION, CONCENTRATE INTRAMUSCULAR; INTRAVENOUS AS NEEDED
Status: DISCONTINUED | OUTPATIENT
Start: 2020-01-01 | End: 2020-01-01

## 2020-01-01 RX ORDER — NORTRIPTYLINE HYDROCHLORIDE 10 MG/1
10 CAPSULE ORAL
Status: DISCONTINUED | OUTPATIENT
Start: 2020-01-01 | End: 2020-01-01 | Stop reason: HOSPADM

## 2020-01-01 RX ORDER — PANTOPRAZOLE SODIUM 40 MG/1
40 TABLET, DELAYED RELEASE ORAL
Status: CANCELLED | OUTPATIENT
Start: 2020-01-01

## 2020-01-01 RX ORDER — FLUTICASONE PROPIONATE 50 MCG
1 SPRAY, SUSPENSION (ML) NASAL DAILY
Status: DISCONTINUED | OUTPATIENT
Start: 2020-01-01 | End: 2020-01-01 | Stop reason: HOSPADM

## 2020-01-01 RX ORDER — CHLORHEXIDINE GLUCONATE 0.12 MG/ML
15 RINSE ORAL ONCE
Status: COMPLETED | OUTPATIENT
Start: 2020-01-01 | End: 2020-01-01

## 2020-01-01 RX ORDER — DIPHENHYDRAMINE HYDROCHLORIDE 50 MG/ML
25 INJECTION INTRAMUSCULAR; INTRAVENOUS EVERY 8 HOURS PRN
Status: DISCONTINUED | OUTPATIENT
Start: 2020-01-01 | End: 2020-01-01

## 2020-01-01 RX ORDER — SODIUM CHLORIDE 9 MG/ML
75 INJECTION, SOLUTION INTRAVENOUS CONTINUOUS
Status: DISCONTINUED | OUTPATIENT
Start: 2020-01-01 | End: 2020-01-01

## 2020-01-01 RX ORDER — SODIUM CHLORIDE 9 MG/ML
INJECTION, SOLUTION INTRAVENOUS CONTINUOUS PRN
Status: DISCONTINUED | OUTPATIENT
Start: 2020-01-01 | End: 2020-01-01

## 2020-01-01 RX ORDER — ACETAMINOPHEN 325 MG/1
650 TABLET ORAL EVERY 6 HOURS PRN
Status: DISCONTINUED | OUTPATIENT
Start: 2020-01-01 | End: 2020-01-01

## 2020-01-01 RX ORDER — SODIUM CHLORIDE 9 MG/ML
20 INJECTION, SOLUTION INTRAVENOUS ONCE
Status: COMPLETED | OUTPATIENT
Start: 2020-01-01 | End: 2020-01-01

## 2020-01-01 RX ORDER — SODIUM CHLORIDE 9 MG/ML
20 INJECTION, SOLUTION INTRAVENOUS ONCE
Status: CANCELLED | OUTPATIENT
Start: 2020-01-01

## 2020-01-01 RX ORDER — DIPHENHYDRAMINE HYDROCHLORIDE 50 MG/ML
25 INJECTION INTRAMUSCULAR; INTRAVENOUS EVERY 8 HOURS
Status: DISCONTINUED | OUTPATIENT
Start: 2020-01-01 | End: 2020-01-01

## 2020-01-01 RX ORDER — FOLIC ACID 1 MG/1
1 TABLET ORAL DAILY
Status: DISCONTINUED | OUTPATIENT
Start: 2020-01-01 | End: 2020-01-01 | Stop reason: HOSPADM

## 2020-01-01 RX ORDER — DIPHENHYDRAMINE HYDROCHLORIDE 50 MG/ML
25 INJECTION INTRAMUSCULAR; INTRAVENOUS ONCE
Status: COMPLETED | OUTPATIENT
Start: 2020-01-01 | End: 2020-01-01

## 2020-01-01 RX ORDER — LIDOCAINE HYDROCHLORIDE 10 MG/ML
INJECTION, SOLUTION EPIDURAL; INFILTRATION; INTRACAUDAL; PERINEURAL AS NEEDED
Status: DISCONTINUED | OUTPATIENT
Start: 2020-01-01 | End: 2020-01-01 | Stop reason: HOSPADM

## 2020-01-01 RX ORDER — POTASSIUM CHLORIDE 20 MEQ/1
20 TABLET, EXTENDED RELEASE ORAL 2 TIMES DAILY
Qty: 60 TABLET | Refills: 1 | Status: SHIPPED | OUTPATIENT
Start: 2020-01-01

## 2020-01-01 RX ORDER — PANTOPRAZOLE SODIUM 40 MG/1
40 TABLET, DELAYED RELEASE ORAL
Status: DISCONTINUED | OUTPATIENT
Start: 2020-01-01 | End: 2020-01-01 | Stop reason: HOSPADM

## 2020-01-01 RX ORDER — FERROUS SULFATE 325(65) MG
325 TABLET ORAL
Status: CANCELLED | OUTPATIENT
Start: 2020-01-01

## 2020-01-01 RX ORDER — SENNOSIDES 8.6 MG
1 TABLET ORAL
Status: DISCONTINUED | OUTPATIENT
Start: 2020-01-01 | End: 2020-01-01 | Stop reason: HOSPADM

## 2020-01-01 RX ORDER — ACETAMINOPHEN 325 MG/1
650 TABLET ORAL EVERY 6 HOURS PRN
Status: DISCONTINUED | OUTPATIENT
Start: 2020-01-01 | End: 2020-01-01 | Stop reason: HOSPADM

## 2020-01-01 RX ORDER — ONDANSETRON 2 MG/ML
4 INJECTION INTRAMUSCULAR; INTRAVENOUS ONCE AS NEEDED
Status: DISCONTINUED | OUTPATIENT
Start: 2020-01-01 | End: 2020-01-01 | Stop reason: HOSPADM

## 2020-01-01 RX ORDER — LANOLIN ALCOHOL/MO/W.PET/CERES
6 CREAM (GRAM) TOPICAL
Status: CANCELLED | OUTPATIENT
Start: 2020-01-01

## 2020-01-01 RX ORDER — ATORVASTATIN CALCIUM 40 MG/1
40 TABLET, FILM COATED ORAL DAILY
Status: DISCONTINUED | OUTPATIENT
Start: 2020-01-01 | End: 2020-01-01 | Stop reason: HOSPADM

## 2020-01-01 RX ORDER — ROCURONIUM BROMIDE 10 MG/ML
INJECTION, SOLUTION INTRAVENOUS AS NEEDED
Status: DISCONTINUED | OUTPATIENT
Start: 2020-01-01 | End: 2020-01-01 | Stop reason: SURG

## 2020-01-01 RX ORDER — LABETALOL 20 MG/4 ML (5 MG/ML) INTRAVENOUS SYRINGE
10 EVERY 6 HOURS PRN
Status: DISCONTINUED | OUTPATIENT
Start: 2020-01-01 | End: 2020-01-01 | Stop reason: HOSPADM

## 2020-01-01 RX ORDER — SODIUM CHLORIDE 9 MG/ML
50 INJECTION, SOLUTION INTRAVENOUS CONTINUOUS
Status: DISCONTINUED | OUTPATIENT
Start: 2020-01-01 | End: 2020-01-01

## 2020-01-01 RX ORDER — FOLIC ACID 1 MG/1
1 TABLET ORAL DAILY
Status: CANCELLED | OUTPATIENT
Start: 2020-01-01

## 2020-01-01 RX ORDER — LANOLIN ALCOHOL/MO/W.PET/CERES
400 CREAM (GRAM) TOPICAL DAILY
Status: DISCONTINUED | OUTPATIENT
Start: 2020-01-01 | End: 2020-01-01 | Stop reason: HOSPADM

## 2020-01-01 RX ORDER — THIAMINE MONONITRATE (VIT B1) 100 MG
100 TABLET ORAL DAILY
Status: DISCONTINUED | OUTPATIENT
Start: 2020-01-01 | End: 2020-01-01 | Stop reason: HOSPADM

## 2020-01-01 RX ORDER — AMLODIPINE BESYLATE 5 MG/1
5 TABLET ORAL DAILY
Status: CANCELLED | OUTPATIENT
Start: 2020-01-01

## 2020-01-01 RX ORDER — OXYCODONE HYDROCHLORIDE 5 MG/1
5 TABLET ORAL EVERY 4 HOURS PRN
Status: DISCONTINUED | OUTPATIENT
Start: 2020-01-01 | End: 2020-01-01 | Stop reason: HOSPADM

## 2020-01-01 RX ORDER — VANCOMYCIN HYDROCHLORIDE 1 G/200ML
1000 INJECTION, SOLUTION INTRAVENOUS ONCE
Status: COMPLETED | OUTPATIENT
Start: 2020-01-01 | End: 2020-01-01

## 2020-01-01 RX ORDER — FERROUS SULFATE 325(65) MG
325 TABLET ORAL EVERY OTHER DAY
Status: DISCONTINUED | OUTPATIENT
Start: 2020-01-01 | End: 2020-01-01 | Stop reason: HOSPADM

## 2020-01-01 RX ORDER — SACCHAROMYCES BOULARDII 250 MG
250 CAPSULE ORAL 2 TIMES DAILY
Status: DISCONTINUED | OUTPATIENT
Start: 2020-01-01 | End: 2020-01-01 | Stop reason: HOSPADM

## 2020-01-01 RX ORDER — PROPOFOL 10 MG/ML
INJECTION, EMULSION INTRAVENOUS CONTINUOUS PRN
Status: DISCONTINUED | OUTPATIENT
Start: 2020-01-01 | End: 2020-01-01

## 2020-01-01 RX ORDER — ATORVASTATIN CALCIUM 40 MG/1
40 TABLET, FILM COATED ORAL
Status: DISCONTINUED | OUTPATIENT
Start: 2020-01-01 | End: 2020-01-01 | Stop reason: HOSPADM

## 2020-01-01 RX ORDER — LIDOCAINE HYDROCHLORIDE 10 MG/ML
0.5 INJECTION, SOLUTION EPIDURAL; INFILTRATION; INTRACAUDAL; PERINEURAL ONCE AS NEEDED
Status: DISCONTINUED | OUTPATIENT
Start: 2020-01-01 | End: 2020-01-01 | Stop reason: HOSPADM

## 2020-01-01 RX ORDER — POTASSIUM CHLORIDE 20 MEQ/1
40 TABLET, EXTENDED RELEASE ORAL ONCE
Status: COMPLETED | OUTPATIENT
Start: 2020-01-01 | End: 2020-01-01

## 2020-01-01 RX ORDER — LISINOPRIL 5 MG/1
5 TABLET ORAL DAILY
Status: DISCONTINUED | OUTPATIENT
Start: 2020-01-01 | End: 2020-01-01

## 2020-01-01 RX ORDER — METOLAZONE 5 MG/1
5 TABLET ORAL AS NEEDED
Qty: 20 TABLET | Refills: 1 | Status: SHIPPED | OUTPATIENT
Start: 2020-01-01

## 2020-01-01 RX ORDER — SODIUM CHLORIDE, SODIUM LACTATE, POTASSIUM CHLORIDE, CALCIUM CHLORIDE 600; 310; 30; 20 MG/100ML; MG/100ML; MG/100ML; MG/100ML
INJECTION, SOLUTION INTRAVENOUS CONTINUOUS PRN
Status: DISCONTINUED | OUTPATIENT
Start: 2020-01-01 | End: 2020-01-01

## 2020-01-01 RX ORDER — DOCUSATE SODIUM 100 MG/1
100 CAPSULE, LIQUID FILLED ORAL 2 TIMES DAILY
Status: CANCELLED | OUTPATIENT
Start: 2020-01-01

## 2020-01-01 RX ORDER — MAGNESIUM HYDROXIDE 1200 MG/15ML
LIQUID ORAL AS NEEDED
Status: DISCONTINUED | OUTPATIENT
Start: 2020-01-01 | End: 2020-01-01 | Stop reason: HOSPADM

## 2020-01-01 RX ORDER — LIDOCAINE HYDROCHLORIDE 10 MG/ML
INJECTION, SOLUTION EPIDURAL; INFILTRATION; INTRACAUDAL; PERINEURAL CODE/TRAUMA/SEDATION MEDICATION
Status: COMPLETED | OUTPATIENT
Start: 2020-01-01 | End: 2020-01-01

## 2020-01-01 RX ORDER — LANOLIN ALCOHOL/MO/W.PET/CERES
3 CREAM (GRAM) TOPICAL ONCE
Status: COMPLETED | OUTPATIENT
Start: 2020-01-01 | End: 2020-01-01

## 2020-01-01 RX ORDER — DOCUSATE SODIUM 100 MG/1
100 CAPSULE, LIQUID FILLED ORAL 2 TIMES DAILY
Status: DISCONTINUED | OUTPATIENT
Start: 2020-01-01 | End: 2020-01-01 | Stop reason: HOSPADM

## 2020-01-01 RX ORDER — SACCHAROMYCES BOULARDII 250 MG
250 CAPSULE ORAL 2 TIMES DAILY
Status: CANCELLED | OUTPATIENT
Start: 2020-01-01

## 2020-01-01 RX ORDER — CHLORHEXIDINE GLUCONATE 0.12 MG/ML
15 RINSE ORAL ONCE
Status: DISCONTINUED | OUTPATIENT
Start: 2020-01-01 | End: 2020-01-01

## 2020-01-01 RX ORDER — MAGNESIUM SULFATE HEPTAHYDRATE 40 MG/ML
2 INJECTION, SOLUTION INTRAVENOUS
Status: COMPLETED | OUTPATIENT
Start: 2020-01-01 | End: 2020-01-01

## 2020-01-01 RX ORDER — ESMOLOL HYDROCHLORIDE 10 MG/ML
INJECTION INTRAVENOUS AS NEEDED
Status: DISCONTINUED | OUTPATIENT
Start: 2020-01-01 | End: 2020-01-01 | Stop reason: SURG

## 2020-01-01 RX ORDER — AMLODIPINE BESYLATE 5 MG/1
5 TABLET ORAL DAILY
Status: DISCONTINUED | OUTPATIENT
Start: 2020-01-01 | End: 2020-01-01 | Stop reason: HOSPADM

## 2020-01-01 RX ORDER — GABAPENTIN 300 MG/1
300 CAPSULE ORAL 3 TIMES DAILY
Status: DISCONTINUED | OUTPATIENT
Start: 2020-01-01 | End: 2020-01-01

## 2020-01-01 RX ORDER — SENNOSIDES 8.6 MG
1 TABLET ORAL
Status: CANCELLED | OUTPATIENT
Start: 2020-01-01

## 2020-01-01 RX ORDER — GABAPENTIN 300 MG/1
300 CAPSULE ORAL
Status: CANCELLED | OUTPATIENT
Start: 2020-01-01

## 2020-01-01 RX ORDER — FERROUS SULFATE 325(65) MG
325 TABLET ORAL
Status: DISCONTINUED | OUTPATIENT
Start: 2020-01-01 | End: 2020-01-01 | Stop reason: HOSPADM

## 2020-01-01 RX ORDER — ACETAMINOPHEN 325 MG/1
975 TABLET ORAL EVERY 8 HOURS SCHEDULED
Status: DISCONTINUED | OUTPATIENT
Start: 2020-01-01 | End: 2020-01-01

## 2020-01-01 RX ORDER — LISINOPRIL 5 MG/1
5 TABLET ORAL DAILY
Status: DISCONTINUED | OUTPATIENT
Start: 2020-01-01 | End: 2020-01-01 | Stop reason: HOSPADM

## 2020-01-01 RX ORDER — FERROUS SULFATE 325(65) MG
325 TABLET ORAL
Status: DISCONTINUED | OUTPATIENT
Start: 2020-01-01 | End: 2020-01-01

## 2020-01-01 RX ORDER — ACETAMINOPHEN 325 MG/1
650 TABLET ORAL EVERY 6 HOURS PRN
Status: CANCELLED | OUTPATIENT
Start: 2020-01-01

## 2020-01-01 RX ORDER — SUCCINYLCHOLINE/SOD CL,ISO/PF 100 MG/5ML
SYRINGE (ML) INTRAVENOUS AS NEEDED
Status: DISCONTINUED | OUTPATIENT
Start: 2020-01-01 | End: 2020-01-01 | Stop reason: SURG

## 2020-01-01 RX ORDER — AMLODIPINE BESYLATE 5 MG/1
5 TABLET ORAL DAILY
Status: DISCONTINUED | OUTPATIENT
Start: 2020-01-01 | End: 2020-01-01

## 2020-01-01 RX ORDER — PROPOFOL 10 MG/ML
INJECTION, EMULSION INTRAVENOUS AS NEEDED
Status: DISCONTINUED | OUTPATIENT
Start: 2020-01-01 | End: 2020-01-01 | Stop reason: SURG

## 2020-01-01 RX ORDER — ONDANSETRON 2 MG/ML
INJECTION INTRAMUSCULAR; INTRAVENOUS AS NEEDED
Status: DISCONTINUED | OUTPATIENT
Start: 2020-01-01 | End: 2020-01-01 | Stop reason: SURG

## 2020-01-01 RX ORDER — NEOSTIGMINE METHYLSULFATE 1 MG/ML
INJECTION INTRAVENOUS AS NEEDED
Status: DISCONTINUED | OUTPATIENT
Start: 2020-01-01 | End: 2020-01-01 | Stop reason: SURG

## 2020-01-01 RX ORDER — LIDOCAINE HYDROCHLORIDE 10 MG/ML
INJECTION, SOLUTION EPIDURAL; INFILTRATION; INTRACAUDAL; PERINEURAL AS NEEDED
Status: DISCONTINUED | OUTPATIENT
Start: 2020-01-01 | End: 2020-01-01 | Stop reason: SURG

## 2020-01-01 RX ORDER — OXYCODONE HYDROCHLORIDE 5 MG/1
5 TABLET ORAL EVERY 4 HOURS PRN
Qty: 30 TABLET | Refills: 0 | Status: SHIPPED | OUTPATIENT
Start: 2020-01-01 | End: 2020-01-01

## 2020-01-01 RX ORDER — BUPIVACAINE HYDROCHLORIDE 2.5 MG/ML
INJECTION, SOLUTION EPIDURAL; INFILTRATION; INTRACAUDAL AS NEEDED
Status: DISCONTINUED | OUTPATIENT
Start: 2020-01-01 | End: 2020-01-01 | Stop reason: HOSPADM

## 2020-01-01 RX ORDER — FLUTICASONE PROPIONATE 50 MCG
1 SPRAY, SUSPENSION (ML) NASAL DAILY
Status: CANCELLED | OUTPATIENT
Start: 2020-01-01

## 2020-01-01 RX ORDER — FENTANYL CITRATE 50 UG/ML
INJECTION, SOLUTION INTRAMUSCULAR; INTRAVENOUS AS NEEDED
Status: DISCONTINUED | OUTPATIENT
Start: 2020-01-01 | End: 2020-01-01 | Stop reason: SURG

## 2020-01-01 RX ORDER — CEFAZOLIN SODIUM 2 G/50ML
SOLUTION INTRAVENOUS AS NEEDED
Status: DISCONTINUED | OUTPATIENT
Start: 2020-01-01 | End: 2020-01-01 | Stop reason: SURG

## 2020-01-01 RX ORDER — KETOROLAC TROMETHAMINE 30 MG/ML
30 INJECTION, SOLUTION INTRAMUSCULAR; INTRAVENOUS ONCE
Status: COMPLETED | OUTPATIENT
Start: 2020-01-01 | End: 2020-01-01

## 2020-01-01 RX ORDER — METOCLOPRAMIDE HYDROCHLORIDE 5 MG/ML
10 INJECTION INTRAMUSCULAR; INTRAVENOUS ONCE
Status: COMPLETED | OUTPATIENT
Start: 2020-01-01 | End: 2020-01-01

## 2020-01-01 RX ORDER — NORTRIPTYLINE HYDROCHLORIDE 10 MG/1
10 CAPSULE ORAL
Qty: 30 CAPSULE | Refills: 0 | Status: SHIPPED | OUTPATIENT
Start: 2020-01-01

## 2020-01-01 RX ORDER — SODIUM CHLORIDE, SODIUM LACTATE, POTASSIUM CHLORIDE, CALCIUM CHLORIDE 600; 310; 30; 20 MG/100ML; MG/100ML; MG/100ML; MG/100ML
INJECTION, SOLUTION INTRAVENOUS CONTINUOUS PRN
Status: DISCONTINUED | OUTPATIENT
Start: 2020-01-01 | End: 2020-01-01 | Stop reason: SURG

## 2020-01-01 RX ORDER — FENTANYL CITRATE 50 UG/ML
INJECTION, SOLUTION INTRAMUSCULAR; INTRAVENOUS AS NEEDED
Status: DISCONTINUED | OUTPATIENT
Start: 2020-01-01 | End: 2020-01-01

## 2020-01-01 RX ORDER — EPINEPHRINE 0.1 MG/ML
4 SYRINGE (ML) INJECTION ONCE
Status: COMPLETED | OUTPATIENT
Start: 2020-01-01 | End: 2020-01-01

## 2020-01-01 RX ORDER — GLYCOPYRROLATE 0.2 MG/ML
INJECTION INTRAMUSCULAR; INTRAVENOUS AS NEEDED
Status: DISCONTINUED | OUTPATIENT
Start: 2020-01-01 | End: 2020-01-01 | Stop reason: SURG

## 2020-01-01 RX ORDER — PROPOFOL 10 MG/ML
INJECTION, EMULSION INTRAVENOUS AS NEEDED
Status: DISCONTINUED | OUTPATIENT
Start: 2020-01-01 | End: 2020-01-01

## 2020-01-01 RX ORDER — SODIUM CHLORIDE, SODIUM LACTATE, POTASSIUM CHLORIDE, CALCIUM CHLORIDE 600; 310; 30; 20 MG/100ML; MG/100ML; MG/100ML; MG/100ML
75 INJECTION, SOLUTION INTRAVENOUS CONTINUOUS
Status: DISCONTINUED | OUTPATIENT
Start: 2020-01-01 | End: 2020-01-01 | Stop reason: HOSPADM

## 2020-01-01 RX ORDER — CHLORAL HYDRATE 500 MG
1000 CAPSULE ORAL DAILY
Status: DISCONTINUED | OUTPATIENT
Start: 2020-01-01 | End: 2020-01-01 | Stop reason: HOSPADM

## 2020-01-01 RX ORDER — ATORVASTATIN CALCIUM 40 MG/1
40 TABLET, FILM COATED ORAL DAILY
Status: CANCELLED | OUTPATIENT
Start: 2020-01-01

## 2020-01-01 RX ORDER — LIDOCAINE HYDROCHLORIDE 10 MG/ML
INJECTION, SOLUTION EPIDURAL; INFILTRATION; INTRACAUDAL; PERINEURAL AS NEEDED
Status: DISCONTINUED | OUTPATIENT
Start: 2020-01-01 | End: 2020-01-01

## 2020-01-01 RX ORDER — METOCLOPRAMIDE HYDROCHLORIDE 5 MG/ML
10 INJECTION INTRAMUSCULAR; INTRAVENOUS EVERY 8 HOURS SCHEDULED
Status: DISCONTINUED | OUTPATIENT
Start: 2020-01-01 | End: 2020-01-01

## 2020-01-01 RX ORDER — MAGNESIUM SULFATE HEPTAHYDRATE 40 MG/ML
2 INJECTION, SOLUTION INTRAVENOUS ONCE
Status: COMPLETED | OUTPATIENT
Start: 2020-01-01 | End: 2020-01-01

## 2020-01-01 RX ORDER — ACETAMINOPHEN 325 MG/1
975 TABLET ORAL ONCE
Status: COMPLETED | OUTPATIENT
Start: 2020-01-01 | End: 2020-01-01

## 2020-01-01 RX ORDER — GABAPENTIN 300 MG/1
300 CAPSULE ORAL
Status: DISCONTINUED | OUTPATIENT
Start: 2020-01-01 | End: 2020-01-01 | Stop reason: HOSPADM

## 2020-01-01 RX ORDER — HYDROMORPHONE HCL/PF 1 MG/ML
0.2 SYRINGE (ML) INJECTION
Status: DISCONTINUED | OUTPATIENT
Start: 2020-01-01 | End: 2020-01-01 | Stop reason: HOSPADM

## 2020-01-01 RX ORDER — DEXAMETHASONE SODIUM PHOSPHATE 10 MG/ML
INJECTION, SOLUTION INTRAMUSCULAR; INTRAVENOUS AS NEEDED
Status: DISCONTINUED | OUTPATIENT
Start: 2020-01-01 | End: 2020-01-01 | Stop reason: SURG

## 2020-01-01 RX ORDER — FENTANYL CITRATE/PF 50 MCG/ML
25 SYRINGE (ML) INJECTION
Status: COMPLETED | OUTPATIENT
Start: 2020-01-01 | End: 2020-01-01

## 2020-01-01 RX ORDER — LANOLIN ALCOHOL/MO/W.PET/CERES
6 CREAM (GRAM) TOPICAL
Status: DISCONTINUED | OUTPATIENT
Start: 2020-01-01 | End: 2020-01-01 | Stop reason: HOSPADM

## 2020-01-01 RX ORDER — NORTRIPTYLINE HYDROCHLORIDE 10 MG/1
10 CAPSULE ORAL
Status: CANCELLED | OUTPATIENT
Start: 2020-01-01

## 2020-01-01 RX ORDER — GABAPENTIN 300 MG/1
300 CAPSULE ORAL
Qty: 30 CAPSULE | Refills: 0 | Status: SHIPPED | OUTPATIENT
Start: 2020-01-01

## 2020-01-01 RX ORDER — AMLODIPINE BESYLATE 5 MG/1
5 TABLET ORAL DAILY
Qty: 30 TABLET | Refills: 0 | Status: SHIPPED | OUTPATIENT
Start: 2020-01-01

## 2020-01-01 RX ORDER — SODIUM CHLORIDE, SODIUM LACTATE, POTASSIUM CHLORIDE, CALCIUM CHLORIDE 600; 310; 30; 20 MG/100ML; MG/100ML; MG/100ML; MG/100ML
125 INJECTION, SOLUTION INTRAVENOUS CONTINUOUS
Status: DISCONTINUED | OUTPATIENT
Start: 2020-01-01 | End: 2020-01-01 | Stop reason: HOSPADM

## 2020-01-01 RX ORDER — OXYCODONE HYDROCHLORIDE 10 MG/1
10 TABLET ORAL EVERY 4 HOURS PRN
Status: DISCONTINUED | OUTPATIENT
Start: 2020-01-01 | End: 2020-01-01 | Stop reason: HOSPADM

## 2020-01-01 RX ORDER — SODIUM CHLORIDE 9 MG/ML
INJECTION, SOLUTION INTRAVENOUS
Status: COMPLETED | OUTPATIENT
Start: 2020-01-01 | End: 2020-01-01

## 2020-01-01 RX ORDER — SODIUM CHLORIDE, SODIUM GLUCONATE, SODIUM ACETATE, POTASSIUM CHLORIDE, MAGNESIUM CHLORIDE, SODIUM PHOSPHATE, DIBASIC, AND POTASSIUM PHOSPHATE .53; .5; .37; .037; .03; .012; .00082 G/100ML; G/100ML; G/100ML; G/100ML; G/100ML; G/100ML; G/100ML
50 INJECTION, SOLUTION INTRAVENOUS CONTINUOUS
Status: DISCONTINUED | OUTPATIENT
Start: 2020-01-01 | End: 2020-01-01

## 2020-01-01 RX ADMIN — DOCUSATE SODIUM 100 MG: 100 CAPSULE ORAL at 09:28

## 2020-01-01 RX ADMIN — PANTOPRAZOLE SODIUM 40 MG: 40 TABLET, DELAYED RELEASE ORAL at 05:20

## 2020-01-01 RX ADMIN — DOCUSATE SODIUM 100 MG: 100 CAPSULE ORAL at 17:29

## 2020-01-01 RX ADMIN — PANTOPRAZOLE SODIUM 40 MG: 40 TABLET, DELAYED RELEASE ORAL at 12:49

## 2020-01-01 RX ADMIN — APIXABAN 5 MG: 5 TABLET, FILM COATED ORAL at 22:00

## 2020-01-01 RX ADMIN — ATORVASTATIN CALCIUM 40 MG: 40 TABLET, FILM COATED ORAL at 17:13

## 2020-01-01 RX ADMIN — Medication 250 MG: at 17:29

## 2020-01-01 RX ADMIN — FOLIC ACID 1 MG: 1 TABLET ORAL at 08:11

## 2020-01-01 RX ADMIN — FLUTICASONE PROPIONATE 1 SPRAY: 50 SPRAY, METERED NASAL at 08:17

## 2020-01-01 RX ADMIN — NORTRIPTYLINE HYDROCHLORIDE 10 MG: 10 CAPSULE ORAL at 21:08

## 2020-01-01 RX ADMIN — NORTRIPTYLINE HYDROCHLORIDE 10 MG: 10 CAPSULE ORAL at 21:14

## 2020-01-01 RX ADMIN — FOLIC ACID 1 MG: 1 TABLET ORAL at 08:26

## 2020-01-01 RX ADMIN — ATORVASTATIN CALCIUM 40 MG: 40 TABLET, FILM COATED ORAL at 08:05

## 2020-01-01 RX ADMIN — APIXABAN 5 MG: 5 TABLET, FILM COATED ORAL at 21:20

## 2020-01-01 RX ADMIN — FENTANYL CITRATE 25 MCG: 50 INJECTION, SOLUTION INTRAMUSCULAR; INTRAVENOUS at 12:20

## 2020-01-01 RX ADMIN — ACETAMINOPHEN 650 MG: 325 TABLET, FILM COATED ORAL at 16:59

## 2020-01-01 RX ADMIN — MELATONIN 6 MG: at 22:16

## 2020-01-01 RX ADMIN — PANTOPRAZOLE SODIUM 40 MG: 40 TABLET, DELAYED RELEASE ORAL at 05:54

## 2020-01-01 RX ADMIN — APIXABAN 2.5 MG: 2.5 TABLET, FILM COATED ORAL at 21:23

## 2020-01-01 RX ADMIN — FERROUS SULFATE TAB 325 MG (65 MG ELEMENTAL FE) 325 MG: 325 (65 FE) TAB at 09:17

## 2020-01-01 RX ADMIN — CEFTRIAXONE 2000 MG: 2 INJECTION, POWDER, FOR SOLUTION INTRAMUSCULAR; INTRAVENOUS at 13:24

## 2020-01-01 RX ADMIN — SODIUM CHLORIDE 20 ML/HR: 0.9 INJECTION, SOLUTION INTRAVENOUS at 10:50

## 2020-01-01 RX ADMIN — ATORVASTATIN CALCIUM 40 MG: 40 TABLET, FILM COATED ORAL at 08:14

## 2020-01-01 RX ADMIN — CEFEPIME HYDROCHLORIDE 2000 MG: 2 INJECTION, POWDER, FOR SOLUTION INTRAVENOUS at 23:28

## 2020-01-01 RX ADMIN — PROPOFOL 20 MG: 10 INJECTION, EMULSION INTRAVENOUS at 09:48

## 2020-01-01 RX ADMIN — ONDANSETRON 4 MG: 2 INJECTION INTRAMUSCULAR; INTRAVENOUS at 09:35

## 2020-01-01 RX ADMIN — Medication 250 MG: at 09:56

## 2020-01-01 RX ADMIN — ALTEPLASE 2 MG: 2.2 INJECTION, POWDER, LYOPHILIZED, FOR SOLUTION INTRAVENOUS at 13:12

## 2020-01-01 RX ADMIN — APIXABAN 2.5 MG: 2.5 TABLET, FILM COATED ORAL at 22:17

## 2020-01-01 RX ADMIN — PROPOFOL 50 MG: 10 INJECTION, EMULSION INTRAVENOUS at 08:12

## 2020-01-01 RX ADMIN — APIXABAN 5 MG: 5 TABLET, FILM COATED ORAL at 08:26

## 2020-01-01 RX ADMIN — DIPHENHYDRAMINE HYDROCHLORIDE 25 MG: 50 INJECTION, SOLUTION INTRAMUSCULAR; INTRAVENOUS at 21:10

## 2020-01-01 RX ADMIN — PROPOFOL 50 MG: 10 INJECTION, EMULSION INTRAVENOUS at 14:27

## 2020-01-01 RX ADMIN — ACETAMINOPHEN 975 MG: 325 TABLET, FILM COATED ORAL at 13:23

## 2020-01-01 RX ADMIN — PANTOPRAZOLE SODIUM 40 MG: 40 TABLET, DELAYED RELEASE ORAL at 05:46

## 2020-01-01 RX ADMIN — GABAPENTIN 300 MG: 300 CAPSULE ORAL at 22:32

## 2020-01-01 RX ADMIN — AMLODIPINE BESYLATE 5 MG: 5 TABLET ORAL at 12:51

## 2020-01-01 RX ADMIN — APIXABAN 2.5 MG: 2.5 TABLET, FILM COATED ORAL at 21:08

## 2020-01-01 RX ADMIN — MAGNESIUM SULFATE IN WATER 2 G: 40 INJECTION, SOLUTION INTRAVENOUS at 09:07

## 2020-01-01 RX ADMIN — Medication 250 MG: at 18:02

## 2020-01-01 RX ADMIN — GABAPENTIN 300 MG: 300 CAPSULE ORAL at 21:21

## 2020-01-01 RX ADMIN — GABAPENTIN 300 MG: 300 CAPSULE ORAL at 21:08

## 2020-01-01 RX ADMIN — CHLORHEXIDINE GLUCONATE 15 ML: 1.2 RINSE ORAL at 05:54

## 2020-01-01 RX ADMIN — MELATONIN 6 MG: at 21:21

## 2020-01-01 RX ADMIN — Medication 250 MG: at 17:31

## 2020-01-01 RX ADMIN — FERROUS SULFATE TAB 325 MG (65 MG ELEMENTAL FE) 325 MG: 325 (65 FE) TAB at 09:42

## 2020-01-01 RX ADMIN — MELATONIN 6 MG: at 22:31

## 2020-01-01 RX ADMIN — PANTOPRAZOLE SODIUM 40 MG: 40 TABLET, DELAYED RELEASE ORAL at 05:00

## 2020-01-01 RX ADMIN — SODIUM CHLORIDE, SODIUM LACTATE, POTASSIUM CHLORIDE, AND CALCIUM CHLORIDE: .6; .31; .03; .02 INJECTION, SOLUTION INTRAVENOUS at 07:29

## 2020-01-01 RX ADMIN — POTASSIUM CHLORIDE 40 MEQ: 1500 TABLET, EXTENDED RELEASE ORAL at 10:15

## 2020-01-01 RX ADMIN — FOLIC ACID 1 MG: 1 TABLET ORAL at 13:23

## 2020-01-01 RX ADMIN — CEFTRIAXONE 2000 MG: 2 INJECTION, POWDER, FOR SOLUTION INTRAMUSCULAR; INTRAVENOUS at 13:39

## 2020-01-01 RX ADMIN — DIPHENHYDRAMINE HYDROCHLORIDE 25 MG: 50 INJECTION, SOLUTION INTRAMUSCULAR; INTRAVENOUS at 12:09

## 2020-01-01 RX ADMIN — NORTRIPTYLINE HYDROCHLORIDE 10 MG: 10 CAPSULE ORAL at 21:47

## 2020-01-01 RX ADMIN — SODIUM CHLORIDE 20 ML/HR: 0.9 INJECTION, SOLUTION INTRAVENOUS at 10:44

## 2020-01-01 RX ADMIN — APIXABAN 5 MG: 5 TABLET, FILM COATED ORAL at 17:31

## 2020-01-01 RX ADMIN — GLYCOPYRROLATE 0.4 MG: 0.2 INJECTION, SOLUTION INTRAMUSCULAR; INTRAVENOUS at 09:55

## 2020-01-01 RX ADMIN — NORTRIPTYLINE HYDROCHLORIDE 10 MG: 10 CAPSULE ORAL at 21:44

## 2020-01-01 RX ADMIN — GABAPENTIN 300 MG: 300 CAPSULE ORAL at 21:12

## 2020-01-01 RX ADMIN — APIXABAN 2.5 MG: 2.5 TABLET, FILM COATED ORAL at 21:20

## 2020-01-01 RX ADMIN — ATORVASTATIN CALCIUM 40 MG: 40 TABLET, FILM COATED ORAL at 09:18

## 2020-01-01 RX ADMIN — ALTEPLASE 2 MG: 2.2 INJECTION, POWDER, LYOPHILIZED, FOR SOLUTION INTRAVENOUS at 08:34

## 2020-01-01 RX ADMIN — FENTANYL CITRATE 50 MCG: 50 INJECTION, SOLUTION INTRAMUSCULAR; INTRAVENOUS at 08:07

## 2020-01-01 RX ADMIN — Medication 250 MG: at 08:48

## 2020-01-01 RX ADMIN — GABAPENTIN 300 MG: 300 CAPSULE ORAL at 21:47

## 2020-01-01 RX ADMIN — FLUTICASONE PROPIONATE 1 SPRAY: 50 SPRAY, METERED NASAL at 08:12

## 2020-01-01 RX ADMIN — FOLIC ACID 1 MG: 1 TABLET ORAL at 09:28

## 2020-01-01 RX ADMIN — VANCOMYCIN HYDROCHLORIDE 1000 MG: 1 INJECTION, SOLUTION INTRAVENOUS at 11:37

## 2020-01-01 RX ADMIN — LIDOCAINE HYDROCHLORIDE 100 MG: 10 INJECTION, SOLUTION EPIDURAL; INFILTRATION; INTRACAUDAL; PERINEURAL at 09:43

## 2020-01-01 RX ADMIN — FLUTICASONE PROPIONATE 1 SPRAY: 50 SPRAY, METERED NASAL at 09:46

## 2020-01-01 RX ADMIN — SODIUM CHLORIDE, SODIUM GLUCONATE, SODIUM ACETATE, POTASSIUM CHLORIDE, MAGNESIUM CHLORIDE, SODIUM PHOSPHATE, DIBASIC, AND POTASSIUM PHOSPHATE 50 ML/HR: .53; .5; .37; .037; .03; .012; .00082 INJECTION, SOLUTION INTRAVENOUS at 14:21

## 2020-01-01 RX ADMIN — FLUTICASONE PROPIONATE 1 SPRAY: 50 SPRAY, METERED NASAL at 09:19

## 2020-01-01 RX ADMIN — ATORVASTATIN CALCIUM 40 MG: 40 TABLET, FILM COATED ORAL at 17:44

## 2020-01-01 RX ADMIN — FOLIC ACID TAB 400 MCG 400 MCG: 400 TAB at 08:22

## 2020-01-01 RX ADMIN — IRON SUCROSE 300 MG: 20 INJECTION, SOLUTION INTRAVENOUS at 13:30

## 2020-01-01 RX ADMIN — LABETALOL 20 MG/4 ML (5 MG/ML) INTRAVENOUS SYRINGE 10 MG: at 21:50

## 2020-01-01 RX ADMIN — Medication 250 MG: at 17:11

## 2020-01-01 RX ADMIN — CEFTRIAXONE SODIUM 2000 MG: 10 INJECTION, POWDER, FOR SOLUTION INTRAVENOUS at 10:49

## 2020-01-01 RX ADMIN — FERROUS SULFATE TAB 325 MG (65 MG ELEMENTAL FE) 325 MG: 325 (65 FE) TAB at 08:43

## 2020-01-01 RX ADMIN — ATORVASTATIN CALCIUM 40 MG: 40 TABLET, FILM COATED ORAL at 16:59

## 2020-01-01 RX ADMIN — DOCUSATE SODIUM 100 MG: 100 CAPSULE ORAL at 09:18

## 2020-01-01 RX ADMIN — NORTRIPTYLINE HYDROCHLORIDE 10 MG: 10 CAPSULE ORAL at 22:17

## 2020-01-01 RX ADMIN — SODIUM CHLORIDE 20 ML/HR: 0.9 INJECTION, SOLUTION INTRAVENOUS at 13:30

## 2020-01-01 RX ADMIN — DOCUSATE SODIUM 100 MG: 100 CAPSULE, LIQUID FILLED ORAL at 08:37

## 2020-01-01 RX ADMIN — LIDOCAINE HYDROCHLORIDE 50 MG: 10 INJECTION, SOLUTION EPIDURAL; INFILTRATION; INTRACAUDAL; PERINEURAL at 08:09

## 2020-01-01 RX ADMIN — CEFTRIAXONE 2000 MG: 2 INJECTION, POWDER, FOR SOLUTION INTRAMUSCULAR; INTRAVENOUS at 13:20

## 2020-01-01 RX ADMIN — SODIUM CHLORIDE 20 ML/HR: 0.9 INJECTION, SOLUTION INTRAVENOUS at 12:50

## 2020-01-01 RX ADMIN — Medication 1000 MG: at 14:13

## 2020-01-01 RX ADMIN — CEFEPIME HYDROCHLORIDE 1000 MG: 1 INJECTION, POWDER, FOR SOLUTION INTRAMUSCULAR; INTRAVENOUS at 11:24

## 2020-01-01 RX ADMIN — Medication 250 MG: at 17:23

## 2020-01-01 RX ADMIN — APIXABAN 2.5 MG: 2.5 TABLET, FILM COATED ORAL at 13:30

## 2020-01-01 RX ADMIN — FLUTICASONE PROPIONATE 1 SPRAY: 50 SPRAY, METERED NASAL at 08:38

## 2020-01-01 RX ADMIN — FENTANYL CITRATE 25 MCG: 50 INJECTION, SOLUTION INTRAMUSCULAR; INTRAVENOUS at 08:32

## 2020-01-01 RX ADMIN — MAGNESIUM SULFATE IN WATER 2 G: 40 INJECTION, SOLUTION INTRAVENOUS at 08:44

## 2020-01-01 RX ADMIN — PANTOPRAZOLE SODIUM 40 MG: 40 TABLET, DELAYED RELEASE ORAL at 05:56

## 2020-01-01 RX ADMIN — FLUTICASONE PROPIONATE 1 SPRAY: 50 SPRAY, METERED NASAL at 08:27

## 2020-01-01 RX ADMIN — FERROUS SULFATE TAB 325 MG (65 MG ELEMENTAL FE) 325 MG: 325 (65 FE) TAB at 08:46

## 2020-01-01 RX ADMIN — FOLIC ACID 1 MG: 1 TABLET ORAL at 08:38

## 2020-01-01 RX ADMIN — APIXABAN 2.5 MG: 2.5 TABLET, FILM COATED ORAL at 09:03

## 2020-01-01 RX ADMIN — DOCUSATE SODIUM 100 MG: 100 CAPSULE, LIQUID FILLED ORAL at 08:33

## 2020-01-01 RX ADMIN — ACETAMINOPHEN 975 MG: 325 TABLET, FILM COATED ORAL at 05:35

## 2020-01-01 RX ADMIN — ACETAMINOPHEN 975 MG: 325 TABLET, FILM COATED ORAL at 21:20

## 2020-01-01 RX ADMIN — ACETAMINOPHEN 650 MG: 325 TABLET, FILM COATED ORAL at 04:23

## 2020-01-01 RX ADMIN — SODIUM CHLORIDE 20 ML/HR: 0.9 INJECTION, SOLUTION INTRAVENOUS at 13:04

## 2020-01-01 RX ADMIN — GABAPENTIN 300 MG: 300 CAPSULE ORAL at 15:40

## 2020-01-01 RX ADMIN — AMLODIPINE BESYLATE 5 MG: 5 TABLET ORAL at 08:05

## 2020-01-01 RX ADMIN — Medication 100 MG: at 14:23

## 2020-01-01 RX ADMIN — PHENYLEPHRINE HYDROCHLORIDE 50 MCG/MIN: 10 INJECTION INTRAVENOUS at 11:33

## 2020-01-01 RX ADMIN — AMLODIPINE BESYLATE 5 MG: 5 TABLET ORAL at 08:43

## 2020-01-01 RX ADMIN — AMLODIPINE BESYLATE 5 MG: 5 TABLET ORAL at 11:39

## 2020-01-01 RX ADMIN — SODIUM CHLORIDE 50 ML/HR: 0.9 INJECTION, SOLUTION INTRAVENOUS at 14:07

## 2020-01-01 RX ADMIN — VANCOMYCIN HYDROCHLORIDE 1750 MG: 1 INJECTION, POWDER, LYOPHILIZED, FOR SOLUTION INTRAVENOUS at 00:21

## 2020-01-01 RX ADMIN — ESMOLOL HYDROCHLORIDE 20 MG: 100 INJECTION, SOLUTION INTRAVENOUS at 14:59

## 2020-01-01 RX ADMIN — CEFTRIAXONE 2000 MG: 2 INJECTION, POWDER, FOR SOLUTION INTRAMUSCULAR; INTRAVENOUS at 14:26

## 2020-01-01 RX ADMIN — LIDOCAINE HYDROCHLORIDE 20 ML: 10 INJECTION, SOLUTION EPIDURAL; INFILTRATION; INTRACAUDAL; PERINEURAL at 09:06

## 2020-01-01 RX ADMIN — GENTAMICIN SULFATE 80 MG: 40 INJECTION, SOLUTION INTRAMUSCULAR; INTRAVENOUS at 08:58

## 2020-01-01 RX ADMIN — CEFTRIAXONE 2000 MG: 2 INJECTION, POWDER, FOR SOLUTION INTRAMUSCULAR; INTRAVENOUS at 13:19

## 2020-01-01 RX ADMIN — AMLODIPINE BESYLATE 5 MG: 5 TABLET ORAL at 09:18

## 2020-01-01 RX ADMIN — SODIUM CHLORIDE: 0.9 INJECTION, SOLUTION INTRAVENOUS at 11:10

## 2020-01-01 RX ADMIN — GABAPENTIN 300 MG: 300 CAPSULE ORAL at 20:46

## 2020-01-01 RX ADMIN — CEFTRIAXONE 2000 MG: 2 INJECTION, POWDER, FOR SOLUTION INTRAMUSCULAR; INTRAVENOUS at 12:51

## 2020-01-01 RX ADMIN — NORTRIPTYLINE HYDROCHLORIDE 10 MG: 10 CAPSULE ORAL at 21:42

## 2020-01-01 RX ADMIN — PANTOPRAZOLE SODIUM 40 MG: 40 TABLET, DELAYED RELEASE ORAL at 04:40

## 2020-01-01 RX ADMIN — PANTOPRAZOLE SODIUM 40 MG: 40 TABLET, DELAYED RELEASE ORAL at 05:01

## 2020-01-01 RX ADMIN — METOCLOPRAMIDE HYDROCHLORIDE 10 MG: 5 INJECTION INTRAMUSCULAR; INTRAVENOUS at 21:21

## 2020-01-01 RX ADMIN — SODIUM CHLORIDE 20 ML/HR: 0.9 INJECTION, SOLUTION INTRAVENOUS at 13:19

## 2020-01-01 RX ADMIN — AMLODIPINE BESYLATE 5 MG: 5 TABLET ORAL at 09:28

## 2020-01-01 RX ADMIN — ROCURONIUM BROMIDE 10 MG: 50 INJECTION, SOLUTION INTRAVENOUS at 08:45

## 2020-01-01 RX ADMIN — DOCUSATE SODIUM 100 MG: 100 CAPSULE, LIQUID FILLED ORAL at 17:00

## 2020-01-01 RX ADMIN — ATORVASTATIN CALCIUM 40 MG: 40 TABLET, FILM COATED ORAL at 10:00

## 2020-01-01 RX ADMIN — GABAPENTIN 300 MG: 300 CAPSULE ORAL at 21:14

## 2020-01-01 RX ADMIN — SODIUM CHLORIDE, SODIUM LACTATE, POTASSIUM CHLORIDE, AND CALCIUM CHLORIDE 75 ML/HR: .6; .31; .03; .02 INJECTION, SOLUTION INTRAVENOUS at 01:56

## 2020-01-01 RX ADMIN — FERROUS SULFATE TAB 325 MG (65 MG ELEMENTAL FE) 325 MG: 325 (65 FE) TAB at 08:26

## 2020-01-01 RX ADMIN — NORTRIPTYLINE HYDROCHLORIDE 10 MG: 10 CAPSULE ORAL at 21:21

## 2020-01-01 RX ADMIN — METOCLOPRAMIDE HYDROCHLORIDE 10 MG: 5 INJECTION INTRAMUSCULAR; INTRAVENOUS at 21:12

## 2020-01-01 RX ADMIN — GABAPENTIN 300 MG: 300 CAPSULE ORAL at 08:14

## 2020-01-01 RX ADMIN — Medication 250 MG: at 08:38

## 2020-01-01 RX ADMIN — ACETAMINOPHEN 650 MG: 325 TABLET, FILM COATED ORAL at 11:40

## 2020-01-01 RX ADMIN — LISINOPRIL 5 MG: 5 TABLET ORAL at 08:22

## 2020-01-01 RX ADMIN — SODIUM CHLORIDE, SODIUM LACTATE, POTASSIUM CHLORIDE, AND CALCIUM CHLORIDE 75 ML/HR: .6; .31; .03; .02 INJECTION, SOLUTION INTRAVENOUS at 12:58

## 2020-01-01 RX ADMIN — APIXABAN 2.5 MG: 2.5 TABLET, FILM COATED ORAL at 09:43

## 2020-01-01 RX ADMIN — KETOROLAC TROMETHAMINE 30 MG: 30 INJECTION, SOLUTION INTRAMUSCULAR at 21:06

## 2020-01-01 RX ADMIN — GABAPENTIN 300 MG: 300 CAPSULE ORAL at 22:17

## 2020-01-01 RX ADMIN — CEFTRIAXONE 2000 MG: 2 INJECTION, POWDER, FOR SOLUTION INTRAMUSCULAR; INTRAVENOUS at 13:06

## 2020-01-01 RX ADMIN — FOLIC ACID 1 MG: 1 TABLET ORAL at 08:33

## 2020-01-01 RX ADMIN — ALTEPLASE 2 MG: 2.2 INJECTION, POWDER, LYOPHILIZED, FOR SOLUTION INTRAVENOUS at 14:09

## 2020-01-01 RX ADMIN — ATORVASTATIN CALCIUM 40 MG: 40 TABLET, FILM COATED ORAL at 09:56

## 2020-01-01 RX ADMIN — FENTANYL CITRATE 25 MCG: 50 INJECTION, SOLUTION INTRAMUSCULAR; INTRAVENOUS at 08:30

## 2020-01-01 RX ADMIN — SODIUM CHLORIDE 1000 ML: 0.9 INJECTION, SOLUTION INTRAVENOUS at 21:00

## 2020-01-01 RX ADMIN — METOCLOPRAMIDE HYDROCHLORIDE 10 MG: 5 INJECTION INTRAMUSCULAR; INTRAVENOUS at 15:00

## 2020-01-01 RX ADMIN — PROPOFOL 30 MG: 10 INJECTION, EMULSION INTRAVENOUS at 09:45

## 2020-01-01 RX ADMIN — CEFTRIAXONE 2000 MG: 2 INJECTION, POWDER, FOR SOLUTION INTRAMUSCULAR; INTRAVENOUS at 12:24

## 2020-01-01 RX ADMIN — FENTANYL CITRATE 25 MCG: 50 INJECTION, SOLUTION INTRAMUSCULAR; INTRAVENOUS at 10:52

## 2020-01-01 RX ADMIN — NEOSTIGMINE METHYLSULFATE 4 MG: 1 INJECTION INTRAVENOUS at 09:56

## 2020-01-01 RX ADMIN — AMLODIPINE BESYLATE 5 MG: 5 TABLET ORAL at 08:32

## 2020-01-01 RX ADMIN — Medication 250 MG: at 17:13

## 2020-01-01 RX ADMIN — Medication 250 MG: at 17:24

## 2020-01-01 RX ADMIN — NORTRIPTYLINE HYDROCHLORIDE 10 MG: 10 CAPSULE ORAL at 22:32

## 2020-01-01 RX ADMIN — AMLODIPINE BESYLATE 5 MG: 5 TABLET ORAL at 08:26

## 2020-01-01 RX ADMIN — METRONIDAZOLE 500 MG: 500 INJECTION, SOLUTION INTRAVENOUS at 09:57

## 2020-01-01 RX ADMIN — DOCUSATE SODIUM 100 MG: 100 CAPSULE ORAL at 09:43

## 2020-01-01 RX ADMIN — METRONIDAZOLE 500 MG: 500 INJECTION, SOLUTION INTRAVENOUS at 17:45

## 2020-01-01 RX ADMIN — FENTANYL CITRATE 50 MCG: 50 INJECTION, SOLUTION INTRAMUSCULAR; INTRAVENOUS at 11:29

## 2020-01-01 RX ADMIN — LABETALOL 20 MG/4 ML (5 MG/ML) INTRAVENOUS SYRINGE 10 MG: at 21:15

## 2020-01-01 RX ADMIN — IRON SUCROSE 300 MG: 20 INJECTION, SOLUTION INTRAVENOUS at 18:17

## 2020-01-01 RX ADMIN — TOPICAL ANESTHETIC 1 SPRAY: 200 SPRAY DENTAL; PERIODONTAL at 09:42

## 2020-01-01 RX ADMIN — GABAPENTIN 300 MG: 300 CAPSULE ORAL at 21:23

## 2020-01-01 RX ADMIN — ACETAMINOPHEN 975 MG: 325 TABLET, FILM COATED ORAL at 13:58

## 2020-01-01 RX ADMIN — SODIUM CHLORIDE 20 ML/HR: 0.9 INJECTION, SOLUTION INTRAVENOUS at 08:20

## 2020-01-01 RX ADMIN — ROCURONIUM BROMIDE 10 MG: 50 INJECTION, SOLUTION INTRAVENOUS at 08:13

## 2020-01-01 RX ADMIN — CEFTRIAXONE 2000 MG: 2 INJECTION, POWDER, FOR SOLUTION INTRAMUSCULAR; INTRAVENOUS at 13:35

## 2020-01-01 RX ADMIN — ATORVASTATIN CALCIUM 40 MG: 40 TABLET, FILM COATED ORAL at 08:22

## 2020-01-01 RX ADMIN — FLUTICASONE PROPIONATE 1 SPRAY: 50 SPRAY, METERED NASAL at 09:39

## 2020-01-01 RX ADMIN — ATORVASTATIN CALCIUM 40 MG: 40 TABLET, FILM COATED ORAL at 08:44

## 2020-01-01 RX ADMIN — AMLODIPINE BESYLATE 5 MG: 5 TABLET ORAL at 09:56

## 2020-01-01 RX ADMIN — SODIUM CHLORIDE 20 ML/HR: 0.9 INJECTION, SOLUTION INTRAVENOUS at 12:25

## 2020-01-01 RX ADMIN — Medication 250 MG: at 17:45

## 2020-01-01 RX ADMIN — GENTAMICIN SULFATE 80 MG: 40 INJECTION, SOLUTION INTRAMUSCULAR; INTRAVENOUS at 12:32

## 2020-01-01 RX ADMIN — KETAMINE HYDROCHLORIDE 20 MG: 50 INJECTION INTRAMUSCULAR; INTRAVENOUS at 09:47

## 2020-01-01 RX ADMIN — AMLODIPINE BESYLATE 5 MG: 5 TABLET ORAL at 08:11

## 2020-01-01 RX ADMIN — MAGNESIUM SULFATE IN WATER 2 G: 40 INJECTION, SOLUTION INTRAVENOUS at 10:03

## 2020-01-01 RX ADMIN — APIXABAN 5 MG: 5 TABLET, FILM COATED ORAL at 10:12

## 2020-01-01 RX ADMIN — Medication 250 MG: at 18:14

## 2020-01-01 RX ADMIN — CEFTRIAXONE 2000 MG: 2 INJECTION, POWDER, FOR SOLUTION INTRAMUSCULAR; INTRAVENOUS at 14:47

## 2020-01-01 RX ADMIN — FLUTICASONE PROPIONATE 1 SPRAY: 50 SPRAY, METERED NASAL at 09:29

## 2020-01-01 RX ADMIN — APIXABAN 5 MG: 5 TABLET, FILM COATED ORAL at 20:47

## 2020-01-01 RX ADMIN — GABAPENTIN 300 MG: 300 CAPSULE ORAL at 16:47

## 2020-01-01 RX ADMIN — SODIUM CHLORIDE 20 ML/HR: 0.9 INJECTION, SOLUTION INTRAVENOUS at 13:15

## 2020-01-01 RX ADMIN — APIXABAN 5 MG: 5 TABLET, FILM COATED ORAL at 11:40

## 2020-01-01 RX ADMIN — VALPROATE SODIUM 1000 MG: 100 INJECTION, SOLUTION INTRAVENOUS at 11:55

## 2020-01-01 RX ADMIN — DOCUSATE SODIUM 100 MG: 100 CAPSULE, LIQUID FILLED ORAL at 08:26

## 2020-01-01 RX ADMIN — LIDOCAINE HYDROCHLORIDE 20 ML: 10 INJECTION, SOLUTION EPIDURAL; INFILTRATION; INTRACAUDAL; PERINEURAL at 12:15

## 2020-01-01 RX ADMIN — CEFTRIAXONE 2000 MG: 2 INJECTION, POWDER, FOR SOLUTION INTRAMUSCULAR; INTRAVENOUS at 15:09

## 2020-01-01 RX ADMIN — SODIUM CHLORIDE 20 ML/HR: 0.9 INJECTION, SOLUTION INTRAVENOUS at 13:20

## 2020-01-01 RX ADMIN — CEFTRIAXONE 2000 MG: 2 INJECTION, POWDER, FOR SOLUTION INTRAMUSCULAR; INTRAVENOUS at 15:31

## 2020-01-01 RX ADMIN — APIXABAN 5 MG: 5 TABLET, FILM COATED ORAL at 17:44

## 2020-01-01 RX ADMIN — NORTRIPTYLINE HYDROCHLORIDE 10 MG: 10 CAPSULE ORAL at 21:12

## 2020-01-01 RX ADMIN — Medication 250 MG: at 10:14

## 2020-01-01 RX ADMIN — GABAPENTIN 300 MG: 300 CAPSULE ORAL at 21:42

## 2020-01-01 RX ADMIN — VANCOMYCIN HYDROCHLORIDE 1000 MG: 1 INJECTION, SOLUTION INTRAVENOUS at 07:56

## 2020-01-01 RX ADMIN — APIXABAN 2.5 MG: 2.5 TABLET, FILM COATED ORAL at 09:56

## 2020-01-01 RX ADMIN — ESMOLOL HYDROCHLORIDE 10 MG: 100 INJECTION, SOLUTION INTRAVENOUS at 14:27

## 2020-01-01 RX ADMIN — GABAPENTIN 300 MG: 300 CAPSULE ORAL at 21:44

## 2020-01-01 RX ADMIN — PANTOPRAZOLE SODIUM 40 MG: 40 TABLET, DELAYED RELEASE ORAL at 08:26

## 2020-01-01 RX ADMIN — FOLIC ACID 1 MG: 1 TABLET ORAL at 11:39

## 2020-01-01 RX ADMIN — Medication 250 MG: at 09:28

## 2020-01-01 RX ADMIN — FENTANYL CITRATE 25 MCG: 50 INJECTION, SOLUTION INTRAMUSCULAR; INTRAVENOUS at 08:35

## 2020-01-01 RX ADMIN — GABAPENTIN 300 MG: 300 CAPSULE ORAL at 08:44

## 2020-01-01 RX ADMIN — CEFTRIAXONE SODIUM 2000 MG: 10 INJECTION, POWDER, FOR SOLUTION INTRAVENOUS at 13:32

## 2020-01-01 RX ADMIN — KETAMINE HYDROCHLORIDE 10 MG: 50 INJECTION, SOLUTION INTRAMUSCULAR; INTRAVENOUS at 08:35

## 2020-01-01 RX ADMIN — APIXABAN 5 MG: 5 TABLET, FILM COATED ORAL at 17:00

## 2020-01-01 RX ADMIN — CEFTRIAXONE 2000 MG: 2 INJECTION, POWDER, FOR SOLUTION INTRAMUSCULAR; INTRAVENOUS at 14:18

## 2020-01-01 RX ADMIN — PROPOFOL 60 MCG/KG/MIN: 10 INJECTION, EMULSION INTRAVENOUS at 08:32

## 2020-01-01 RX ADMIN — APIXABAN 5 MG: 5 TABLET, FILM COATED ORAL at 09:37

## 2020-01-01 RX ADMIN — DOCUSATE SODIUM 100 MG: 100 CAPSULE ORAL at 17:11

## 2020-01-01 RX ADMIN — VALPROATE SODIUM 1000 MG: 100 INJECTION, SOLUTION INTRAVENOUS at 12:58

## 2020-01-01 RX ADMIN — ROCURONIUM BROMIDE 20 MG: 50 INJECTION, SOLUTION INTRAVENOUS at 08:59

## 2020-01-01 RX ADMIN — ACETAMINOPHEN 975 MG: 325 TABLET ORAL at 06:46

## 2020-01-01 RX ADMIN — AMLODIPINE BESYLATE 5 MG: 5 TABLET ORAL at 08:37

## 2020-01-01 RX ADMIN — CEFTRIAXONE 2000 MG: 2 INJECTION, POWDER, FOR SOLUTION INTRAMUSCULAR; INTRAVENOUS at 10:50

## 2020-01-01 RX ADMIN — Medication 250 MG: at 11:49

## 2020-01-01 RX ADMIN — ATORVASTATIN CALCIUM 40 MG: 40 TABLET, FILM COATED ORAL at 17:23

## 2020-01-01 RX ADMIN — CEFTRIAXONE 2000 MG: 2 INJECTION, POWDER, FOR SOLUTION INTRAMUSCULAR; INTRAVENOUS at 12:50

## 2020-01-01 RX ADMIN — GABAPENTIN 300 MG: 300 CAPSULE ORAL at 21:33

## 2020-01-01 RX ADMIN — NORTRIPTYLINE HYDROCHLORIDE 10 MG: 10 CAPSULE ORAL at 22:37

## 2020-01-01 RX ADMIN — FERROUS SULFATE TAB 325 MG (65 MG ELEMENTAL FE) 325 MG: 325 (65 FE) TAB at 09:18

## 2020-01-01 RX ADMIN — MELATONIN 6 MG: at 21:12

## 2020-01-01 RX ADMIN — AMLODIPINE BESYLATE 5 MG: 5 TABLET ORAL at 08:13

## 2020-01-01 RX ADMIN — SODIUM CHLORIDE 500 ML: 0.9 INJECTION, SOLUTION INTRAVENOUS at 12:08

## 2020-01-01 RX ADMIN — KETAMINE HYDROCHLORIDE 10 MG: 50 INJECTION, SOLUTION INTRAMUSCULAR; INTRAVENOUS at 08:32

## 2020-01-01 RX ADMIN — ATORVASTATIN CALCIUM 40 MG: 40 TABLET, FILM COATED ORAL at 17:30

## 2020-01-01 RX ADMIN — SODIUM CHLORIDE 20 ML/HR: 0.9 INJECTION, SOLUTION INTRAVENOUS at 15:36

## 2020-01-01 RX ADMIN — OXYCODONE HYDROCHLORIDE 10 MG: 10 TABLET ORAL at 14:14

## 2020-01-01 RX ADMIN — FERROUS SULFATE TAB 325 MG (65 MG ELEMENTAL FE) 325 MG: 325 (65 FE) TAB at 13:29

## 2020-01-01 RX ADMIN — AMLODIPINE BESYLATE 5 MG: 5 TABLET ORAL at 09:42

## 2020-01-01 RX ADMIN — SODIUM CHLORIDE, SODIUM LACTATE, POTASSIUM CHLORIDE, AND CALCIUM CHLORIDE: .6; .31; .03; .02 INJECTION, SOLUTION INTRAVENOUS at 09:36

## 2020-01-01 RX ADMIN — Medication 250 MG: at 09:18

## 2020-01-01 RX ADMIN — PHENYLEPHRINE HYDROCHLORIDE 20 MCG/MIN: 10 INJECTION INTRAVENOUS at 08:50

## 2020-01-01 RX ADMIN — CEFTRIAXONE 2000 MG: 2 INJECTION, POWDER, FOR SOLUTION INTRAMUSCULAR; INTRAVENOUS at 13:56

## 2020-01-01 RX ADMIN — KETAMINE HYDROCHLORIDE 20 MG: 50 INJECTION, SOLUTION INTRAMUSCULAR; INTRAVENOUS at 11:33

## 2020-01-01 RX ADMIN — FENTANYL CITRATE 50 MCG: 50 INJECTION, SOLUTION INTRAMUSCULAR; INTRAVENOUS at 14:27

## 2020-01-01 RX ADMIN — VANCOMYCIN HYDROCHLORIDE 1750 MG: 1 INJECTION, POWDER, LYOPHILIZED, FOR SOLUTION INTRAVENOUS at 23:56

## 2020-01-01 RX ADMIN — FENTANYL CITRATE 25 MCG: 50 INJECTION, SOLUTION INTRAMUSCULAR; INTRAVENOUS at 11:35

## 2020-01-01 RX ADMIN — DIPHENHYDRAMINE HYDROCHLORIDE 25 MG: 50 INJECTION, SOLUTION INTRAMUSCULAR; INTRAVENOUS at 04:27

## 2020-01-01 RX ADMIN — CEFEPIME HYDROCHLORIDE 1000 MG: 1 INJECTION, POWDER, FOR SOLUTION INTRAMUSCULAR; INTRAVENOUS at 21:23

## 2020-01-01 RX ADMIN — PANTOPRAZOLE SODIUM 40 MG: 40 TABLET, DELAYED RELEASE ORAL at 05:11

## 2020-01-01 RX ADMIN — PANTOPRAZOLE SODIUM 40 MG: 40 TABLET, DELAYED RELEASE ORAL at 06:40

## 2020-01-01 RX ADMIN — DIPHENHYDRAMINE HYDROCHLORIDE 25 MG: 50 INJECTION, SOLUTION INTRAMUSCULAR; INTRAVENOUS at 21:18

## 2020-01-01 RX ADMIN — ACETAMINOPHEN 975 MG: 325 TABLET, FILM COATED ORAL at 15:00

## 2020-01-01 RX ADMIN — FLUTICASONE PROPIONATE 1 SPRAY: 50 SPRAY, METERED NASAL at 15:07

## 2020-01-01 RX ADMIN — ATORVASTATIN CALCIUM 40 MG: 40 TABLET, FILM COATED ORAL at 18:14

## 2020-01-01 RX ADMIN — Medication 250 MG: at 10:03

## 2020-01-01 RX ADMIN — FENTANYL CITRATE 50 MCG: 50 INJECTION, SOLUTION INTRAMUSCULAR; INTRAVENOUS at 14:23

## 2020-01-01 RX ADMIN — METRONIDAZOLE 500 MG: 500 INJECTION, SOLUTION INTRAVENOUS at 03:28

## 2020-01-01 RX ADMIN — DOCUSATE SODIUM 100 MG: 100 CAPSULE, LIQUID FILLED ORAL at 17:23

## 2020-01-01 RX ADMIN — PHENYLEPHRINE HYDROCHLORIDE 20 MCG/MIN: 10 INJECTION INTRAVENOUS at 08:21

## 2020-01-01 RX ADMIN — CEFAZOLIN SODIUM 2000 MG: 2 SOLUTION INTRAVENOUS at 08:18

## 2020-01-01 RX ADMIN — ROCURONIUM BROMIDE 10 MG: 50 INJECTION, SOLUTION INTRAVENOUS at 09:14

## 2020-01-01 RX ADMIN — MELATONIN 6 MG: at 21:47

## 2020-01-01 RX ADMIN — CEFTRIAXONE 2000 MG: 2 INJECTION, POWDER, FOR SOLUTION INTRAMUSCULAR; INTRAVENOUS at 15:06

## 2020-01-01 RX ADMIN — PROPOFOL 150 MG: 10 INJECTION, EMULSION INTRAVENOUS at 08:10

## 2020-01-01 RX ADMIN — VANCOMYCIN HYDROCHLORIDE 1750 MG: 1 INJECTION, POWDER, LYOPHILIZED, FOR SOLUTION INTRAVENOUS at 00:30

## 2020-01-01 RX ADMIN — FERROUS SULFATE TAB 325 MG (65 MG ELEMENTAL FE) 325 MG: 325 (65 FE) TAB at 09:28

## 2020-01-01 RX ADMIN — ACETAMINOPHEN 975 MG: 325 TABLET, FILM COATED ORAL at 04:39

## 2020-01-01 RX ADMIN — METRONIDAZOLE 500 MG: 500 INJECTION, SOLUTION INTRAVENOUS at 09:44

## 2020-01-01 RX ADMIN — APIXABAN 2.5 MG: 2.5 TABLET, FILM COATED ORAL at 09:18

## 2020-01-01 RX ADMIN — APIXABAN 5 MG: 5 TABLET, FILM COATED ORAL at 06:18

## 2020-01-01 RX ADMIN — NORTRIPTYLINE HYDROCHLORIDE 10 MG: 10 CAPSULE ORAL at 20:47

## 2020-01-01 RX ADMIN — AMLODIPINE BESYLATE 5 MG: 5 TABLET ORAL at 13:29

## 2020-01-01 RX ADMIN — LIDOCAINE HYDROCHLORIDE 100 MG: 20 INJECTION, SOLUTION INTRAVENOUS at 14:23

## 2020-01-01 RX ADMIN — SODIUM CHLORIDE 20 ML/HR: 0.9 INJECTION, SOLUTION INTRAVENOUS at 13:00

## 2020-01-01 RX ADMIN — Medication 1000 MG: at 08:23

## 2020-01-01 RX ADMIN — ACETAMINOPHEN 650 MG: 325 TABLET, FILM COATED ORAL at 23:28

## 2020-01-01 RX ADMIN — PANTOPRAZOLE SODIUM 40 MG: 40 TABLET, DELAYED RELEASE ORAL at 05:35

## 2020-01-01 RX ADMIN — NORTRIPTYLINE HYDROCHLORIDE 10 MG: 10 CAPSULE ORAL at 21:34

## 2020-01-01 RX ADMIN — METRONIDAZOLE 500 MG: 500 INJECTION, SOLUTION INTRAVENOUS at 09:32

## 2020-01-01 RX ADMIN — GABAPENTIN 300 MG: 300 CAPSULE ORAL at 22:00

## 2020-01-01 RX ADMIN — Medication 250 MG: at 18:01

## 2020-01-01 RX ADMIN — NORTRIPTYLINE HYDROCHLORIDE 10 MG: 10 CAPSULE ORAL at 21:07

## 2020-01-01 RX ADMIN — OXYCODONE HYDROCHLORIDE 5 MG: 5 TABLET ORAL at 05:15

## 2020-01-01 RX ADMIN — AMLODIPINE BESYLATE 5 MG: 5 TABLET ORAL at 09:17

## 2020-01-01 RX ADMIN — KETAMINE HYDROCHLORIDE 30 MG: 50 INJECTION INTRAMUSCULAR; INTRAVENOUS at 09:43

## 2020-01-01 RX ADMIN — ONDANSETRON 4 MG: 2 INJECTION INTRAMUSCULAR; INTRAVENOUS at 14:47

## 2020-01-01 RX ADMIN — METRONIDAZOLE 500 MG: 500 INJECTION, SOLUTION INTRAVENOUS at 00:30

## 2020-01-01 RX ADMIN — DIPHENHYDRAMINE HYDROCHLORIDE 25 MG: 50 INJECTION, SOLUTION INTRAMUSCULAR; INTRAVENOUS at 04:40

## 2020-01-01 RX ADMIN — DOCUSATE SODIUM 100 MG: 100 CAPSULE, LIQUID FILLED ORAL at 17:44

## 2020-01-01 RX ADMIN — IOHEXOL 22 ML: 240 INJECTION, SOLUTION INTRATHECAL; INTRAVASCULAR; INTRAVENOUS; ORAL at 15:03

## 2020-01-01 RX ADMIN — FOLIC ACID 1 MG: 1 TABLET ORAL at 09:56

## 2020-01-01 RX ADMIN — PANTOPRAZOLE SODIUM 40 MG: 40 TABLET, DELAYED RELEASE ORAL at 05:32

## 2020-01-01 RX ADMIN — Medication 250 MG: at 17:00

## 2020-01-01 RX ADMIN — CEFTRIAXONE 2000 MG: 2 INJECTION, POWDER, FOR SOLUTION INTRAMUSCULAR; INTRAVENOUS at 13:00

## 2020-01-01 RX ADMIN — DIPHENHYDRAMINE HYDROCHLORIDE 25 MG: 50 INJECTION, SOLUTION INTRAMUSCULAR; INTRAVENOUS at 12:52

## 2020-01-01 RX ADMIN — PROPOFOL 30 MCG/KG/MIN: 10 INJECTION, EMULSION INTRAVENOUS at 09:43

## 2020-01-01 RX ADMIN — APIXABAN 5 MG: 5 TABLET, FILM COATED ORAL at 17:17

## 2020-01-01 RX ADMIN — CEFTRIAXONE 2000 MG: 2 INJECTION, POWDER, FOR SOLUTION INTRAMUSCULAR; INTRAVENOUS at 13:40

## 2020-01-01 RX ADMIN — METRONIDAZOLE 500 MG: 500 INJECTION, SOLUTION INTRAVENOUS at 18:03

## 2020-01-01 RX ADMIN — IOHEXOL 15 ML: 350 INJECTION, SOLUTION INTRAVENOUS at 13:23

## 2020-01-01 RX ADMIN — MELATONIN 3 MG: at 23:17

## 2020-01-01 RX ADMIN — DOCUSATE SODIUM 100 MG: 100 CAPSULE ORAL at 10:14

## 2020-01-01 RX ADMIN — CEFTRIAXONE 2000 MG: 2 INJECTION, POWDER, FOR SOLUTION INTRAMUSCULAR; INTRAVENOUS at 15:36

## 2020-01-01 RX ADMIN — MAGNESIUM SULFATE IN WATER 2 G: 40 INJECTION, SOLUTION INTRAVENOUS at 21:24

## 2020-01-01 RX ADMIN — THIAMINE HCL TAB 100 MG 100 MG: 100 TAB at 14:14

## 2020-01-01 RX ADMIN — SODIUM CHLORIDE 20 ML/HR: 0.9 INJECTION, SOLUTION INTRAVENOUS at 13:35

## 2020-01-01 RX ADMIN — CEFTRIAXONE SODIUM 1000 MG: 10 INJECTION, POWDER, FOR SOLUTION INTRAVENOUS at 11:57

## 2020-01-01 RX ADMIN — DOCUSATE SODIUM 100 MG: 100 CAPSULE, LIQUID FILLED ORAL at 08:48

## 2020-01-01 RX ADMIN — APIXABAN 5 MG: 5 TABLET, FILM COATED ORAL at 08:11

## 2020-01-01 RX ADMIN — PROPOFOL 60 MCG/KG/MIN: 10 INJECTION, EMULSION INTRAVENOUS at 11:33

## 2020-01-01 RX ADMIN — VALPROATE SODIUM 1000 MG: 100 INJECTION, SOLUTION INTRAVENOUS at 01:09

## 2020-01-01 RX ADMIN — FOLIC ACID 1 MG: 1 TABLET ORAL at 08:13

## 2020-01-01 RX ADMIN — AMLODIPINE BESYLATE 5 MG: 5 TABLET ORAL at 08:48

## 2020-01-01 RX ADMIN — FERROUS SULFATE TAB 325 MG (65 MG ELEMENTAL FE) 325 MG: 325 (65 FE) TAB at 09:56

## 2020-01-01 RX ADMIN — FERROUS SULFATE TAB 325 MG (65 MG ELEMENTAL FE) 325 MG: 325 (65 FE) TAB at 08:37

## 2020-01-01 RX ADMIN — SODIUM CHLORIDE, SODIUM LACTATE, POTASSIUM CHLORIDE, AND CALCIUM CHLORIDE: .6; .31; .03; .02 INJECTION, SOLUTION INTRAVENOUS at 12:35

## 2020-01-01 RX ADMIN — FOLIC ACID TAB 400 MCG 400 MCG: 400 TAB at 14:13

## 2020-01-01 RX ADMIN — APIXABAN 2.5 MG: 2.5 TABLET, FILM COATED ORAL at 09:37

## 2020-01-01 RX ADMIN — DEXAMETHASONE SODIUM PHOSPHATE 10 MG: 10 INJECTION, SOLUTION INTRAMUSCULAR; INTRAVENOUS at 08:11

## 2020-01-01 RX ADMIN — CEFTRIAXONE 2000 MG: 2 INJECTION, POWDER, FOR SOLUTION INTRAMUSCULAR; INTRAVENOUS at 13:37

## 2020-01-01 RX ADMIN — FOLIC ACID 1 MG: 1 TABLET ORAL at 09:18

## 2020-01-01 RX ADMIN — FENTANYL CITRATE 25 MCG: 50 INJECTION, SOLUTION INTRAMUSCULAR; INTRAVENOUS at 10:46

## 2020-01-01 RX ADMIN — FOLIC ACID 1 MG: 1 TABLET ORAL at 09:43

## 2020-01-01 RX ADMIN — THIAMINE HCL TAB 100 MG 100 MG: 100 TAB at 08:22

## 2020-01-01 RX ADMIN — DOCUSATE SODIUM 100 MG: 100 CAPSULE, LIQUID FILLED ORAL at 17:31

## 2020-01-01 RX ADMIN — SODIUM CHLORIDE 75 ML/HR: 0.9 INJECTION, SOLUTION INTRAVENOUS at 10:30

## 2020-01-01 RX ADMIN — ATORVASTATIN CALCIUM 40 MG: 40 TABLET, FILM COATED ORAL at 13:30

## 2020-01-01 RX ADMIN — ATORVASTATIN CALCIUM 40 MG: 40 TABLET, FILM COATED ORAL at 10:07

## 2020-01-01 RX ADMIN — ACETAMINOPHEN 975 MG: 325 TABLET, FILM COATED ORAL at 22:00

## 2020-01-01 RX ADMIN — NORTRIPTYLINE HYDROCHLORIDE 10 MG: 10 CAPSULE ORAL at 21:23

## 2020-01-01 RX ADMIN — LISINOPRIL 5 MG: 5 TABLET ORAL at 10:00

## 2020-01-01 RX ADMIN — ROCURONIUM BROMIDE 40 MG: 50 INJECTION, SOLUTION INTRAVENOUS at 08:11

## 2020-01-01 RX ADMIN — APIXABAN 5 MG: 5 TABLET, FILM COATED ORAL at 10:14

## 2020-01-01 RX ADMIN — APIXABAN 2.5 MG: 2.5 TABLET, FILM COATED ORAL at 21:12

## 2020-01-01 RX ADMIN — MELATONIN 6 MG: at 21:08

## 2020-01-01 RX ADMIN — Medication 250 MG: at 08:12

## 2020-01-01 RX ADMIN — DOCUSATE SODIUM 100 MG: 100 CAPSULE, LIQUID FILLED ORAL at 18:14

## 2020-01-01 RX ADMIN — FOLIC ACID 1 MG: 1 TABLET ORAL at 08:46

## 2020-01-01 RX ADMIN — Medication 250 MG: at 09:43

## 2020-01-01 RX ADMIN — METOCLOPRAMIDE HYDROCHLORIDE 10 MG: 5 INJECTION INTRAMUSCULAR; INTRAVENOUS at 04:40

## 2020-01-01 RX ADMIN — APIXABAN 2.5 MG: 2.5 TABLET, FILM COATED ORAL at 13:43

## 2020-01-01 RX ADMIN — ATORVASTATIN CALCIUM 40 MG: 40 TABLET, FILM COATED ORAL at 09:28

## 2020-01-01 RX ADMIN — CEFTRIAXONE 2000 MG: 2 INJECTION, POWDER, FOR SOLUTION INTRAMUSCULAR; INTRAVENOUS at 13:07

## 2020-01-01 RX ADMIN — SODIUM CHLORIDE 20 ML/HR: 0.9 INJECTION, SOLUTION INTRAVENOUS at 14:40

## 2020-01-01 RX ADMIN — PROPOFOL 150 MG: 10 INJECTION, EMULSION INTRAVENOUS at 14:23

## 2020-01-01 RX ADMIN — FLUTICASONE PROPIONATE 1 SPRAY: 50 SPRAY, METERED NASAL at 08:32

## 2020-01-01 RX ADMIN — ATORVASTATIN CALCIUM 40 MG: 40 TABLET, FILM COATED ORAL at 14:13

## 2020-01-02 NOTE — TELEPHONE ENCOUNTER
Patient of Dr Crystal Kinney, seen in your office on 12/18 for cardiac clearance  He was changed to Eliquis on that day  You mentioned hold on Eliquis in your ov note for the cholecystectomy on 1/14  He is also scheduled for an ERCP on 1/13 with [facility]     GI is asking for a hold on the Eliquis  Can he hold prior to the ERCP on 1/13,  and then through to the clifton? Please advise

## 2020-01-07 NOTE — PRE-PROCEDURE INSTRUCTIONS
Pre-Surgery Instructions:   Medication Instructions    apixaban (ELIQUIS) 5 mg Patient was instructed by Physician and understands   Ascorbic Acid (VITAMIN C) 100 MG tablet Instructed patient per Anesthesia Guidelines   aspirin 81 MG tablet Instructed patient per Anesthesia Guidelines   atorvastatin (LIPITOR) 40 mg tablet Instructed patient per Anesthesia Guidelines   Biotin 1 MG CAPS Instructed patient per Anesthesia Guidelines   CO-ENZYME Q-10 PO Instructed patient per Anesthesia Guidelines   cyanocobalamin (VITAMIN B-12) 100 mcg tablet Instructed patient per Anesthesia Guidelines   ferrous sulfate 325 (65 Fe) mg tablet Instructed patient per Anesthesia Guidelines   folic acid (FOLVITE) 033 MCG tablet Instructed patient per Anesthesia Guidelines   Garlic 10 MG CAPS Instructed patient per Anesthesia Guidelines   Grape Seed 100 MG CAPS Instructed patient per Anesthesia Guidelines   levOCARNitine (L-CARNITINE) 250 MG CAPS Instructed patient per Anesthesia Guidelines   lisinopril (ZESTRIL) 5 mg tablet Instructed patient per Anesthesia Guidelines   Lutein 10 MG TABS Instructed patient per Anesthesia Guidelines   magnesium 30 MG tablet Instructed patient per Anesthesia Guidelines   milk thistle 175 MG tablet Instructed patient per Anesthesia Guidelines   Multiple Vitamins-Minerals (ONE-A-DAY MENS 50+ ADVANTAGE PO) Instructed patient per Anesthesia Guidelines   Omega-3 Fatty Acids (FISH OIL) 1,000 mg Instructed patient per Anesthesia Guidelines   thiamine 100 MG tablet Instructed patient per Anesthesia Guidelines  Pre op instructions reviewed; verbalized understanding  ACE/ARB Med Class     Continue this medication up to the evening before surgery/procedure, but do not take the morning of the day of surgery  ASA Med Class: Aspirin     Should be discontinued at least one week prior to planned operation, unless specifically stated otherwise by surgical service  Your Surgeon may have patient stop taking aspirin up to a week before surgery if having intracranial, middle ear, posterior eye, spine surgery or prostate surgery  [Patients taking aspirin for coronary stents should be reviewed by an anesthesiologist in the optimization clinic  Please do not discontinue aspirin in patients with coronary stents unless given specific permission to do so by the cardiologist who prescribed medication ]   If your surgeon approves please continue to take this medication on your normal schedule  You may take this medication on the morning of your surgery with a sip of water  Direct Xa Inhibitor Med Class     Stop taking this medication at least 3 days prior to surgery/procedure with prescribing Physician and Surgeon consultation  Herbal Med Class     Stop taking this herbal medications at least one week prior to surgery/procedure  Statin Med Class     Continue to take this medication on your normal schedule  If this is an oral medication and you take it in the morning, then you may take this medicine with a sip of water  Vitamin Med Class     You may continue to take any vitamin that your surgeon has prescribed to you up to the day before surgery  If your surgeon has not specifically prescribed this vitamin or instructed you to continue then stop taking 7 days prior to surgery

## 2020-01-09 NOTE — H&P
History and Physical -General Surgical Care   Ronna Pollock 78 y o  male MRN: [id number]  Unit/Bed#:  Encounter: [id number]       Principal Problem: Choledocholithiasis, calculous cholecystitis    HPI: Ronna Pollock is a 78y o  year old male who presents with Choledocholithiasis, calculous cholecystitis    Patient was seen in consultation in early December  That time he did have a common bile duct stone  However since he was on Plavix, GI performed ERCP and placement of a stent to relieve any biliary obstruction  He is planned to undergo an ERCP with stone extraction on Monday January 13th  Laparoscopic cholecystectomy is planned for Tuesday January 14    He has completed a cardiac evaluation  He is presently off his Plavix  Eliquis to be resumed after surgical intervention  Review of Systems    Historical Information   Past Medical History:   Diagnosis Date    Acute ischemic left MCA stroke ([facility] [address] ) 2/4/2016    Aortic stenosis     Gastric ulcer 2017    Hyperlipidemia     Hypertension     TIA (transient ischemic attack)      Past Surgical History:   Procedure Laterality Date    CARDIAC PACEMAKER PLACEMENT      [employer]    CAROTID ENDARTARECTOMY Left 2/10/2016    Procedure: ENDARTERECTOMY ARTERY CAROTID  with patch graft  introp duplex scan;  Surgeon: Kameron Ty MD;  Location: [facility];  Service:     ESOPHAGOGASTRODUODENOSCOPY N/A 5/23/2016    Procedure: ESOPHAGOGASTRODUODENOSCOPY (EGD); Surgeon: Norberto Martins MD;  Location: [facility]; Service:     ESOPHAGOGASTRODUODENOSCOPY N/A 2/8/2016    Procedure: ESOPHAGOGASTRODUODENOSCOPY (EGD); Surgeon: Norberto Martins MD;  Location: [facility];   Service:      Social History   Social History     Substance and Sexual Activity   Alcohol Use Not Currently    Alcohol/week: 6 0 standard drinks    Types: 6 Cans of beer per week     Social History     Substance and Sexual Activity   Drug Use No     Social History     Tobacco Use   Smoking Status Former Smoker    Packs/day: 1 00    Years: 15 00    Pack years: 15 00    Types: Cigarettes    Start date:     Last attempt to quit: Maribell Ellis Years since quittin 0   Smokeless Tobacco Never Used     Family History   Problem Relation Age of Onset    Leukemia Brother     Leukemia Mother        Meds/Allergies     No medications prior to admission  No current facility-administered medications for this encounter  No Known Allergies        There were no vitals taken for this visit  No intake or output data in the 24 hours ending 20 1042    PHYSICAL EXAM  General appearance: alert and oriented, in no acute distress  Lungs: clear to auscultation bilaterally  Heart: regular rate and rhythm, S1, S2 normal, no murmur, click, rub or gallop  Abdomen: soft, non-tender; bowel sounds normal; no masses,  no organomegaly  Rectal: deferred  Skin: Skin color, texture, turgor normal  No rashes or lesions    Lab Results:   No visits with results within 1 Day(s) from this visit     Latest known visit with results is:   Admission on 2019, Discharged on 2019   Component Date Value    WBC 2019 11 18*    RBC 2019 4 73     Hemoglobin 2019 14 3     Hematocrit 2019 43 0     MCV 2019 91     MCH 2019 30 2     MCHC 2019 33 3     RDW 2019 13 2     MPV 2019 10 3     Platelets  113*    nRBC 2019 0     Neutrophils Relative 2019 91*    Immat GRANS % 2019 0     Lymphocytes Relative 2019 4*    Monocytes Relative 2019 5     Eosinophils Relative 2019 0     Basophils Relative 2019 0     Neutrophils Absolute 2019 10 07*    Immature Grans Absolute 2019 0 04     Lymphocytes Absolute 2019 0 46*    Monocytes Absolute 2019 0 59     Eosinophils Absolute 2019 0 00     Basophils Absolute 2019 0 02     Protime 2019 13 2     INR 2019 1 06     PTT 12/05/2019 31     Sodium 12/05/2019 133*    Potassium 12/05/2019 4 2     Chloride 12/05/2019 97*    CO2 12/05/2019 26     ANION GAP 12/05/2019 10     BUN 12/05/2019 21     Creatinine 12/05/2019 0 92     Glucose 12/05/2019 176*    Calcium 12/05/2019 9 2     AST 12/05/2019 108*    ALT 12/05/2019 218*    Alkaline Phosphatase 12/05/2019 252*    Total Protein 12/05/2019 7 7     Albumin 12/05/2019 3 8     Total Bilirubin 12/05/2019 3 70*    eGFR 12/05/2019 78     Lipase 12/05/2019 236     Troponin I 12/05/2019 0 05     LACTIC ACID 12/05/2019 1 8     Magnesium 12/05/2019 1 6     Color, UA 12/05/2019 Yellow     Clarity, UA 12/05/2019 Clear     pH, UA 12/05/2019 6 0     Leukocytes, UA 12/05/2019 Negative     Nitrite, UA 12/05/2019 Negative     Protein, UA 12/05/2019 30 (1+)*    Glucose, UA 12/05/2019 Negative     Ketones, UA 12/05/2019 15 (1+)*    Urobilinogen, UA 12/05/2019 1 0     Bilirubin, UA 12/05/2019 Negative     Blood, UA 12/05/2019 Small*    Specific [place], UA 12/05/2019 1 015     RBC, UA 12/05/2019 1-2*    WBC, UA 12/05/2019 0-1*    Epithelial Cells 12/05/2019 Occasional     Bacteria, UA 12/05/2019 Occasional     Sodium 12/06/2019 136     Potassium 12/06/2019 3 9     Chloride 12/06/2019 102     CO2 12/06/2019 25     ANION GAP 12/06/2019 9     BUN 12/06/2019 18     Creatinine 12/06/2019 0 90     Glucose 12/06/2019 117     Calcium 12/06/2019 8 4     AST 12/06/2019 69*    ALT 12/06/2019 171*    Alkaline Phosphatase 12/06/2019 204*    Total Protein 12/06/2019 6 6     Albumin 12/06/2019 3 1*    Total Bilirubin 12/06/2019 2 79*    eGFR 12/06/2019 80     WBC 12/06/2019 11 05*    RBC 12/06/2019 4 34     Hemoglobin 12/06/2019 13 2     Hematocrit 12/06/2019 40 2     MCV 12/06/2019 93     MCH 12/06/2019 30 4     [facility] 12/06/2019 32 8     RDW 12/06/2019 13 3     Platelets 31/70/2543 98*    MPV 12/06/2019 10 2     Ventricular Rate 12/05/2019 90     Atrial Rate 12/05/2019 92     NE Interval 12/05/2019 220     QRSD Interval 12/05/2019 114     QT Interval 12/05/2019 412     QTC Interval 12/05/2019 504     P Axis 12/05/2019 84     QRS Axis 12/05/2019 41     T Wave Axis 12/05/2019 29     Sodium 12/07/2019 138     Potassium 12/07/2019 3 6     Chloride 12/07/2019 104     CO2 12/07/2019 26     ANION GAP 12/07/2019 8     BUN 12/07/2019 24     Creatinine 12/07/2019 1 13     Glucose 12/07/2019 89     Calcium 12/07/2019 8 0*    AST 12/07/2019 66*    ALT 12/07/2019 92*    Alkaline Phosphatase 12/07/2019 223*    Total Protein 12/07/2019 6 0*    Albumin 12/07/2019 2 7*    Total Bilirubin 12/07/2019 6 23*    eGFR 12/07/2019 61     WBC 12/07/2019 8 87     RBC 12/07/2019 4 07     Hemoglobin 12/07/2019 12 5     Hematocrit 12/07/2019 37 9     MCV 12/07/2019 93     MCH 12/07/2019 30 7     MCHC 12/07/2019 33 0     RDW 12/07/2019 13 3     Platelets 33/24/5310 95*    MPV 12/07/2019 10 4     Sodium 12/08/2019 140     Potassium 12/08/2019 3 2*    Chloride 12/08/2019 104     CO2 12/08/2019 25     ANION GAP 12/08/2019 11     BUN 12/08/2019 28*    Creatinine 12/08/2019 1 02     Glucose 12/08/2019 80     Calcium 12/08/2019 8 0*    AST 12/08/2019 54*    ALT 12/08/2019 50     Alkaline Phosphatase 12/08/2019 208*    Total Protein 12/08/2019 6 0*    Albumin 12/08/2019 2 5*    Total Bilirubin 12/08/2019 2 21*    eGFR 12/08/2019 69      Imaging Studies: I have personally reviewed pertinent reports  ASSESSMENT:  Calculous cholecystitis and choledocholithiasis    PLAN:  Risks and benefits of a laparoscopic cholecystectomy including the potential for bile duct injury, bowel injury, or open surgery were discussed with him and he agrees to proceed  Counseling / Coordination of Care  Total time spent today  20 minutes  Greater than 50% of total time was spent with the patient and / or family counseling and / or coordination of care

## 2020-01-13 NOTE — ANESTHESIA PREPROCEDURE EVALUATION
Review of Systems/Medical History  Patient summary reviewed  Chart reviewed  No history of anesthetic complications     Cardiovascular  EKG reviewed, Exercise tolerance (METS): <4,  Pacemaker/AICD (Medtronic DC PPM), Hyperlipidemia, Hypertension controlled, Valvular heart disease , aortic valve stenosis and mitral regurgitation, Dysrhythmias , atrial fibrillation and 2nd degree block, PVD (carotid stenosis s/p left carotid endarterectomy),   Pulmonary hypertension Pulmonary  Smoker ex-smoker  Cumulative Pack Years: 15,        GI/Hepatic    PUD,             Endo/Other    Obesity    GYN       Hematology    Coagulation disorder (Last dose eliquis 1/10/2020) currently taking oral anticoagulants,   Comment: Gastric ulcer resolved Musculoskeletal  Negative musculoskeletal ROS        Neurology    TIA (in setting of carotid stenosis), CVA (h/o MCA stroke) ,   Comment: Carotid endarterectomy 2017 Psychology       TTE 12/6/19:  LEFT VENTRICLE:  Size was normal   Systolic function was normal  Ejection fraction was estimated to be 55 %  There was moderate concentric hypertrophy      RIGHT VENTRICLE:  The size was normal   Systolic function was normal      MITRAL VALVE:  There was mild regurgitation      AORTIC VALVE:  There was moderate to severe stenosis  There was mild regurgitation      TRICUSPID VALVE:  There was mild regurgitation  Pulmonary artery systolic pressure was moderately to markedly increased  The findings suggest moderate to severe pulmonary hypertension      PERICARDIUM:  A small pericardial effusion was identified posterior to the heart  There was no evidence of hemodynamic compromise      EKG 12/5/19:  Sinus rhythm with occasional Premature atrial complexes with 1st degree A-V block  Septal infarct (cited on or before 11-JUL-2019)  Marked ST abnormality, possible inferior subendocardial injury  Prolonged QT    Cardiac Device Interrogation 11/5/19:  MDANDREA DUAL [address]  AF- NB: PT IN AF SINCE 13:41 TODAY  PRESENTING RHYTHM AF/ @ 85 PPM  PT ON ASA 81MG & CLOPIDOGREL  NOTIFIED DR Olinda Brown  BATTERY VOLTAGE ADEQUATE (11 9 YRS)  AP-74%, >99% (DEPENDENT/CHB)  ALL AVAILABLE LEAD PARAMETERS WITHIN NORMAL LIMITS  NORMAL DEVICE FUNCTION  Lab Results   Component Value Date    WBC 8 87 12/07/2019    HGB 12 5 12/07/2019    HCT 37 9 12/07/2019    MCV 93 12/07/2019    PLT 95 (L) 12/07/2019     Lab Results   Component Value Date    SODIUM 140 12/08/2019    K 3 2 (L) 12/08/2019     12/08/2019    CO2 25 12/08/2019    BUN 28 (H) 12/08/2019    CREATININE 1 02 12/08/2019    GLUC 80 12/08/2019    CALCIUM 8 0 (L) 12/08/2019     Lab Results   Component Value Date    INR 1 06 12/05/2019    INR 1 05 07/11/2019    INR 1 22 (H) 02/11/2016    PROTIME 13 2 12/05/2019    PROTIME 13 3 07/11/2019    PROTIME 15 3 (H) 02/11/2016            Anesthesia Plan  ASA Score- 4     Anesthesia Type- general with ASA Monitors  Additional Monitors: arterial line  Airway Plan: ETT  Comment: Preinduction arterial line  GETA  Plan Factors-    Induction- intravenous  Postoperative Plan- Plan for postoperative opioid use  Planned trial extubation    Informed Consent- Anesthetic plan and risks discussed with patient  I personally reviewed this patient with the CRNA  Discussed and agreed on the Anesthesia Plan with the CRNA  Jean Jenkins

## 2020-01-13 NOTE — ANESTHESIA PROCEDURE NOTES
Arterial Line Insertion  Performed by: Funmilayo Lino MD  Authorized by: Funmilayo Lino MD   Consent: Verbal consent obtained  Written consent obtained  Risks and benefits: risks, benefits and alternatives were discussed  Consent given by: patient  Patient identity confirmed: verbally with patient, arm band and provided demographic data  Preparation: Patient was prepped and draped in the usual sterile fashion    Indications: hemodynamic monitoring  Orientation:  Right  Location: radial artery  Sedation:  Patient sedated: no    Procedure Details:  Needle gauge: 20  Seldinger technique: Seldinger technique used  Number of attempts: 1    Post-procedure:  Patient tolerance: Patient tolerated the procedure well with no immediate complications

## 2020-01-13 NOTE — ANESTHESIA POSTPROCEDURE EVALUATION
Post-Op Assessment Note    CV Status:  Stable  Pain Score: 0    Pain management: adequate     Mental Status:  Alert and awake   Hydration Status:  Euvolemic   PONV Controlled:  Controlled   Airway Patency:  Patent   Post Op Vitals Reviewed: Yes      Staff: Anesthesiologist       Right radial arterial line removed in recovery  Pressure held for 8 minutes, hemostasis achieved, and pressure dressing applied  Patient tolerated well      /85 (01/13/20 1513)    Temp     Pulse 80 (01/13/20 1513)   Resp 18 (01/13/20 1513)    SpO2 100 % (01/13/20 1513)

## 2020-01-14 NOTE — PROGRESS NOTES
Pt rec'd to PACU in respiratory distress/// minimal to no air exchange O2 sat 85%// pt gasping and stating he could not breathe//  Pt bagged and 02 increased to 95%//    Pt was reversed and Dr Melina Squires called here to bedside// Sugammadex was given and pt was able to increase work of breathing// 6l FM then place

## 2020-01-14 NOTE — PLAN OF CARE
Problem: PAIN - ADULT  Goal: Verbalizes/displays adequate comfort level or baseline comfort level  Description  Interventions:  - Encourage patient to monitor pain and request assistance  - Assess pain using appropriate pain scale  - Administer analgesics based on type and severity of pain and evaluate response  - Implement non-pharmacological measures as appropriate and evaluate response  - Consider cultural and social influences on pain and pain management  - Notify physician/advanced practitioner if interventions unsuccessful or patient reports new pain  Outcome: Progressing     Problem: INFECTION - ADULT  Goal: Absence or prevention of progression during hospitalization  Description  INTERVENTIONS:  - Assess and monitor for signs and symptoms of infection  - Monitor lab/diagnostic results  - Monitor all insertion sites, i e  indwelling lines, tubes, and drains  - Monitor endotracheal if appropriate and nasal secretions for changes in amount and color  - [place] appropriate cooling/warming therapies per order  - Administer medications as ordered  - Instruct and encourage patient and family to use good hand hygiene technique  - Identify and instruct in appropriate isolation precautions for identified infection/condition  Outcome: Progressing  Goal: Absence of fever/infection during neutropenic period  Description  INTERVENTIONS:  - Monitor WBC    Outcome: Progressing     Problem: SAFETY ADULT  Goal: Patient will remain free of falls  Description  INTERVENTIONS:  - Assess patient frequently for physical needs  -  Identify cognitive and physical deficits and behaviors that affect risk of falls    -  [place] fall precautions as indicated by assessment   - Educate patient/family on patient safety including physical limitations  - Instruct patient to call for assistance with activity based on assessment  - Modify environment to reduce risk of injury  - Consider OT/PT consult to assist with strengthening/mobility  Outcome: Progressing  Goal: Maintain or return to baseline ADL function  Description  INTERVENTIONS:  -  Assess patient's ability to carry out ADLs; assess patient's baseline for ADL function and identify physical deficits which impact ability to perform ADLs (bathing, care of mouth/teeth, toileting, grooming, dressing, etc )  - Assess/evaluate cause of self-care deficits   - Assess range of motion  - Assess patient's mobility; develop plan if impaired  - Assess patient's need for assistive devices and provide as appropriate  - Encourage maximum independence but intervene and supervise when necessary  - Involve family in performance of ADLs  - Assess for home care needs following discharge   - Consider OT consult to assist with ADL evaluation and planning for discharge  - Provide patient education as appropriate  Outcome: Progressing  Goal: Maintain or return mobility status to optimal level  Description  INTERVENTIONS:  - Assess patient's baseline mobility status (ambulation, transfers, stairs, etc )    - Identify cognitive and physical deficits and behaviors that affect mobility  - Identify mobility aids required to assist with transfers and/or ambulation (gait belt, sit-to-stand, lift, walker, cane, etc )  - [place] fall precautions as indicated by assessment  - Record patient progress and toleration of activity level on Mobility SBAR; progress patient to next Phase/Stage  - Instruct patient to call for assistance with activity based on assessment  - Consider rehabilitation consult to assist with strengthening/weightbearing, etc   Outcome: Progressing     Problem: DISCHARGE PLANNING  Goal: Discharge to home or other facility with appropriate resources  Description  INTERVENTIONS:  - Identify barriers to discharge w/patient and caregiver  - Arrange for needed discharge resources and transportation as appropriate  - Identify discharge learning needs (meds, wound care, etc )  - Arrange for interpretive services to assist at discharge as needed  - Refer to Case Management Department for coordinating discharge planning if the patient needs post-hospital services based on physician/advanced practitioner order or complex needs related to functional status, cognitive ability, or social support system  Outcome: Progressing     Problem: Knowledge Deficit  Goal: Patient/family/caregiver demonstrates understanding of disease process, treatment plan, medications, and discharge instructions  Description  Complete learning assessment and assess knowledge base    Interventions:  - Provide teaching at level of understanding  - Provide teaching via preferred learning methods  Outcome: Progressing

## 2020-01-14 NOTE — ANESTHESIA PREPROCEDURE EVALUATION
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, PVD (carotid stenosis s/p left carotid endarterectomy),   Pulmonary hypertension Pulmonary  Smoker ex-smoker  Cumulative Pack Years: 15,        GI/Hepatic    PUD,             Endo/Other    Obesity    GYN       Hematology    Coagulation disorder (Last dose eliquis 1/10/2020) currently taking oral anticoagulants,   Comment: Gastric ulcer resolved Musculoskeletal  Negative musculoskeletal ROS        Neurology    TIA (in setting of carotid stenosis), CVA (h/o MCA stroke) ,   Comment: Carotid endarterectomy 2017 Psychology       TTE 12/6/19:  LEFT VENTRICLE:  Size was normal   Systolic function was normal  Ejection fraction was estimated to be 55 %  There was moderate concentric hypertrophy      RIGHT VENTRICLE:  The size was normal   Systolic function was normal      MITRAL VALVE:  There was mild regurgitation      AORTIC VALVE:  There was moderate to severe stenosis  There was mild regurgitation      TRICUSPID VALVE:  There was mild regurgitation  Pulmonary artery systolic pressure was moderately to markedly increased  The findings suggest moderate to severe pulmonary hypertension      PERICARDIUM:  A small pericardial effusion was identified posterior to the heart  There was no evidence of hemodynamic compromise      EKG 12/5/19:  Sinus rhythm with occasional Premature atrial complexes with 1st degree A-V block  Septal infarct (cited on or before 11-JUL-2019)  Marked ST abnormality, possible inferior subendocardial injury  Prolonged QT    Cardiac Device Interrogation 11/5/19:  MDT DUAL PM  [address] FOR NEW ONSET AF- NB: PT IN AF SINCE 13:41 TODAY  PRESENTING RHYTHM AF/ @ 85 PPM  PT ON ASA 81MG & CLOPIDOGREL  NOTIFIED DR Alpa Umana  BATTERY VOLTAGE ADEQUATE (11 9 YRS)  AP-74%, >99% (DEPENDENT/CHB)  ALL AVAILABLE LEAD PARAMETERS WITHIN NORMAL LIMITS  NORMAL DEVICE FUNCTION  Lab Results   Component Value Date    WBC 8 87 12/07/2019    HGB 12 5 12/07/2019    HCT 37 9 12/07/2019    MCV 93 12/07/2019    PLT 95 (L) 12/07/2019     Lab Results   Component Value Date    SODIUM 140 12/08/2019    K 3 2 (L) 12/08/2019     12/08/2019    CO2 25 12/08/2019    BUN 28 (H) 12/08/2019    CREATININE 1 02 12/08/2019    GLUC 80 12/08/2019    CALCIUM 8 0 (L) 12/08/2019     Lab Results   Component Value Date    INR 1 06 12/05/2019    INR 1 05 07/11/2019    INR 1 22 (H) 02/11/2016    PROTIME 13 2 12/05/2019    PROTIME 13 3 07/11/2019    PROTIME 15 3 (H) 02/11/2016       Physical Exam    Airway    Mallampati score: III  TM Distance: >3 FB  Neck ROM: full     Dental   No notable dental hx     Cardiovascular      Pulmonary      Other Findings        Anesthesia Plan  ASA Score- 4     Anesthesia Type- general with ASA Monitors  Additional Monitors: arterial line  Airway Plan: ETT  Comment: Left eye conjunctival hemorrhage  Dr Valerie Esteban aware and states no increased risk  Will proceed with procedure        Plan Factors-    Induction- intravenous  Postoperative Plan- Plan for postoperative opioid use  Planned trial extubation    Informed Consent- Anesthetic plan and risks discussed with patient  I personally reviewed this patient with the CRNA  Discussed and agreed on the Anesthesia Plan with the CRNA  Yasmany Broussard

## 2020-01-14 NOTE — INTERVAL H&P NOTE
H&P reviewed  After examining the patient I find no changes in the patients condition since the H&P had been written  Vitals:    01/14/20 0608   BP: 125/70   Pulse: 83   Resp: 16   Temp: 99 °F (37 2 °C)   SpO2: 95%   Underwent ERCP on 01/13/2020  Extraction of small stone  Reviewed risks and benefits of laparoscopic cholecystectomy including the potential for bile duct injury, bowel injury, or open surgery and he agrees to proceed  I have reviewed the cardiology notes    Will be able to resume his Eliquis on 01/17/2020

## 2020-01-14 NOTE — OP NOTE
OPERATIVE REPORT  PATIENT NAME: Hien Oliva    :  1940  MRN: [id number]  Pt Location: [facility] OR ROOM 05    SURGERY DATE: 2020    Surgeon(s) and Role:     * Enedelia Singh DO - Primary     * Mikie Parmar MD - Assisting    Preop Diagnosis:  Choledocholithiasis [K80 50]  Gallstones    Post-Op Diagnosis Codes:     * Choledocholithiasis [K80 50]  Gallstones  Cirrhosis    Procedure(s) (LRB):  CHOLECYSTECTOMY LAPAROSCOPIC (N/A)    Specimen(s):  ID Type Source Tests Collected by Time Destination   1 :  Tissue Gallbladder TISSUE EXAM Enedelia Singh DO 2020 0855        Estimated Blood Loss:   Minimal    Drains:  Closed/Suction Drain Right RLQ Bulb 19 Fr  (Active)   Number of days: 0       Anesthesia Type:   General/local    Operative Indications:  Choledocholithiasis [K80 50]  Gallstones    Operative Findings:  Chronic thickening of the gallbladder  He had global cirrhotic changes of the liver  Liver bed was oozy throughout the case however good hemostasis was achieved once the gallbladder had been removed  ASA 4  Wound class 2  Height 70 in weight 111 kg/244 lb    BMI 35  Cardiovascular  EKG reviewed, Exercise tolerance (METS): <4,  Pacemaker/AICD (Medtronic DC PPM), Hyperlipidemia, Hypertension controlled, Valvular heart disease (HELEN 1 2-1 3) , aortic valve stenosis and mitral regurgitation, Dysrhythmias , atrial fibrillation and 2nd degree block, PVD (carotid stenosis s/p left carotid endarterectomy),   Pulmonary hypertension Pulmonary  Smoker ex-smoker  Cumulative Pack Years: 13,          GI/Hepatic     PUD,                 Endo/Other     Obesity     GYN         Hematology     Coagulation disorder (Last dose eliquis 1/10/2020) currently taking oral anticoagulants,   Comment: Gastric ulcer resolved Musculoskeletal  Negative musculoskeletal ROS          Neurology     TIA (in setting of carotid stenosis), CVA (h/o MCA stroke) ,   Comment: Carotid endarterectomy 2017 Psychology             Complications: None    Procedure and Technique:  Patient was brought the operative suite and identified by visualization, conversation, by armband  Sequential compression pumps were placed  He was given Ancef perioperatively  Once under anesthesia abdomen is then prepped and draped in a sterile fashion  Time-out was performed was assured that the prep was dry  Local was instilled at the supraumbilical fold  Small vertical skin incision was made and the underlying subcutaneous tissues were bluntly dissected with Kocher clamps the fascia  Fascia lifted up and divided the midline  Poked through the underlying peritoneum gaining access into the abdominal cavity  An 11 mm trocar was placed under direct visualization  CO2 was attached creating pneumoperitoneum patient was placed in reverse Trendelenburg  A footboard was utilized anticipating steep reverse Trendelenburg  Three other 5 mm bladeless trocars were placed in the right upper quadrant  Upon inspection of the liver was obviously cirrhotic  Gallbladder had some mild chronically thickening changes noted about it  I did divide the falciform ligament with the Harmonic scalpel  This enabled me to get better left  Gallbladder was lifted cephalad  Some omental attachments to the undersurface of the gallbladder and liver carefully taken down with Harmonic scalpel  Blunt dissection did reveal some of the tissues to be rather oozy  Hemostasis was assured throughout  Careful dissection was carried out towards the cystic duct and cystic artery  Has a sure to stay close to the neck of the gallbladder  These tissues were quite thickened  The epigastric port was changed 11 mm trocar  10 mm clips were then utilized to divide the cystic duct and cystic artery  It was evident there was sludge on the gallbladder side of the cystic duct as we took it directly on the gallbladder itself    Careful dissection was carried out with use of the Harmonic scalpel as well as spatula hot cautery to assure hemostasis  Much of the gallbladder had to be taken off the liver in a dome down fashion once these structures had been divided  Once off the liver it was placed into an Endo-Catch bag  Few bleeding points in the liver bed were controlled with hot cautery  I inspected the cystic duct and cystic artery and all clips were intact without bleeding  Copious irrigation was carried out  I did leave a 23 [place] drain in the gallbladder fossa  This exited the lateral most trocar site  This was anchored to the skin with a 3 0 nylon  Gallbladder was then removed via the umbilical port site within an Endo-Catch bag  Trocars removed releasing pneumoperitoneum fascia umbilical site was closing 0 Vicryl in a figure-eight fashion x2   0 Vicryl was used to close the fascia at the epigastric port site in a simple fashion as well  Local was instilled  Irrigation is carried out  Four Monocryl was used to close skin incisions in a subcuticular fashion  Wounds were washed and dried  Sterile skin glue was applied  He was awakened in the operating returned to the recovery area in stable condition having tolerated procedure well     I was present for the entire procedure    Patient Disposition:  PACU     SIGNATURE: Will Lainez DO  DATE: January 14, 2020  TIME: 9:37 AM

## 2020-01-14 NOTE — PROGRESS NOTES
Upon rolling pt right lower quadrant drain pulled out // Dr Minoo Baer made aware// sutures removed as ordered and sterile gauze placed

## 2020-01-14 NOTE — DISCHARGE INSTRUCTIONS
Please do not take your Aspirin or Eliquis until the morning of Wednesday, 1/22  You can resume all of your other medications today  Laparoscopic Cholecystectomy    WHAT YOU SHOULD KNOW:    Cholecystitis is inflammation of your gallbladder  Your gallbladder stores bile, which helps break down the fat that you eat  It also helps remove certain chemicals from your body  You may have a sudden, severe attack (acute cholecystitis) or several mild attacks (chronic cholecystitis)  Laparoscopic cholecystectomy is surgery to remove your gallbladder  During this surgery, small incisions are made in your abdomen  A small scope and special tools are inserted through these incisions  Your gallbladder is removed from it normal location and taken out of your abdomen  The incisions are closed with sutures and a small amount of glue is applied over top incisions to help reinforce the incisions to optimize healing          AFTER YOU LEAVE:  Following discharge from the hospital, you may have some questions about your procedure, your activities or your general condition  These instructions may answer some of your questions and help you adjust during the first few days following your operation  You can expect to be sore and tender mostly around the incisions  This pain should last approximally 5 days and gradually improve daily  Incisions: Your doctor may have chosen to use a type of adhesive glue, to close your incision  The glue is used to cover the incision, assist in closure, and prevent contamination so to optimize healing  This adhesive glue will darken and may appear as a purple film over the incision  Do not pick at the glue, it will peel away on its own within one to two weeks  You may apply ice to the incisions to help with pain  Avoid heat as this my make the glue tacky   It is normal to have some bruising, swelling or mild discoloration around the incision    If increasing redness or pain develops, call our office immediately  You may wash the incision gently with soap and water then pat dry  Do not apply any creams or ointments  Dressings: You do not usually need to keep incisions covered with a dressing  A dressing is only required if you had a drain following your surgery and the drain was removed prior to discharge  If a dressing is required the doctor will discuss the dressing with prior to leaving  You may remove the dressing for showering, but reapply when dry  Bathing: You may shower daily with soap and water the day after the procedure  It is OK to GENTLY wash the incision with soap and water then pat dry  Do NOT soak incision in a tub, pool, or hot tub for 2 weeks  Diet: Eat low-fat foods for 4 to 6 weeks while your body learns to digest fat without a gallbladder  Slowly increase the amount of fat that you eat  We recommend you slowly advance your diet  Try to start with softer bland foods and gradually advance as tolerated  Be sure to consume plenty of water  Avoid alcohol  Activity/Restrictions: The evening following the procedure you should rest as much as possible, sitting, lying or reclining  you should be sure someone remains with you until the next morning  Gradually increase your activity daily  Walking 3-4 daily is good and  stairs are ok  Listen to your body  If you start to get tired or sore then rest    No strenuous activity or exercise for 3-4 weeks  No driving for 5 days or while taking narcotics for pain  Return or work: You may return to work or other activities as soon as your pain is controlled and you feel comfortable  For many people, this is 5 to 7 days after surgery  If your job requires heavy lifting you will need to be on light duty for 2-3 weeks  Follow up appointment: following discharge from the hospital call the office in 1-2 days to set up a post operative appointment to be seen in 2-3 weeks    The phone number and address should be provided in your discharge paper work  Medication: Please take all medications as prescribed  Call your healthcare provider if you think your medicine is not helping or if you have side effects  If you were given a prescription for Percocet, Norco, or Vicodin for pain be sure to eat prior to taking as these medications as they may cause nausea and vomiting on an empty stomach  DO NOT take Tylenol with these medication for a fever or for further pain control as these medications already contain Tylenol in them  May resume Eliquis in 1 week  01/21/2020     Contact your healthcare provider if:   · You have a fever over 101°F (38°C) or chills  · You have pain or nausea that is not relieved by medicine  · You have redness and swelling around your incisions, or blood or pus is leaking from your incisions  · You are constipated or have diarrhea  · Your skin or eyes are yellow, or your bowel movements are pale  · You have questions or concerns about your surgery, condition, or care  Seek care immediately or call 911 if:   · You cannot stop vomiting  · Your bowel movements are black or bloody  · You have pain in your abdomen and it is swollen or hard  · Your arm or leg feels warm, tender, and painful  It may look swollen and red  · You feel lightheaded, short of breath, and have chest pain  · You cough up blood

## 2020-01-14 NOTE — ANESTHESIA POSTPROCEDURE EVALUATION
Post-Op Assessment Note    CV Status:  Stable  Pain Score: 0    Pain management: adequate     Mental Status:  Arousable and sleepy   Hydration Status:  Euvolemic   PONV Controlled:  Controlled   Airway Patency:  Patent   Post Op Vitals Reviewed: Yes      Staff: AnesthesiologistQUAN           BP  185/79   Temp      Pulse  80   Resp   20   SpO2   99

## 2020-01-15 NOTE — DISCHARGE SUMMARY
Discharge Summary - Lara Suresh 78 y o  male MRN: [id number]    Unit/Bed#: -01 Encounter: [id number]    Admission Date:   Admission Orders (From admission, onward)     Ordered        01/14/20 1345  Place in Observation  Once                     Admitting Diagnosis: Choledocholithiasis [K80 50]  At risk for obstructive sleep apnea [Z91 89]    HPI: Lara Suresh is a 78y o  year old male who presents with Choledocholithiasis, calculous cholecystitis     Patient was seen in consultation in early December  That time he did have a common bile duct stone  However since he was on Plavix, GI performed ERCP and placement of a stent to relieve any biliary obstruction  He is planned to undergo an ERCP with stone extraction on Monday January 13th  Laparoscopic cholecystectomy is planned for Tuesday January 14     He has completed a cardiac evaluation  He is presently off his Plavix  Eliquis to be resumed after surgical intervention  - HPI written by Dr Sarahy Morrissey on 1/14/2020    Procedures Performed: Laparoscopic cholecystectomy     Summary of Hospital Course: Patient underwent a laparoscopic cholecystectomy on 1/14 for the above indications  For full operative report please see the op note written by Dr Sarahy Morrissey on 1/14  Intra-operative findings were "Chronic thickening of the gallbladder  He had global cirrhotic changes of the liver  Liver bed was oozy throughout the case however good hemostasis was achieved once the gallbladder had been removed " Patient had a ALEX drain placed intra-operatively  In PACU the drain fell out accidentally  He was admitted overnight for observation  He tolerated a regular diet, was urinating, passing flatus and pain was controlled post-operatively  On POD1, surgical team deemed the  Patient stable for discharge  He was discharged home with 2 week follow up in the outpatient clinic with Dr Sarahy Morrissey  Patient was advised to not take his Aspirin or Eliquis until 1/22   All questions were answered prior to discharge  Significant Findings, Care, Treatment and Services Provided: Chronic thickening of the gall bladder and cirrhotic changes of the liver  Complications: None    Discharge Diagnosis: Choledocholithiasis [K80 50]    Resolved Problems  Date Reviewed: 1/14/2020    None          Condition at Discharge: good         Discharge instructions/Information to patient and family:   See after visit summary for information provided to patient and family  Provisions for Follow-Up Care:  See after visit summary for information related to follow-up care and any pertinent home health orders  PCP: Quinn Coppola MD    Disposition: Home    Planned Readmission: No      Discharge Statement   I spent 20 minutes discharging the patient  This time was spent on the day of discharge  I had direct contact with the patient on the day of discharge  Additional documentation is required if more than 30 minutes were spent on discharge  Discharge Medications:  See after visit summary for reconciled discharge medications provided to patient and family

## 2020-01-15 NOTE — PROGRESS NOTES
Progress Note - General Surgery   Amena Wong 78 y o  male MRN: [id number]  Unit/Bed#: -01 Encounter: [id number]    Assessment:  78 y o  M w hx of choledocholithiasis and cholelithiasis, now s/p laparoscopic cholecystectomy 1/14, POD1  GB with chronic thickening and global cirrhotic changes    Plan:  Regular diet  Prn analgesia  OOB/Ambulate  Anticipate discharge today, 1/15      Subjective/Objective     Subjective: No acute events overnight  Pain is controlled on prn analgesia  +flatulence  Tolerating diet without nausea/vomiting  No fevers, chills  Objective:     Blood pressure 139/65, pulse 73, temperature 97 7 °F (36 5 °C), temperature source Oral, resp  rate 18, height 5' 10" (1 778 m), weight 111 kg (244 lb), SpO2 95 %  ,Body mass index is 35 01 kg/m²  Intake/Output Summary (Last 24 hours) at 1/15/2020 0804  Last data filed at 1/15/2020 9229  Gross per 24 hour   Intake 1820 ml   Output 400 ml   Net 1420 ml       Invasive Devices     Peripheral Intravenous Line            Peripheral IV 01/14/20 Left Hand 1 day                Physical Exam:   NAD, alert and oriented x3  Normocephalic, atraumatic  MMM, EOMI, PERRLA  Norm resp effort on RA  RRR  Abd soft, obese abdomen, ND, tender periumbilically, incisions c/d/i  No drainage from drain site incision     No calf tenderness or peripheral edema  Motor/sensation intact in distal extremities  CN grossly intact  -rash/lesions      Lab, Imaging and other studies:CBC: No results found for: WBC, HGB, HCT, MCV, PLT, ADJUSTEDWBC, MCH, MCHC, RDW, MPV, NRBC, CMP: No results found for: SODIUM, K, CL, CO2, ANIONGAP, BUN, CREATININE, GLUCOSE, CALCIUM, AST, ALT, ALKPHOS, PROT, BILITOT, EGFR  VTE Pharmacologic Prophylaxis: Heparin  VTE Mechanical Prophylaxis: sequential compression device

## 2020-01-27 NOTE — PROGRESS NOTES
Results for orders placed or performed in visit on 01/27/20   Cardiac EP device report    Narrative    MDT DUAL PM  CARELINK TRANSMISSION: BATTERY VOLTAGE ADEQUATE (11 5 YRS)  AP: 53 4%  : 99 7% (>40%~CHB)  ALL AVAILABLE LEAD PARAMETERS WITHIN NORMAL LIMITS  1 VT EPISODE W/ EGRAM SHOWING NSVT 8 BEATS @ 194 BPM   5 AT/AF EPISODES W/ AVAIL EGRAMS SHOWING AF, MAX DURATION 46 HRS, 17 MINS  PT WAS D/C ON ELIQUIS, TO START OF 01/14/20 (NOT IN MED LIST; HOWEVER, ON D/C SUMMARY)  PT DOES NOT TAKE BB   EF: 55% (ECHO 12/6/19)  TASKED TO DR Trenton Eisenmenger   PACEMAKER FUNCTIONING APPROPRIATELY    [address] [place] [address]

## 2020-02-13 PROBLEM — I48.0 PAROXYSMAL ATRIAL FIBRILLATION (HCC): Status: ACTIVE | Noted: 2020-01-01

## 2020-02-13 PROBLEM — I35.0 NONRHEUMATIC AORTIC VALVE STENOSIS: Status: RESOLVED | Noted: 2019-08-20 | Resolved: 2020-01-01

## 2020-02-13 NOTE — PROGRESS NOTES
Cardiology Follow Up    Ronna Pollock  1940  [id number]  [facility]  [address]  [address]  [phone]  [phone]    1  Nonrheumatic aortic valve stenosis     2  Paroxysmal atrial fibrillation (HCC)     3  Cerebrovascular accident (CVA), unspecified mechanism ([address] )     4  Heart block  POCT ECG   5  Essential hypertension     6  Mixed hyperlipidemia     7  Medtronic [place] XT MRI safe dual chamber PM           Discussion/Summary: All of his assessed cardiac problems are stable  I have reviewed his medications and made no changes  No further cardiac testing is needed at this time  RTO 6 months  I advised him to call if he notices any SOB or decrease in his exercise tolerance to suggest symptoms from his AS  Interval History: He has not had any cardiac problems since his last office visit  He is active and denies CP, significant SOB, palpitations  His pacemaker showed some PAF  He is on Eliquis  ECG today - AV paced rhythm  ECHO 12/6/2019 - EF 55%, moderate to severe AS  He recently had laparoscopic cholecystectomy          Patient Active Problem List   Diagnosis    TIA involving carotid artery    Hypertension    CVA (cerebral vascular accident) ([address] )    Carotid artery stenosis with cerebral infarction ([address] )    Heart block    Hyperlipidemia    Fracture of multiple ribs of left side    History of recent fall    Medtronic Randi XT MRI safe dual chamber PM    Aortic stenosis    History of CVA (cerebrovascular accident)    Acute cholecystitis    Choledocholithiasis    Transaminitis    Pericardial effusion    Pulmonary HTN ([address] )    Atrial fibrillation, new onset ([address] )    Paroxysmal atrial fibrillation ([address] )     Past Medical History:   Diagnosis Date    Acute ischemic left MCA stroke ([address] ) 2/4/2016    Aortic stenosis     Biliary stent obstruction, initial encounter     Gastric ulcer 2017    Hyperlipidemia     Hypertension     TIA (transient ischemic attack)      Social History     Socioeconomic History    Marital status: /Civil Union     Spouse name: Not on file    Number of children: Not on file    Years of education: Not on file    Highest education level: Not on file   Occupational History    Not on file   Social Needs    Financial resource strain: Not on file    Food insecurity:     Worry: Not on file     Inability: Not on file    Transportation needs:     Medical: Not on file     Non-medical: Not on file   Tobacco Use    Smoking status: Former Smoker     Packs/day: 1 00     Years: 15 00     Pack years: 15 00     Types: Cigarettes     Start date:      Last attempt to quit:      Years since quittin 1    Smokeless tobacco: Never Used   Substance and Sexual Activity    Alcohol use: Not Currently     Alcohol/week: 6 0 standard drinks     Types: 6 Cans of beer per week    Drug use: No    Sexual activity: Not on file   Lifestyle    Physical activity:     Days per week: Not on file     Minutes per session: Not on file    Stress: Not on file   Relationships    Social connections:     Talks on phone: Not on file     Gets together: Not on file     Attends Restorationism service: Not on file     Active member of club or organization: Not on file     Attends meetings of clubs or organizations: Not on file     Relationship status: Not on file    Intimate partner violence:     Fear of current or ex partner: Not on file     Emotionally abused: Not on file     Physically abused: Not on file     Forced sexual activity: Not on file   Other Topics Concern    Not on file   Social History Narrative    Ret   Lives in [address] [place] [address]  High school education  Wife manages finances        Family History   Problem Relation Age of Onset    Leukemia Brother     Leukemia Mother      Past Surgical History:   Procedure Laterality Date    CARDIAC PACEMAKER PLACEMENT      Medtronic    CAROTID ENDARTARECTOMY Left 2/10/2016    Procedure: ENDARTERECTOMY ARTERY CAROTID  with patch graft  introp duplex scan;  Surgeon: Lennox Rankin, MD;  Location: [facility];  Service:     ESOPHAGOGASTRODUODENOSCOPY N/A 5/23/2016    Procedure: ESOPHAGOGASTRODUODENOSCOPY (EGD); Surgeon: David Antunez MD;  Location: [facility]; Service:     ESOPHAGOGASTRODUODENOSCOPY N/A 2/8/2016    Procedure: ESOPHAGOGASTRODUODENOSCOPY (EGD); Surgeon: David Antunez MD;  Location: [facility]; Service:     PA LAP,CHOLECYSTECTOMY N/A 1/14/2020    Procedure: CHOLECYSTECTOMY LAPAROSCOPIC;  Surgeon: Will Lainez DO;  Location: [facility];  Service: General       Current Outpatient Medications:     Apixaban (ELIQUIS PO), Take by mouth 2 (two) times a day, Disp: , Rfl:     Ascorbic Acid (VITAMIN C) 100 MG tablet, Take 100 mg by mouth daily, Disp: , Rfl:     atorvastatin (LIPITOR) 40 mg tablet, Take 1 tablet (40 mg total) by mouth daily, Disp: 30 tablet, Rfl: 0    Biotin 1 MG CAPS, Take 1 mg by mouth daily , Disp: , Rfl:     CO-ENZYME Q-10 PO, Take 1 tablet by mouth daily  , Disp: , Rfl:     cyanocobalamin (VITAMIN B-12) 100 mcg tablet, Take 50 mcg by mouth daily, Disp: , Rfl:     ferrous sulfate 325 (65 Fe) mg tablet, Take 325 mg by mouth daily with breakfast , Disp: , Rfl:     folic acid (FOLVITE) 273 MCG tablet, Take 400 mcg by mouth daily  , Disp: , Rfl:     Garlic 10 MG CAPS, Take 10 mg by mouth daily , Disp: , Rfl:     Grape Seed 100 MG CAPS, Take 100 mg by mouth daily , Disp: , Rfl:     levOCARNitine (L-CARNITINE) 250 MG CAPS, Take 250 mg by mouth daily , Disp: , Rfl:     lisinopril (ZESTRIL) 5 mg tablet, Take 1 tablet (5 mg total) by mouth daily, Disp: 30 tablet, Rfl: 0    Lutein 10 MG TABS, Take 10 mg by mouth daily , Disp: , Rfl:     magnesium 30 MG tablet, Take 30 mg by mouth 2 (two) times a day, Disp: , Rfl:     milk thistle 175 MG tablet, Take 175 mg by mouth daily, Disp: , Rfl:    Multiple Vitamins-Minerals (ONE-A-DAY MENS 50+ ADVANTAGE PO), Take 1 capsule by mouth daily , Disp: , Rfl:     Omega-3 Fatty Acids (FISH OIL) 1,000 mg, Take 1,000 mg by mouth daily  , Disp: , Rfl:     thiamine 100 MG tablet, Take 100 mg by mouth 2 (two) times a day , Disp: , Rfl:   No Known Allergies  Vitals:    02/11/20 1346 02/11/20 1358   BP: 148/72 148/70   BP Location: Left arm Right arm   Patient Position: Sitting Sitting   Cuff Size: Large Standard   Pulse: 73    SpO2: 96%    Weight: 111 kg (244 lb 12 8 oz)    Height: 5' 10" (1 778 m)      Weight (last 2 days)     Date/Time   Weight    02/11/20 1346   111 (244 8)             Blood pressure 148/70, pulse 73, height 5' 10" (1 778 m), weight 111 kg (244 lb 12 8 oz), SpO2 96 %  , Body mass index is 35 13 kg/m²  Labs:  Admission on 01/14/2020, Discharged on 01/15/2020   Component Date Value    Case Report 01/14/2020                      Value:Surgical Pathology Report                         Case: [id number]                                   Authorizing Provider:  Candice Antoine DO         Collected:           01/14/2020 0540              Ordering Location:     [address] [place] [address]      Received:            01/14/2020 [address] [place] [address] Operating Room                                                      Pathologist:           Faye Garcia MD                                                                Specimen:    Gallbladder                                                                                Final Diagnosis 01/14/2020                      Value: This result contains rich text formatting which cannot be displayed here   Note 01/14/2020                      Value: This result contains rich text formatting which cannot be displayed here   Additional Information 01/14/2020                      Value: This result contains rich text formatting which cannot be displayed here     Yaneli Best Description 01/14/2020 Value:This result contains rich text formatting which cannot be displayed here      Clinical Information 01/14/2020                      Value:Choledocholithiasis   Admission on 12/05/2019, Discharged on 12/08/2019   Component Date Value    WBC 12/05/2019 11 18*    RBC 12/05/2019 4 73     Hemoglobin 12/05/2019 14 3     Hematocrit 12/05/2019 43 0     MCV 12/05/2019 91     MCH 12/05/2019 30 2     MCHC 12/05/2019 33 3     RDW 12/05/2019 13 2     MPV 12/05/2019 10 3     Platelets 45/19/6376 113*    nRBC 12/05/2019 0     Neutrophils Relative 12/05/2019 91*    Immat GRANS % 12/05/2019 0     Lymphocytes Relative 12/05/2019 4*    Monocytes Relative 12/05/2019 5     Eosinophils Relative 12/05/2019 0     Basophils Relative 12/05/2019 0     Neutrophils Absolute 12/05/2019 10 07*    Immature Grans Absolute 12/05/2019 0 04     Lymphocytes Absolute 12/05/2019 0 46*    Monocytes Absolute 12/05/2019 0 59     Eosinophils Absolute 12/05/2019 0 00     Basophils Absolute 12/05/2019 0 02     Protime 12/05/2019 13 2     INR 12/05/2019 1 06     PTT 12/05/2019 31     Sodium 12/05/2019 133*    Potassium 12/05/2019 4 2     Chloride 12/05/2019 97*    CO2 12/05/2019 26     ANION GAP 12/05/2019 10     BUN 12/05/2019 21     Creatinine 12/05/2019 0 92     Glucose 12/05/2019 176*    Calcium 12/05/2019 9 2     AST 12/05/2019 108*    ALT 12/05/2019 218*    Alkaline Phosphatase 12/05/2019 252*    Total Protein 12/05/2019 7 7     Albumin 12/05/2019 3 8     Total Bilirubin 12/05/2019 3 70*    eGFR 12/05/2019 78     Lipase 12/05/2019 236     Troponin I 12/05/2019 0 05     LACTIC ACID 12/05/2019 1 8     Magnesium 12/05/2019 1 6     Color, UA 12/05/2019 Yellow     Clarity, UA 12/05/2019 Clear     pH, UA 12/05/2019 6 0     Leukocytes, UA 12/05/2019 Negative     Nitrite, UA 12/05/2019 Negative     Protein, UA 12/05/2019 30 (1+)*    Glucose, UA 12/05/2019 Negative     Ketones, UA 12/05/2019 15 (1+)*    Urobilinogen, UA 12/05/2019 1 0     Bilirubin, UA 12/05/2019 Negative     Blood, UA 12/05/2019 Small*    Specific [place], UA 12/05/2019 1 015     RBC, UA 12/05/2019 1-2*    WBC, UA 12/05/2019 0-1*    Epithelial Cells 12/05/2019 Occasional     Bacteria, UA 12/05/2019 Occasional     Sodium 12/06/2019 136     Potassium 12/06/2019 3 9     Chloride 12/06/2019 102     CO2 12/06/2019 25     ANION GAP 12/06/2019 9     BUN 12/06/2019 18     Creatinine 12/06/2019 0 90     Glucose 12/06/2019 117     Calcium 12/06/2019 8 4     AST 12/06/2019 69*    ALT 12/06/2019 171*    Alkaline Phosphatase 12/06/2019 204*    Total Protein 12/06/2019 6 6     Albumin 12/06/2019 3 1*    Total Bilirubin 12/06/2019 2 79*    eGFR 12/06/2019 80     WBC 12/06/2019 11 05*    RBC 12/06/2019 4 34     Hemoglobin 12/06/2019 13 2     Hematocrit 12/06/2019 40 2     MCV 12/06/2019 93     MCH 12/06/2019 30 4     MCHC 12/06/2019 32 8     RDW 12/06/2019 13 3     Platelets 22/74/4081 98*    MPV 12/06/2019 10 2     Ventricular Rate 12/05/2019 90     Atrial Rate 12/05/2019 92     MI Interval 12/05/2019 220     QRSD Interval 12/05/2019 114     QT Interval 12/05/2019 412     QTC Interval 12/05/2019 504     P Axis 12/05/2019 84     QRS Axis 12/05/2019 41     T Wave Axis 12/05/2019 29     Sodium 12/07/2019 138     Potassium 12/07/2019 3 6     Chloride 12/07/2019 104     CO2 12/07/2019 26     ANION GAP 12/07/2019 8     BUN 12/07/2019 24     Creatinine 12/07/2019 1 13     Glucose 12/07/2019 89     Calcium 12/07/2019 8 0*    AST 12/07/2019 66*    ALT 12/07/2019 92*    Alkaline Phosphatase 12/07/2019 223*    Total Protein 12/07/2019 6 0*    Albumin 12/07/2019 2 7*    Total Bilirubin 12/07/2019 6 23*    eGFR 12/07/2019 61     WBC 12/07/2019 8 87     RBC 12/07/2019 4 07     Hemoglobin 12/07/2019 12 5     Hematocrit 12/07/2019 37 9     MCV 12/07/2019 93     MCH 12/07/2019 30 7     MCHC 12/07/2019 33 0     RDW 12/07/2019 13 3     Platelets 14/02/2456 95*    MPV 12/07/2019 10 4     Sodium 12/08/2019 140     Potassium 12/08/2019 3 2*    Chloride 12/08/2019 104     CO2 12/08/2019 25     ANION GAP 12/08/2019 11     BUN 12/08/2019 28*    Creatinine 12/08/2019 1 02     Glucose 12/08/2019 80     Calcium 12/08/2019 8 0*    AST 12/08/2019 54*    ALT 12/08/2019 50     Alkaline Phosphatase 12/08/2019 208*    Total Protein 12/08/2019 6 0*    Albumin 12/08/2019 2 5*    Total Bilirubin 12/08/2019 2 21*    eGFR 12/08/2019 69    Office Visit on 08/20/2019   Component Date Value    Interpretation 08/20/2019       Imaging: No results found  Review of Systems:  Review of Systems   Constitutional: Negative for diaphoresis, fatigue, fever and unexpected weight change  HENT: Negative  Respiratory: Negative for cough, shortness of breath and wheezing  Cardiovascular: Negative for chest pain, palpitations and leg swelling  Gastrointestinal: Negative for abdominal pain, diarrhea and nausea  Musculoskeletal: Negative for gait problem and myalgias  Skin: Negative for rash  Neurological: Negative for dizziness and numbness  Psychiatric/Behavioral: Negative  Physical Exam:  Physical Exam   Constitutional: He is oriented to person, place, and time  He appears well-developed and well-nourished  HENT:   Head: Normocephalic and atraumatic  Eyes: Pupils are equal, round, and reactive to light  Neck: Normal range of motion  Neck supple  No JVD present  Cardiovascular: Regular rhythm, S1 normal, S2 normal and normal pulses  Murmur heard  Systolic murmur is present with a grade of 4/6  Pulses:       Carotid pulses are 2+ on the right side, and 2+ on the left side  Pulmonary/Chest: Effort normal and breath sounds normal  He has no wheezes  He has no rales  Abdominal: Soft  Bowel sounds are normal  There is no tenderness  Musculoskeletal: Normal range of motion   He exhibits no edema or tenderness  Neurological: He is alert and oriented to person, place, and time  He has normal reflexes  No cranial nerve deficit  Skin: Skin is warm  Psychiatric: He has a normal mood and affect

## 2020-07-31 NOTE — PROGRESS NOTES
Results for orders placed or performed in visit on 07/31/20   Cardiac EP device report    Narrative    MDT DUAL PM  CARELINK TRANSMISSION: BATTERY VOLTAGE ADEQUATE (11 YRS)  AP 84%  99 9% (AVB - DDDR 60)  ALL AVAILABLE LEAD PARAMETERS WITHIN NORMAL LIMITS  1 AHR EPISODE W/ DURATION 17 SECS W/EGRM SHOWING PAT  AF BURDEN <0 1%  PT TAKES ELIQUIS  NORMAL DEVICE FUNCTION     EB

## 2020-09-28 PROBLEM — R51.9 HEADACHE: Status: ACTIVE | Noted: 2020-01-01

## 2020-09-29 PROBLEM — N17.9 ACUTE KIDNEY INJURY (HCC): Status: ACTIVE | Noted: 2020-01-01

## 2020-10-01 PROBLEM — G93.41 ACUTE METABOLIC ENCEPHALOPATHY: Status: ACTIVE | Noted: 2020-01-01

## 2020-10-02 PROBLEM — R65.10 SIRS (SYSTEMIC INFLAMMATORY RESPONSE SYNDROME) (HCC): Status: ACTIVE | Noted: 2020-01-01

## 2020-10-02 PROBLEM — G93.41 ACUTE METABOLIC ENCEPHALOPATHY: Status: RESOLVED | Noted: 2020-01-01 | Resolved: 2020-01-01

## 2020-10-03 PROBLEM — A41.9 SEPSIS (HCC): Status: ACTIVE | Noted: 2020-01-01

## 2020-10-06 PROBLEM — B95.5 BACTEREMIA DUE TO STREPTOCOCCUS: Status: ACTIVE | Noted: 2020-01-01

## 2020-10-06 PROBLEM — R78.81 BACTEREMIA DUE TO STREPTOCOCCUS: Status: ACTIVE | Noted: 2020-01-01

## 2020-10-06 PROBLEM — I07.9 ENDOCARDITIS OF TRICUSPID VALVE: Status: ACTIVE | Noted: 2020-01-01

## 2020-10-06 PROBLEM — E66.9 OBESITY (BMI 30-39.9): Status: ACTIVE | Noted: 2020-01-01

## 2020-10-30 PROBLEM — D64.9 ANEMIA: Status: ACTIVE | Noted: 2020-01-01

## 2020-11-20 PROBLEM — I48.91 ATRIAL FIBRILLATION, NEW ONSET (HCC): Status: RESOLVED | Noted: 2019-01-01 | Resolved: 2020-01-01

## 2023-08-16 NOTE — PROGRESS NOTES
Transfer Note - Billy Thompson 1940, [de-identified] y o  male MRN: [id number]     Unit/Bed#: [facility] 513-01 Encounter: [id number]  Date and time admitted to hospital: 7/11/2019  7:21 AM  * Heart block  Assessment & Plan  PPM placement today  Appreciate cardiology input    History of recent fall  Assessment & Plan  Supportive care    Fracture of multiple ribs of left side  Assessment & Plan  Pain management  Evaluated and treated prior to this admission      Code Status: Level 1 - Full Code    Reason for ICU admission:   Dopamine and heart block    Active problems:   Principal Problem:    Heart block  Active Problems:    History of recent fall    Fracture of multiple ribs of left side    CVA (cerebral vascular accident) ([facility] [address] )    Carotid artery stenosis with cerebral infarction ([facility] [address] )    Hypertension    Hyperlipidemia  Resolved Problems:    * No resolved hospital problems  *    Consultants:   cardiology    History of Present Illness:   Per H and P 7/11 by Mary Jane Andres PA-C, "Billy Thompson is a [de-identified] y o  male who presents with complaints of dizziness and a rushing feeling to his head  The symptoms have occurred for approximately 10 days and caused him to fall twice  On 06/25/2019 a chest x-ray was completed after he fell and showed left-sided 5th and 6th rib fractures  He has no pain from his fractures at this time  Today, the patient felt that the symptoms were a bit worse and he could not mow his lawn yesterday  It prompted him to call EMS and he was transported to [facility] Emergency Department  While in route, the his heart rate was noted to be in the 30s  Upon arrival to emergency department an EKG was completed and showed paroxysmal atrioventricular block  The patient was placed on dopamine at that time    Electrophysiology examined the patient in the emergency department and determined that the patient should be admitted to intensive care unit and will need pacemaker placement      The patient's chart Heme/Onc Heme/Onc shows he has a significant past medical history of a left MCA stroke, 02/02/2016, 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  He also has a history of hyperlipidemia, essential tremor, hypertension, healed gastric ulcer status post EGD, anemia and hypertension  A review of the patient's chart from a previous admission listed that the patient was taking Plavix, aspirin, Lipitor with Co Q10, Protonix, thiamine, folic acid, and fish oil  However, the patient states that he only takes aspirin, thiamine, folic acid, fish oil and Sol palmetto at this time  History obtained from his wife was obtained and she stated that he has seasonal allergies and takes Claritin  In a previous no from the patient's chart listed hypothyroidism in his past medical history  The patient and his daughter provides history that he has never been diagnosed with hypothyroidism  A TSH 3rd generation was drawn in the emergency department and resulted at 2 210  He does not take any blood pressure medications at this time "    Summary of clinical course:   Patient was seen by cardiology and was planned for PPM today  He remained stabe  On dopamine 5 overnight and went down for PPM this AM  He is stable for transfer to the floor  Recent or scheduled procedures:   7/12 PPM implant    Outstanding/pending diagnostics:   none    Cultures:   none       Mobilization Plan:   OOB as able    Nutrition Plan:   PO diet post procedure    VTE Pharmacologic Prophylaxis: Enoxaparin (Lovenox)  VTE Mechanical Prophylaxis: sequential compression device    Discharge Plan:   Patient should be ready for discharge to home on 7/13 after 24 hours post-PPM placement  Spoke with [facility] resident for Dr Kathi Montalvo  regarding transfer  Please call  with any questions or concerns  Portions of the record may have been created with voice recognition software    Occasional wrong word or "sound a like" substitutions may have Critical Care Critical Care Heme/Onc occurred due to the inherent limitations of voice recognition software  Read the chart carefully and recognize, using context, where substitutions have occurred  Critical Care Heme/Onc Heme/Onc Heme/Onc Critical Care Critical Care Heme/Onc Critical Care Heme/Onc Critical Care Critical Care Heme/Onc Critical Care Critical Care

## (undated) DEVICE — NEEDLE 22 G X 1 1/2 SAFETY

## (undated) DEVICE — SUT MONOCRYL 3-0 SH 27 IN Y416H

## (undated) DEVICE — TUBING SUCTION 5MM X 12 FT

## (undated) DEVICE — GLOVE SRG BIOGEL 7.5

## (undated) DEVICE — SUT MONOCRYL 4-0 PS-2 18 IN Y496G

## (undated) DEVICE — TROCAR: Brand: KII FIOS FIRST ENTRY

## (undated) DEVICE — LIGHT HANDLE COVER SLEEVE DISP BLUE STELLAR

## (undated) DEVICE — ADHESIVE SKIN HIGH VISCOSITY EXOFIN 1ML

## (undated) DEVICE — TROCAR: Brand: KII® SLEEVE

## (undated) DEVICE — ELECTRODE BLADE MOD E-Z CLEAN  2.75IN 7CM -0012AM

## (undated) DEVICE — STRL COTTON TIP APPLCTR 6IN PK: Brand: CARDINAL HEALTH

## (undated) DEVICE — DRAPE SHEET THREE QUARTER

## (undated) DEVICE — VIAL DECANTER

## (undated) DEVICE — BETHLEHEM UNIVERSAL OUTPATIENT: Brand: CARDINAL HEALTH

## (undated) DEVICE — LIGACLIP 10-M/L, 10MM ENDOSCOPIC ROTATING MULTIPLE CLIP APPLIERS: Brand: LIGACLIP

## (undated) DEVICE — GLOVE SRG BIOGEL ORTHOPEDIC 8

## (undated) DEVICE — ELECTRODE LAP SPATULA CRV E-Z CLEAN 33CM -0018C

## (undated) DEVICE — PAD GROUNDING ADULT

## (undated) DEVICE — SUT MONOCRYL 5-0 P-3 18 IN Y493G

## (undated) DEVICE — 3000CC GUARDIAN II: Brand: GUARDIAN

## (undated) DEVICE — CHLORAPREP HI-LITE 26ML ORANGE

## (undated) DEVICE — JACKSON-PRATT 100CC BULB RESERVOIR: Brand: CARDINAL HEALTH

## (undated) DEVICE — INTENDED FOR TISSUE SEPARATION, AND OTHER PROCEDURES THAT REQUIRE A SHARP SURGICAL BLADE TO PUNCTURE OR CUT.: Brand: BARD-PARKER SAFETY BLADES SIZE 15, STERILE

## (undated) DEVICE — PACK PBDS LAP CHOLE RF

## (undated) DEVICE — POV-IOD SWAB STICKS

## (undated) DEVICE — LIGAMAX 5 MM ENDOSCOPIC MULTIPLE CLIP APPLIER: Brand: LIGAMAX

## (undated) DEVICE — SUT VICRYL 0 UR-6 27 IN J603H

## (undated) DEVICE — HARMONIC ACE 5MM DIAMETER SHEARS 36CM SHAFT LENGTH + ADAPTIVE TISSUE TECHNOLOGY FOR USE WITH GENERATOR G11: Brand: HARMONIC ACE

## (undated) DEVICE — JP CHANNEL DRAIN 19FR, FULL FLUTES: Brand: JACKSON-PRATT

## (undated) DEVICE — SUT SILK 4-0 30 IN A303H

## (undated) DEVICE — LIGACLIP MCA MULTIPLE CLIP APPLIERS, 20 SMALL CLIPS: Brand: LIGACLIP

## (undated) DEVICE — INTENDED FOR TISSUE SEPARATION, AND OTHER PROCEDURES THAT REQUIRE A SHARP SURGICAL BLADE TO PUNCTURE OR CUT.: Brand: BARD-PARKER SAFETY BLADES SIZE 11, STERILE